# Patient Record
Sex: FEMALE | Race: WHITE | NOT HISPANIC OR LATINO | Employment: OTHER | URBAN - METROPOLITAN AREA
[De-identification: names, ages, dates, MRNs, and addresses within clinical notes are randomized per-mention and may not be internally consistent; named-entity substitution may affect disease eponyms.]

---

## 2017-01-03 ENCOUNTER — ALLSCRIPTS OFFICE VISIT (OUTPATIENT)
Dept: OTHER | Facility: OTHER | Age: 67
End: 2017-01-03

## 2017-02-01 ENCOUNTER — ALLSCRIPTS OFFICE VISIT (OUTPATIENT)
Dept: OTHER | Facility: OTHER | Age: 67
End: 2017-02-01

## 2017-02-01 DIAGNOSIS — E78.00 PURE HYPERCHOLESTEROLEMIA: ICD-10-CM

## 2017-02-01 DIAGNOSIS — D69.6 THROMBOCYTOPENIA (HCC): ICD-10-CM

## 2017-02-01 DIAGNOSIS — I10 ESSENTIAL (PRIMARY) HYPERTENSION: ICD-10-CM

## 2017-03-17 DIAGNOSIS — Z12.31 ENCOUNTER FOR SCREENING MAMMOGRAM FOR MALIGNANT NEOPLASM OF BREAST: ICD-10-CM

## 2017-03-23 ENCOUNTER — HOSPITAL ENCOUNTER (OUTPATIENT)
Dept: RADIOLOGY | Age: 67
Discharge: HOME | End: 2017-03-23
Payer: MEDICARE

## 2017-03-23 DIAGNOSIS — Z12.31 ENCOUNTER FOR SCREENING MAMMOGRAM FOR MALIGNANT NEOPLASM OF BREAST: ICD-10-CM

## 2017-03-23 PROCEDURE — G0202 SCR MAMMO BI INCL CAD: HCPCS

## 2017-05-31 ENCOUNTER — TRANSCRIBE ORDERS (OUTPATIENT)
Dept: LAB | Age: 67
End: 2017-05-31

## 2017-05-31 ENCOUNTER — APPOINTMENT (OUTPATIENT)
Dept: LAB | Age: 67
End: 2017-05-31
Payer: MEDICARE

## 2017-05-31 DIAGNOSIS — I10 ESSENTIAL (PRIMARY) HYPERTENSION: ICD-10-CM

## 2017-05-31 DIAGNOSIS — E78.00 PURE HYPERCHOLESTEROLEMIA: ICD-10-CM

## 2017-05-31 DIAGNOSIS — D69.6 THROMBOCYTOPENIA (HCC): ICD-10-CM

## 2017-05-31 LAB
ALBUMIN SERPL BCP-MCNC: 3.1 G/DL (ref 3.5–5)
ALP SERPL-CCNC: 107 U/L (ref 46–116)
ALT SERPL W P-5'-P-CCNC: 20 U/L (ref 12–78)
ANION GAP SERPL CALCULATED.3IONS-SCNC: 6 MMOL/L (ref 4–13)
AST SERPL W P-5'-P-CCNC: 24 U/L (ref 5–45)
BACTERIA UR QL AUTO: NORMAL /HPF
BASOPHILS # BLD AUTO: 0.03 THOUSANDS/ΜL (ref 0–0.1)
BASOPHILS NFR BLD AUTO: 1 % (ref 0–1)
BILIRUB SERPL-MCNC: 0.72 MG/DL (ref 0.2–1)
BILIRUB UR QL STRIP: NEGATIVE
BUN SERPL-MCNC: 6 MG/DL (ref 5–25)
CALCIUM SERPL-MCNC: 8.5 MG/DL (ref 8.3–10.1)
CHLORIDE SERPL-SCNC: 104 MMOL/L (ref 100–108)
CHOLEST SERPL-MCNC: 125 MG/DL (ref 50–200)
CLARITY UR: CLEAR
CO2 SERPL-SCNC: 30 MMOL/L (ref 21–32)
COLOR UR: YELLOW
CREAT SERPL-MCNC: 0.6 MG/DL (ref 0.6–1.3)
EOSINOPHIL # BLD AUTO: 0.07 THOUSAND/ΜL (ref 0–0.61)
EOSINOPHIL NFR BLD AUTO: 1 % (ref 0–6)
ERYTHROCYTE [DISTWIDTH] IN BLOOD BY AUTOMATED COUNT: 13.4 % (ref 11.6–15.1)
GFR SERPL CREATININE-BSD FRML MDRD: >60 ML/MIN/1.73SQ M
GLUCOSE P FAST SERPL-MCNC: 108 MG/DL (ref 65–99)
GLUCOSE UR STRIP-MCNC: NEGATIVE MG/DL
HCT VFR BLD AUTO: 42.8 % (ref 34.8–46.1)
HDLC SERPL-MCNC: 54 MG/DL (ref 40–60)
HGB BLD-MCNC: 14.2 G/DL (ref 11.5–15.4)
HGB UR QL STRIP.AUTO: NEGATIVE
HYALINE CASTS #/AREA URNS LPF: NORMAL /LPF
KETONES UR STRIP-MCNC: NEGATIVE MG/DL
LDLC SERPL CALC-MCNC: 51 MG/DL (ref 0–100)
LEUKOCYTE ESTERASE UR QL STRIP: ABNORMAL
LYMPHOCYTES # BLD AUTO: 1.81 THOUSANDS/ΜL (ref 0.6–4.47)
LYMPHOCYTES NFR BLD AUTO: 31 % (ref 14–44)
MCH RBC QN AUTO: 29.2 PG (ref 26.8–34.3)
MCHC RBC AUTO-ENTMCNC: 33.2 G/DL (ref 31.4–37.4)
MCV RBC AUTO: 88 FL (ref 82–98)
MONOCYTES # BLD AUTO: 0.36 THOUSAND/ΜL (ref 0.17–1.22)
MONOCYTES NFR BLD AUTO: 6 % (ref 4–12)
NEUTROPHILS # BLD AUTO: 3.58 THOUSANDS/ΜL (ref 1.85–7.62)
NEUTS SEG NFR BLD AUTO: 61 % (ref 43–75)
NITRITE UR QL STRIP: NEGATIVE
NON-SQ EPI CELLS URNS QL MICRO: NORMAL /HPF
NRBC BLD AUTO-RTO: 0 /100 WBCS
PH UR STRIP.AUTO: 6.5 [PH] (ref 4.5–8)
PLATELET # BLD AUTO: 142 THOUSANDS/UL (ref 149–390)
PMV BLD AUTO: 11 FL (ref 8.9–12.7)
POTASSIUM SERPL-SCNC: 3.7 MMOL/L (ref 3.5–5.3)
PROT SERPL-MCNC: 7.1 G/DL (ref 6.4–8.2)
PROT UR STRIP-MCNC: NEGATIVE MG/DL
RBC # BLD AUTO: 4.87 MILLION/UL (ref 3.81–5.12)
RBC #/AREA URNS AUTO: NORMAL /HPF
SODIUM SERPL-SCNC: 140 MMOL/L (ref 136–145)
SP GR UR STRIP.AUTO: 1 (ref 1–1.03)
TRIGL SERPL-MCNC: 102 MG/DL
TSH SERPL DL<=0.05 MIU/L-ACNC: 2.87 UIU/ML (ref 0.36–3.74)
UROBILINOGEN UR QL STRIP.AUTO: 0.2 E.U./DL
WBC # BLD AUTO: 5.87 THOUSAND/UL (ref 4.31–10.16)
WBC #/AREA URNS AUTO: NORMAL /HPF

## 2017-05-31 PROCEDURE — 84443 ASSAY THYROID STIM HORMONE: CPT

## 2017-05-31 PROCEDURE — 80053 COMPREHEN METABOLIC PANEL: CPT

## 2017-05-31 PROCEDURE — 81001 URINALYSIS AUTO W/SCOPE: CPT

## 2017-05-31 PROCEDURE — 36415 COLL VENOUS BLD VENIPUNCTURE: CPT

## 2017-05-31 PROCEDURE — 80061 LIPID PANEL: CPT

## 2017-05-31 PROCEDURE — 85025 COMPLETE CBC W/AUTO DIFF WBC: CPT

## 2017-06-06 ENCOUNTER — ALLSCRIPTS OFFICE VISIT (OUTPATIENT)
Dept: OTHER | Facility: OTHER | Age: 67
End: 2017-06-06

## 2017-06-06 DIAGNOSIS — R73.01 IMPAIRED FASTING GLUCOSE: ICD-10-CM

## 2017-09-28 ENCOUNTER — GENERIC CONVERSION - ENCOUNTER (OUTPATIENT)
Dept: OTHER | Facility: OTHER | Age: 67
End: 2017-09-28

## 2017-11-01 ENCOUNTER — APPOINTMENT (OUTPATIENT)
Dept: LAB | Age: 67
End: 2017-11-01
Payer: MEDICARE

## 2017-11-01 ENCOUNTER — TRANSCRIBE ORDERS (OUTPATIENT)
Dept: LAB | Age: 67
End: 2017-11-01

## 2017-11-01 DIAGNOSIS — R73.01 IMPAIRED FASTING GLUCOSE: ICD-10-CM

## 2017-11-01 LAB
EST. AVERAGE GLUCOSE BLD GHB EST-MCNC: 117 MG/DL
GLUCOSE P FAST SERPL-MCNC: 99 MG/DL (ref 65–99)
HBA1C MFR BLD: 5.7 % (ref 4.2–6.3)

## 2017-11-01 PROCEDURE — 82947 ASSAY GLUCOSE BLOOD QUANT: CPT

## 2017-11-01 PROCEDURE — 36415 COLL VENOUS BLD VENIPUNCTURE: CPT

## 2017-11-01 PROCEDURE — 83036 HEMOGLOBIN GLYCOSYLATED A1C: CPT

## 2017-11-10 ENCOUNTER — ALLSCRIPTS OFFICE VISIT (OUTPATIENT)
Dept: OTHER | Facility: OTHER | Age: 67
End: 2017-11-10

## 2017-11-10 DIAGNOSIS — D69.6 THROMBOCYTOPENIA (HCC): ICD-10-CM

## 2017-11-10 DIAGNOSIS — E83.51 HYPOCALCEMIA: ICD-10-CM

## 2017-11-10 DIAGNOSIS — I10 ESSENTIAL (PRIMARY) HYPERTENSION: ICD-10-CM

## 2017-11-11 NOTE — PROGRESS NOTES
Assessment    1  Benign essential hypertension (401 1) (I10)   2  BMI 37 0-37 9, adult (V85 37) (Z68 37)   3  Depression (311) (F32 9)   4  Hypercholesterolemia (272 0) (E78 00)   5  Thrombocytopenia (287 5) (D69 6)    Plan  Benign essential hypertension    · Nadolol 40 MG Oral Tablet (Corgard); TAKE 1 TABLET ONCE DAILY   · (1) LIPID PANEL, FASTING; Status:Active; Requested for:10Nov2017;   Depression    · Escitalopram Oxalate 10 MG Oral Tablet (Lexapro); TAKE 1 TABLET DAILY  Hypercholesterolemia    · Ezetimibe 10 MG Oral Tablet (Zetia); TAKE 1 TABLET DAILY   · Simvastatin 40 MG Oral Tablet; TAKE 1 TABLET DAILY  Hypocalcemia    · (1) BASIC METABOLIC PROFILE; Status:Active; Requested for:10Nov2017; Insomnia    · Zolpidem Tartrate 10 MG Oral Tablet (Ambien); TAKE 1 TABLET AT BEDTIME AS NEEDEDFOR SLEEP  PMH: Anxiety    · FLUoxetine HCl - 20 MG Oral Capsule (PROzac); TAKE 1 CAPSULE DAILY   · LORazepam 1 MG Oral Tablet (Ativan); take 0 5 tablet daily  PMH: Colon cancer screening    · COLONOSCOPY; Status:Active; Requested for:10Nov2017;   PMH: Depression screening    · *VB-Depression Screening ; every 1 year; Last 95DLU2106; Next 21GKA6308; Status:Active  PMH: Generalized osteoarthritis of multiple sites    · TraMADol HCl - 50 MG Oral Tablet; TAKE 1 TABLET EVERY 4 TO 6 HOURS AS NEEDED  Thrombocytopenia    · (1) CBC/PLT/DIFF; Status:Active; Requested for:10Nov2017;     Discussion/Summary  Discussion Summary:   Patient is doing well all stable follow-up in about 4 months discussed the blood workup results for impair fasting blood sugar fasting blood sugar is good hemoglobin A1c is 5 7  Counseling Documentation With Imm: The patient was counseled regarding diagnostic results,-- prognosis,-- impressions  Chief Complaint  Chief Complaint Free Text Note Form: pt  presents for follow up for HTN  Review Labs 11/1/17  pt  would like Med refills  pt  has colonoscopy order at home        History of Present Illness  Hypertension (Follow-Up): The patient presents for follow-up of essential hypertension-- and-- Hypertension is reasonably controlled last time was high and we increased the dose of the medication I think it is helping will continue with the same management no changes  The patient states she has been doing poorly with her blood pressure control since the last visit  Symptoms: denies impaired vision,-- denies dyspnea,-- denies chest pain-- and-- denies lower extremity edema  Associated symptoms include no headache,-- no focal neurologic deficits-- and-- no memory loss  Medications: the patient is adherent with her medication regimen  -- She denies medication side effects  Additional History: recently had an eye exam sept2015  Hyperlipidemia (Follow-Up): The patient states her hyperlipidemia has been under good control since the last visit  She has no comorbid illnesses  She has no significant interval events  Symptoms: denies chest pain,-- denies muscle pain-- and-- denies muscle weakness  Associated symptoms include no focal neurologic deficits-- and-- no memory loss  Medications: the patient is adherent with her medication regimen  -- She denies medication side effects  Depression (Follow-Up): The patient states her depression has Depression and anxiety is better, but insomnia remain a problem for her, but improved since the last visit  She has no comorbid illnesses  She has had no significant interval events  Interval Symptoms: The patient is currently asymptomatic  stable depression,-- denies depressed mood-- and-- denies anxiety  Medications: The patient is not currently on any medications for her depression  Insomnia (Follow-Up): The patient is being seen for follow-up of insomnia and He again tried to wean her off or decrease mg of the Ambien but she was not able to get a sleep and she went back on her usual dose  The patient reports doing well   Comorbid Illnesses: anxiety-- and-- depression  The patient is currently asymptomatic  Associated symptoms: depression  Review of Systems  Complete-Female:  Constitutional: no fever,-- not feeling poorly,-- no recent weight gain,-- no chills,-- not feeling tired-- and-- no recent weight loss  Eyes: no eyesight problems  ENT: no earache,-- no sore throat-- and-- no hearing loss  Cardiovascular: no chest pain-- and-- no palpitations  Respiratory: no shortness of breath-- and-- no cough  Gastrointestinal: no abdominal pain,-- no nausea,-- no vomiting,-- no constipation-- and-- no diarrhea  Genitourinary: no dysuria-- and-- no incontinence  Musculoskeletal: arthralgias-- and-- Knee pain take tramadol for knee pain, but-- no myalgias  Integumentary: no rashes-- and-- no skin lesions  Neurological: tingling, but-- no headache,-- no numbness,-- no dizziness-- and-- no limb weakness  Psychiatric: as noted in HPI  Active Problems  1  Benign essential hypertension (401 1) (I10)   2  BMI 37 0-37 9, adult (V85 37) (Z68 37)   3  Depression (311) (F32 9)   4  History of candidal vulvovaginitis (V13 29) (Z86 19)   5  Hypercholesterolemia (272 0) (E78 00)   6  Hypocalcemia (275 41) (E83 51)   7  Impaired fasting glucose (790 21) (R73 01)   8  Insomnia (780 52) (G47 00)   9  Lumbar radiculopathy, chronic (724 4) (M54 16)   10  Thrombocytopenia (287 5) (D69 6)   11  Vaginal yeast infection (112 1) (B37 3)    Past Medical History  1  History of A Fall Due To Slipping, Tripping, Or Stumbling (E885 9)   2  History of Annual physical exam (V70 0) (Z00 00)   3  History of Anxiety (300 00) (F41 9)   4  Denied: History of Carrier Of STD   5  History of Dizziness (780 4) (R42)   6  History of Encounter for screening for osteoporosis (V82 81) (Z13 820)   7  History of Encounter for screening mammogram for malignant neoplasm of breast (V76 12) (Z12 31)   8  History of Endometriosis (617 9) (N80 9)   9  History of Foot Pain (Soft Tissue) (729 5)   10   History of Generalized osteoarthritis of multiple sites (715 09) (M15 9)   11  History of hyperlipidemia (V12 29) (Z86 39)   12  History of hypertension (V12 59) (Z86 79)   13  History of impacted cerumen (V12 49) (Z86 69)   14  History of Mental status change (780 97) (R41 82)   15  History of Pelvic Adhesions (568 0)   16  History of Screening for colon cancer (V76 51) (Z12 11)   17  History of Skin Cancer (V10 83)   18  History of Squamous Cell Carcinoma (199 1)   19  History of Stiff neck (723 5) (M43 6)   20  History of Visit for screening mammogram (V76 12) (Z12 31)    Surgical History  1  History of Appendectomy   2  History of Bx Breast Percutan Needle Core Use Imag Guide (Stereotactic)   3  History of Cholecystectomy   4  History of Incisional Breast Biopsy   5  History of Pilonidal Cyst Resection   6  History of Total Abdominal Hysterectomy With Removal Of Both Ovaries    Family History  Mother    1  Family history of Breast Cancer (V16 3)  Father    2  Family history of lung cancer (V16 1) (Z80 1)  Paternal Aunt    3  Family history of Breast Cancer (V16 3)  Family History    4  Family history of Family Health Status 2  Children Living    Social History     · Alcohol Use (History)   · Daily Coffee Consumption (2  Cups/Day)   · Exercise Habits   · Marital History - Currently    · Never A Smoker   · Never Used Drugs   · Occupation:   · Rarely consumes alcohol (V49 89) (Z78 9)    Current Meds   1  Escitalopram Oxalate 10 MG Oral Tablet; TAKE 1 TABLET DAILY; Therapy: 36Rem4726 to (Evaluate:12Skn5883)  Requested for: 49LWY0910; Last Rx:06Jun2017 Ordered   2  Ezetimibe 10 MG Oral Tablet; TAKE 1 TABLET DAILY; Last Rx:06Jun2017 Ordered   3  FLUoxetine HCl - 20 MG Oral Capsule; TAKE 1 CAPSULE DAILY; Last Rx:06Jun2017 Ordered   4  LORazepam 1 MG Oral Tablet; take 0 5 tablet daily; Last Rx:31Oct2017 Ordered   5  Nadolol 40 MG Oral Tablet; TAKE 1 TABLET ONCE DAILY; Last Rx:06Jun2017 Ordered   6   Oscal 500/200 D-3 500-200 MG-UNIT Oral Tablet; Take 2 daily; Therapy: 07OHT2471 to (Evaluate:03Nov2016); Last Rx:04Oct2016 Ordered   7  Simvastatin 40 MG Oral Tablet; TAKE 1 TABLET DAILY; Last Rx:06Jun2017 Ordered   8  TraMADol HCl - 50 MG Oral Tablet; TAKE 1 TABLET EVERY 4 TO 6 HOURS AS NEEDED; Therapy: 77UQM0964 to (Evaluate:69Cjj1826)  Requested for: 96GGG1229; Last Rx:06Jun2017 Ordered   9  Zolpidem Tartrate 10 MG Oral Tablet; TAKE 1 TABLET AT BEDTIME AS NEEDED FOR SLEEP; Last Rx:06Jun2017 Ordered  Medication List Reviewed: The medication list was reviewed and updated today  Allergies  1  Aspirin Buffered TABS   2  Codeine Derivatives   3  Pravachol TABS  4  No Known Environmental Allergies   5  No Known Food Allergies    Vitals  Vital Signs    Recorded: 86YLK0640 09:41AM   Temperature 97 6 F, Tympanic   Heart Rate 62   Systolic 497, LUE, Sitting   Diastolic 78, LUE, Sitting   Height 5 ft 1 in   Weight 200 lb    BMI Calculated 37 79   BSA Calculated 1 89   O2 Saturation 97, RA       Physical Exam   Constitutional  General appearance: No acute distress, well appearing and well nourished  Ears, Nose, Mouth, and Throat  External inspection of ears and nose: Normal    Oropharynx: Normal with no erythema, edema, exudate or lesions  Pulmonary  Respiratory effort: No increased work of breathing or signs of respiratory distress  Auscultation of lungs: Clear to auscultation  Cardiovascular  Auscultation of heart: Normal rate and rhythm, normal S1 and S2, without murmurs  Examination of extremities for edema and/or varicosities: Normal    Abdomen  Abdomen: Non-tender, no masses  Lymphatic  Palpation of lymph nodes in neck: No lymphadenopathy  Musculoskeletal  Inspection/palpation of joints, bones, and muscles: Abnormal  -- (straight leg test positive for pain on the right leg  THe pain is localized to the buttocks and to the back of the thigh   No pain on palpation  )        Results/Data  PHQ-9 Adult Depression Screening 69YYK1402 09:34AM User, s     Test Name Result Flag Reference   PHQ-9 Adult Depression Score 3       Over the last two weeks, how often have you been bothered by any of the following problems? Little interest or pleasure in doing things: Not at all - 0 Feeling down, depressed, or hopeless: Not at all - 0 Trouble falling or staying asleep, or sleeping too much: More than half the days - 2 Feeling tired or having little energy: Not at all - 0 Poor appetite or over eating: Several days - 1 Feeling bad about yourself - or that you are a failure or have let yourself or your family down: Not at all - 0 Trouble concentrating on things, such as reading the newspaper or watching television: Not at all - 0 Moving or speaking so slowly that other people could have noticed  Or the opposite -  being so fidgety or restless that you have been moving around a lot more than usual: Not at all - 0 Thoughts that you would be better off dead, or of hurting yourself in some way: Not at all - 0   PHQ-9 Adult Depression Screening Negative     PHQ-9 Difficulty Level Not difficult at all     PHQ-9 Severity Minimal Depression           Health Management  Depression screening   *VB-Depression Screening; every 1 year; Last 08NWW3412; Next Due: 98BLS0483; Active  History of Encounter for screening for other nervous system disorder   *VB - Fall Risk Assessment  (Dx Z13 89 Screen for Neurologic Disorder); every 1 year; [id number]; Next Due: 80CLU2004; Active  History of Encounter for screening mammogram for malignant neoplasm of breast   DIGTL SCRN MAMMO W/CAD; every 1 year; Last 04OKU3932; Next Due: 82VHN6449; Overdue  History of Screening for other and unspecified genitourinary condition   *VB - Urinary Incontinence Screen (Dx Z13 89 Screen for UI); every 1 year; Last 31EMJ1058; NextDue: 35IDC4748; Active  History of Visit for screening mammogram   Digital Bilateral Screening Mammogram With CAD; every 1 year;  Last 24WDA0838; Next Due:17Mar2017;  Overdue    Future Appointments    Date/Time Provider Specialty Site   01/16/2018 02:40 PM Samantha Fraser MD Obstetrics/Gynecology [facility]       Signatures   Electronically signed by : Rosio Olvera MD; Nov 10 2017 10:08AM EST                       (Author)

## 2018-01-12 NOTE — PROGRESS NOTES
Assessment    1  Benign essential hypertension (401 1) (I10)   2  Hypercholesterolemia (272 0) (E78 0)   3  Insomnia (780 52) (G47 00)   4  Lumbar radiculopathy, chronic (724 4) (M54 16)   5  Stiff neck (723 5) (M43 6)    Plan  Depression    · Escitalopram Oxalate 10 MG Oral Tablet (Lexapro); TAKE 1 TABLET DAILY  Insomnia    · Zolpidem Tartrate 10 MG Oral Tablet (Ambien); TAKE 1 TABLET AT BEDTIME AS NEEDED  FOR SLEEP  Lumbar radiculopathy, chronic, Stiff neck    · *1 - [facility] Physical Therapy  Consult  Status: Hold For - Scheduling  Requested for:  26Jan2016  Care Summary provided  : Yes  PMH: Generalized osteoarthritis of multiple sites    · TraMADol HCl - 50 MG Oral Tablet; TAKE 1 TABLET EVERY 4 TO 6 HOURS AS NEEDED    Chief Complaint  Chief Complaint Free Text Note Form: PT  presents for 6 week follow up for HTN, hyperlipidemia  Please discuss Ambien      History of Present Illness  HPI: Patient states that she is feeling much better on the new medication, escitalopram  She reports no side effects  She is feeling much better after taking the half dose of Prozac and states that she no longer has GI discomfort  The patient states that the Ambien is helping her fall asleep and stay asleep  The patient has complaints of a stiff neck and pain when she nods her head up and down  She also has pain radiates down her right leg with certain movements  At times she has numbness and tingling in her right leg that lasts for a couple minutes  The pain started last July and has not gone away  She has not taken any medications for the pain  Hyperlipidemia (Follow-Up): Symptoms: denies chest pain, denies muscle pain and denies muscle weakness  Associated symptoms include no focal neurologic deficits and no memory loss  Medications: the patient is adherent with her medication regimen  She denies medication side effects  Hypertension (Follow-Up):    Symptoms: denies impaired vision, denies dyspnea, denies chest pain and denies lower extremity edema  Associated symptoms include no headache, no focal neurologic deficits and no memory loss  Medications: the patient is adherent with her medication regimen  She denies medication side effects  Additional History: recently had an eye exam sept2015  Insomnia (Follow-Up): The patient is being seen for follow-up of insomnia  The patient reports doing well and Patient doing well with 10mg and sleeping well started after the death of her young son , no side effects of medication and can not sleep without it  Comorbid Illnesses: anxiety and depression  Interval symptoms:  denies difficulty falling asleep, denies difficulty staying asleep, denies unrefreshing sleep, denies daytime sleepiness and denies fatigue  Back Pain (Follow-Up): The patient is being seen for follow-up of back pain  Interval symptoms:  worsened back stiffness and worsened lower extremity pain    The patient presents with complaints of gradual onset of intermittent episodes of worsened buttock pain  Symptoms are worsening  Review of Systems  Complete-Female:   Constitutional: no fever, not feeling poorly, no recent weight gain, no chills, not feeling tired and no recent weight loss  Eyes: no eyesight problems  ENT: no earache, no sore throat and no hearing loss  Cardiovascular: no chest pain and no palpitations  Respiratory: no shortness of breath and no cough  Gastrointestinal: no abdominal pain, no nausea, no vomiting, no constipation and no diarrhea  Genitourinary: no dysuria and no incontinence  Musculoskeletal: no myalgias  Integumentary: no rashes and no skin lesions  Neurological: tingling, but no headache, no numbness, no dizziness and no limb weakness  Psychiatric: as noted in HPI  Active Problems    1  Benign essential hypertension (401 1) (I10)   2  BMI 37 0-37 9, adult (V85 37) (Z68 37)   3  Breast cancer screening, high risk patient (V76 11) (Z12 31)   4  Depression (311) (F32 9)   5  Encounter for routine gynecological examination (V72 31) (Z01 419)   6  Encounter for screening for other nervous system disorder (V80 09) (Z13 858)   7  Hypercholesterolemia (272 0) (E78 0)   8  Insomnia (780 52) (G47 00)    Past Medical History    1  History of A Fall Due To Slipping, Tripping, Or Stumbling (E885 9)   2  History of Annual physical exam (V70 0) (Z00 00)   3  History of Anxiety (300 00) (F41 9)   4  Denied: History of Carrier Of STD   5  History of Colon cancer screening (V76 51) (Z12 11)   6  History of Dizziness (780 4) (R42)   7  History of Encounter for screening for osteoporosis (V82 81) (Z13 820)   8  History of Encounter for screening mammogram for malignant neoplasm of breast (V76 12) (Z12 31)   9  History of Endometriosis (617 9) (N80 9)   10  History of Foot Pain (Soft Tissue) (729 5)   11  History of Generalized osteoarthritis of multiple sites (715 09) (M15 9)   12  History of hyperlipidemia (V12 29) (Z86 39)   13  History of hypertension (V12 59) (Z86 79)   14  History of impacted cerumen (V12 49) (Z86 69)   15  History of Mental status change (780 97) (R41 82)   16  History of Need for prophylactic vaccination and inoculation against influenza (V04 81) (Z23)   17  History of Pelvic Adhesions (568 0)   18  History of Screening for colon cancer (V76 51) (Z12 11)   19  History of Skin Cancer (V10 83)   20  History of Squamous Cell Carcinoma (199 1)   21  History of Visit for screening mammogram (V76 12) (Z12 31)    Surgical History    1  History of Appendectomy   2  History of Bx Breast Percutan Needle Core Use Imag Guide (Stereotactic)   3  History of Cholecystectomy   4  History of Incisional Breast Biopsy   5  History of Pilonidal Cyst Resection   6  History of Total Abdominal Hysterectomy With Removal Of Both Ovaries    Family History    1  Family history of Breast Cancer (V16 3)    2  Family history of Breast Cancer (V16 3)    3   Family history of Family Health Status 2  Children Living    Social History    · Alcohol Use (History)   · Daily Coffee Consumption (2  Cups/Day)   · Exercise Habits   · Marital History - Currently    · Never A Smoker   · Never Used Drugs   · Occupation:    Current Meds   1  Escitalopram Oxalate 10 MG Oral Tablet; TAKE 1 TABLET DAILY; Therapy: 13Yaz7074 to (Campbell Richter)  Requested for: 47Mbl5949; Last Rx:14Nuf1517   Ordered   2  FLUoxetine HCl - 20 MG Oral Capsule; TAKE 1 CAPSULE DAILY; Last Rx:24Aug2015 Ordered   3  LORazepam 0 5 MG Oral Tablet; TAKE 1 TABLET DAILY AS NEEDED; Last Rx:24Aug2015 Ordered   4  Nadolol 20 MG Oral Tablet; TAKE 1 TABLET DAILY; Last Rx:24Aug2015 Ordered   5  Simvastatin 40 MG Oral Tablet; TAKE 1 TABLET DAILY; Last Rx:24Aug2015 Ordered   6  TraMADol HCl - 50 MG Oral Tablet; TAKE 1 TABLET EVERY 4 TO 6 HOURS AS NEEDED; Therapy: 22ZFR0063 to (Evaluate:87Lpt1086)  Requested for: 24Aug2015; Last Rx:24Aug2015   Ordered   7  Zetia 10 MG Oral Tablet; TAKE 1 TABLET DAILY; Last Rx:24Aug2015 Ordered   8  Zolpidem Tartrate 10 MG Oral Tablet; TAKE 1 TABLET AT BEDTIME AS NEEDED FOR SLEEP; Last   Rx:24Aug2015 Ordered    Allergies    1  Aspirin Buffered TABS   2  Codeine Derivatives   3  Codeine Sulfate TABS   4  Pravachol TABS    Vitals  Vital Signs [Data Includes: Current Encounter]    Recorded: 91GEH7194 05:20PM   Temperature 97 9 F, Tympanic   Heart Rate 71   Systolic 365, LUE, Sitting   Diastolic 82, LUE, Sitting   Height 5 ft 1 in   Weight 201 lb 6 oz   BMI Calculated 38 05   BSA Calculated 1 89   O2 Saturation 95     Physical Exam    Constitutional   General appearance: No acute distress, well appearing and well nourished  Ears, Nose, Mouth, and Throat   External inspection of ears and nose: Normal     Oropharynx: Normal with no erythema, edema, exudate or lesions  Pulmonary   Respiratory effort: No increased work of breathing or signs of respiratory distress      Auscultation of lungs: Clear to auscultation  Cardiovascular   Auscultation of heart: Normal rate and rhythm, normal S1 and S2, without murmurs  Examination of extremities for edema and/or varicosities: Normal     Abdomen   Abdomen: Non-tender, no masses  Lymphatic   Palpation of lymph nodes in neck: No lymphadenopathy  Musculoskeletal   Inspection/palpation of joints, bones, and muscles: Abnormal   (straight leg test positive for pain on the right leg  THe pain is localized to the buttocks and to the back of the thigh  No pain on palpation  )        Health Management  Encounter for screening for other nervous system disorder   *VB - Fall Risk Assessment  (Dx V80 09 Screen for Neurologic Disorder); every 1 year; Last  37YSV3407; Next Due: 55KYQ6727; Active  History of Encounter for screening mammogram for malignant neoplasm of breast   DIGTL SCRN MAMMO W/CAD; every 1 year; Last 56GNO6902; Next Due: 30WKD0286; Near Due  History of Visit for screening mammogram   Digital Bilateral Screening Mammogram With CAD; every 1 year; Last 85RNP5455; Next Due:  63FZN3438;  Overdue    Future Appointments    Date/Time Provider Specialty Site   01/03/2017 03:00 PM Ryan Naylor MD Obstetrics/Gynecology [facility] & [address], [address]     Signatures   Electronically signed by : Kamala Betancourt MD; Jan 26 2016  6:03PM EST                       (Author)

## 2018-01-13 VITALS
BODY MASS INDEX: 37.76 KG/M2 | DIASTOLIC BLOOD PRESSURE: 92 MMHG | WEIGHT: 200 LBS | SYSTOLIC BLOOD PRESSURE: 150 MMHG | HEIGHT: 61 IN

## 2018-01-13 VITALS
SYSTOLIC BLOOD PRESSURE: 138 MMHG | OXYGEN SATURATION: 97 % | BODY MASS INDEX: 37.76 KG/M2 | WEIGHT: 200 LBS | HEIGHT: 61 IN | DIASTOLIC BLOOD PRESSURE: 78 MMHG | TEMPERATURE: 97.6 F | HEART RATE: 62 BPM

## 2018-01-14 VITALS
SYSTOLIC BLOOD PRESSURE: 142 MMHG | OXYGEN SATURATION: 96 % | TEMPERATURE: 97.5 F | DIASTOLIC BLOOD PRESSURE: 74 MMHG | HEART RATE: 62 BPM | BODY MASS INDEX: 37.59 KG/M2 | HEIGHT: 61 IN | WEIGHT: 199.13 LBS

## 2018-01-15 VITALS
BODY MASS INDEX: 37.57 KG/M2 | HEIGHT: 61 IN | OXYGEN SATURATION: 96 % | WEIGHT: 199 LBS | TEMPERATURE: 97 F | SYSTOLIC BLOOD PRESSURE: 148 MMHG | HEART RATE: 84 BPM | DIASTOLIC BLOOD PRESSURE: 86 MMHG

## 2018-01-16 ENCOUNTER — GENERIC CONVERSION - ENCOUNTER (OUTPATIENT)
Dept: OTHER | Facility: OTHER | Age: 68
End: 2018-01-16

## 2018-01-18 NOTE — MISCELLANEOUS
Message   Recorded as Task   Date: 10/31/2016 09:08 AM, Created By: Coy Curling   Task Name: Follow Up   Assigned To: Anastasiia Aviles   Regarding Patient: Lawrence April, Status: In Progress   CommentEllard Dinning - 31 Oct 2016 9:08 AM     TASK CREATED  Caller: Self; [phone] (Home); [phone] (Work)  pt has a bad yeast inf call her at [address] Oct 2016 9:54 AM     TASK IN [address] Alice Garcia,[address] - 31 Oct 2016 9:58 AM     TASK EDITED   Kandis Avila - 31 Oct 2016 10:14 AM     TASK EDITED                 has itching, little discharge, denies odor  states it started on thursday-diflucan sent to [facility] awaiting sign off from   Active Problems    1  Benign essential hypertension (401 1) (I10)   2  BMI 37 0-37 9, adult (V85 37) (Z68 37)   3  Breast cancer screening, high risk patient (V76 11) (Z12 39)   4  Colon cancer screening (V76 51) (Z12 11)   5  Depression (311) (F32 9)   6  Encounter for routine gynecological examination (V72 31) (Z01 419)   7  Encounter for screening for other nervous system disorder (V80 09) (Z13 858)   8  Hypercholesterolemia (272 0) (E78 00)   9  Hypocalcemia (275 41) (E83 51)   10  Insomnia (780 52) (G47 00)   11  Lumbar radiculopathy, chronic (724 4) (M54 16)   12  Need for prophylactic vaccination and inoculation against influenza (V04 81) (Z23)   13  Screening for genitourinary condition (V81 6) (Z13 89)   14  Screening for neurological condition (V80 09) (Z13 89)   15  Stiff neck (723 5) (M43 6)   16  Thrombocytopenia (287 5) (D69 6)   17  Vaginal yeast infection (112 1) (B37 3)    Current Meds   1  Escitalopram Oxalate 10 MG Oral Tablet (Lexapro); TAKE 1 TABLET DAILY; Therapy: [id number] to (Evaluate:02Apr2017)  Requested for: [id number]; Last   Rx:04Oct2016 Ordered   2  FLUoxetine HCl - 20 MG Oral Capsule (PROzac); TAKE 1 CAPSULE DAILY; Last   Rx:04Oct2016 Ordered   3   LORazepam 0 5 MG Oral Tablet; TAKE 1 TABLET DAILY AS NEEDED; Last   Rx:04Oct2016 Ordered   4  Nadolol 20 MG Oral Tablet; TAKE 1 TABLET DAILY; Last Rx:04Oct2016 Ordered   5  Oscal 500/200 D-3 500-200 MG-UNIT Oral Tablet; Take 2 daily; Therapy: [id number] to (Evaluate:03Nov2016); Last Rx:[id number] Ordered   6  Simvastatin 40 MG Oral Tablet; TAKE 1 TABLET DAILY; Last Rx:04Oct2016 Ordered   7  TraMADol HCl - 50 MG Oral Tablet; TAKE 1 TABLET EVERY 4 TO 6 HOURS AS   NEEDED; Therapy: [id number] to (Evaluate:02Apr2017)  Requested for: 04Oct2016; Last   Rx:[id number] Ordered   8  Zetia 10 MG Oral Tablet; TAKE 1 TABLET DAILY; Last Rx:04Oct2016 Ordered   9  Zolpidem Tartrate 10 MG Oral Tablet (Ambien); TAKE 1 TABLET AT BEDTIME AS   NEEDED FOR SLEEP; Last Rx:04Oct2016 Ordered    Allergies    1  Aspirin Buffered TABS   2  Codeine Derivatives   3   Pravachol TABS    Plan  Vaginal yeast infection    · Fluconazole 150 MG Oral Tablet (Diflucan); TAKE 1 TABLET NOW AND AGAIN IN  3 DAYS    Signatures   Electronically signed by : Anupama Cedeno LPN; Oct 31 5839 [id number] EST                       (Author)

## 2018-01-24 VITALS
BODY MASS INDEX: 37.57 KG/M2 | HEIGHT: 61 IN | DIASTOLIC BLOOD PRESSURE: 70 MMHG | SYSTOLIC BLOOD PRESSURE: 134 MMHG | WEIGHT: 199 LBS

## 2018-03-23 DIAGNOSIS — Z12.31 ENCOUNTER FOR SCREENING MAMMOGRAM FOR MALIGNANT NEOPLASM OF BREAST: ICD-10-CM

## 2018-03-29 ENCOUNTER — HOSPITAL ENCOUNTER (OUTPATIENT)
Dept: RADIOLOGY | Age: 68
Discharge: HOME | End: 2018-03-29
Payer: MEDICARE

## 2018-03-29 DIAGNOSIS — Z12.31 ENCOUNTER FOR SCREENING MAMMOGRAM FOR MALIGNANT NEOPLASM OF BREAST: ICD-10-CM

## 2018-03-29 PROCEDURE — 77067 SCR MAMMO BI INCL CAD: CPT

## 2018-04-04 ENCOUNTER — APPOINTMENT (OUTPATIENT)
Dept: LAB | Age: 68
End: 2018-04-04
Payer: MEDICARE

## 2018-04-04 DIAGNOSIS — D69.6 THROMBOCYTOPENIA (HCC): ICD-10-CM

## 2018-04-04 DIAGNOSIS — I10 ESSENTIAL (PRIMARY) HYPERTENSION: ICD-10-CM

## 2018-04-04 DIAGNOSIS — E83.51 HYPOCALCEMIA: ICD-10-CM

## 2018-04-04 LAB
ANION GAP SERPL CALCULATED.3IONS-SCNC: 7 MMOL/L (ref 4–13)
BASOPHILS # BLD AUTO: 0.02 THOUSANDS/ΜL (ref 0–0.1)
BASOPHILS NFR BLD AUTO: 0 % (ref 0–1)
BUN SERPL-MCNC: 5 MG/DL (ref 5–25)
CALCIUM SERPL-MCNC: 8.2 MG/DL (ref 8.3–10.1)
CHLORIDE SERPL-SCNC: 106 MMOL/L (ref 100–108)
CHOLEST SERPL-MCNC: 124 MG/DL (ref 50–200)
CO2 SERPL-SCNC: 28 MMOL/L (ref 21–32)
CREAT SERPL-MCNC: 0.66 MG/DL (ref 0.6–1.3)
EOSINOPHIL # BLD AUTO: 0.09 THOUSAND/ΜL (ref 0–0.61)
EOSINOPHIL NFR BLD AUTO: 2 % (ref 0–6)
ERYTHROCYTE [DISTWIDTH] IN BLOOD BY AUTOMATED COUNT: 13.5 % (ref 11.6–15.1)
GFR SERPL CREATININE-BSD FRML MDRD: 90 ML/MIN/1.73SQ M
GLUCOSE P FAST SERPL-MCNC: 115 MG/DL (ref 65–99)
HCT VFR BLD AUTO: 41 % (ref 34.8–46.1)
HDLC SERPL-MCNC: 58 MG/DL (ref 40–60)
HGB BLD-MCNC: 13.3 G/DL (ref 11.5–15.4)
LDLC SERPL CALC-MCNC: 46 MG/DL (ref 0–100)
LYMPHOCYTES # BLD AUTO: 1.81 THOUSANDS/ΜL (ref 0.6–4.47)
LYMPHOCYTES NFR BLD AUTO: 33 % (ref 14–44)
MCH RBC QN AUTO: 29.2 PG (ref 26.8–34.3)
MCHC RBC AUTO-ENTMCNC: 32.4 G/DL (ref 31.4–37.4)
MCV RBC AUTO: 90 FL (ref 82–98)
MONOCYTES # BLD AUTO: 0.35 THOUSAND/ΜL (ref 0.17–1.22)
MONOCYTES NFR BLD AUTO: 6 % (ref 4–12)
NEUTROPHILS # BLD AUTO: 3.18 THOUSANDS/ΜL (ref 1.85–7.62)
NEUTS SEG NFR BLD AUTO: 59 % (ref 43–75)
NRBC BLD AUTO-RTO: 0 /100 WBCS
PLATELET # BLD AUTO: 119 THOUSANDS/UL (ref 149–390)
PMV BLD AUTO: 10.8 FL (ref 8.9–12.7)
POTASSIUM SERPL-SCNC: 3.5 MMOL/L (ref 3.5–5.3)
RBC # BLD AUTO: 4.55 MILLION/UL (ref 3.81–5.12)
SODIUM SERPL-SCNC: 141 MMOL/L (ref 136–145)
TRIGL SERPL-MCNC: 101 MG/DL
WBC # BLD AUTO: 5.46 THOUSAND/UL (ref 4.31–10.16)

## 2018-04-04 PROCEDURE — 36415 COLL VENOUS BLD VENIPUNCTURE: CPT

## 2018-04-04 PROCEDURE — 80048 BASIC METABOLIC PNL TOTAL CA: CPT

## 2018-04-04 PROCEDURE — 85025 COMPLETE CBC W/AUTO DIFF WBC: CPT

## 2018-04-04 PROCEDURE — 80061 LIPID PANEL: CPT

## 2018-04-09 ENCOUNTER — OFFICE VISIT (OUTPATIENT)
Dept: INTERNAL MEDICINE CLINIC | Age: 68
End: 2018-04-09
Payer: MEDICARE

## 2018-04-09 VITALS
HEIGHT: 61 IN | OXYGEN SATURATION: 97 % | BODY MASS INDEX: 37.72 KG/M2 | HEART RATE: 58 BPM | DIASTOLIC BLOOD PRESSURE: 68 MMHG | TEMPERATURE: 98.3 F | SYSTOLIC BLOOD PRESSURE: 122 MMHG | WEIGHT: 199.8 LBS

## 2018-04-09 DIAGNOSIS — D69.6 THROMBOCYTOPENIA (HCC): ICD-10-CM

## 2018-04-09 DIAGNOSIS — I10 HYPERTENSION, UNSPECIFIED TYPE: ICD-10-CM

## 2018-04-09 DIAGNOSIS — E78.00 HYPERCHOLESTEREMIA: ICD-10-CM

## 2018-04-09 DIAGNOSIS — I10 BENIGN ESSENTIAL HYPERTENSION: ICD-10-CM

## 2018-04-09 DIAGNOSIS — R73.01 IMPAIRED FASTING GLUCOSE: ICD-10-CM

## 2018-04-09 DIAGNOSIS — E78.00 HYPERCHOLESTEROLEMIA: ICD-10-CM

## 2018-04-09 DIAGNOSIS — E83.51 HYPOCALCEMIA: ICD-10-CM

## 2018-04-09 DIAGNOSIS — F41.9 ANXIETY: Primary | ICD-10-CM

## 2018-04-09 DIAGNOSIS — M25.562 PAIN IN BOTH KNEES, UNSPECIFIED CHRONICITY: ICD-10-CM

## 2018-04-09 DIAGNOSIS — M25.561 PAIN IN BOTH KNEES, UNSPECIFIED CHRONICITY: ICD-10-CM

## 2018-04-09 DIAGNOSIS — G47.00 INSOMNIA, UNSPECIFIED TYPE: ICD-10-CM

## 2018-04-09 DIAGNOSIS — F32.A DEPRESSION, UNSPECIFIED DEPRESSION TYPE: ICD-10-CM

## 2018-04-09 DIAGNOSIS — M17.12 PRIMARY OSTEOARTHRITIS OF LEFT KNEE: ICD-10-CM

## 2018-04-09 DIAGNOSIS — Z12.11 SCREENING FOR COLON CANCER: ICD-10-CM

## 2018-04-09 PROBLEM — N95.2 VAGINAL ATROPHY: Status: ACTIVE | Noted: 2018-01-16

## 2018-04-09 PROCEDURE — 99214 OFFICE O/P EST MOD 30 MIN: CPT | Performed by: INTERNAL MEDICINE

## 2018-04-09 RX ORDER — FLUOXETINE HYDROCHLORIDE 20 MG/1
1 CAPSULE ORAL DAILY
COMMUNITY
End: 2018-04-09 | Stop reason: ALTCHOICE

## 2018-04-09 RX ORDER — TRAMADOL HYDROCHLORIDE 50 MG/1
50 TABLET ORAL 2 TIMES DAILY
Qty: 60 TABLET | Refills: 0 | Status: SHIPPED | OUTPATIENT
Start: 2018-04-09 | End: 2018-04-09 | Stop reason: SDUPTHER

## 2018-04-09 RX ORDER — EZETIMIBE 10 MG/1
10 TABLET ORAL DAILY
Qty: 90 TABLET | Refills: 1 | Status: SHIPPED | OUTPATIENT
Start: 2018-04-09 | End: 2018-10-10 | Stop reason: SDUPTHER

## 2018-04-09 RX ORDER — TRAMADOL HYDROCHLORIDE 50 MG/1
50 TABLET ORAL 2 TIMES DAILY PRN
Qty: 60 TABLET | Refills: 5 | Status: SHIPPED | OUTPATIENT
Start: 2018-04-09 | End: 2018-10-10 | Stop reason: SDUPTHER

## 2018-04-09 RX ORDER — ZOLPIDEM TARTRATE 10 MG/1
1 TABLET ORAL DAILY PRN
COMMUNITY
End: 2018-04-09 | Stop reason: SDUPTHER

## 2018-04-09 RX ORDER — NADOLOL 40 MG/1
1 TABLET ORAL DAILY
COMMUNITY
End: 2018-04-09 | Stop reason: SDUPTHER

## 2018-04-09 RX ORDER — SIMVASTATIN 40 MG
40 TABLET ORAL DAILY
Qty: 90 TABLET | Refills: 1 | Status: SHIPPED | OUTPATIENT
Start: 2018-04-09 | End: 2018-10-10 | Stop reason: SDUPTHER

## 2018-04-09 RX ORDER — FLUOXETINE HYDROCHLORIDE 20 MG/1
20 CAPSULE ORAL DAILY
Qty: 90 CAPSULE | Refills: 0 | Status: CANCELLED | OUTPATIENT
Start: 2018-04-09

## 2018-04-09 RX ORDER — LORAZEPAM 1 MG/1
0.5 TABLET ORAL DAILY
COMMUNITY
End: 2018-04-09 | Stop reason: SDUPTHER

## 2018-04-09 RX ORDER — ZOLPIDEM TARTRATE 10 MG/1
10 TABLET ORAL
Qty: 30 TABLET | Refills: 5 | Status: SHIPPED | OUTPATIENT
Start: 2018-04-09 | End: 2018-10-10 | Stop reason: SDUPTHER

## 2018-04-09 RX ORDER — ZOLPIDEM TARTRATE 10 MG/1
10 TABLET ORAL
Qty: 30 TABLET | Refills: 0 | Status: SHIPPED | OUTPATIENT
Start: 2018-04-09 | End: 2018-04-09 | Stop reason: SDUPTHER

## 2018-04-09 RX ORDER — LORAZEPAM 1 MG/1
0.5 TABLET ORAL DAILY PRN
Qty: 30 TABLET | Refills: 0 | Status: SHIPPED | OUTPATIENT
Start: 2018-04-09 | End: 2018-10-10 | Stop reason: SDUPTHER

## 2018-04-09 RX ORDER — EZETIMIBE 10 MG/1
1 TABLET ORAL DAILY
COMMUNITY
End: 2018-04-09 | Stop reason: SDUPTHER

## 2018-04-09 RX ORDER — TRAMADOL HYDROCHLORIDE 50 MG/1
1 TABLET ORAL
COMMUNITY
Start: 2014-02-18 | End: 2018-04-09 | Stop reason: SDUPTHER

## 2018-04-09 RX ORDER — ESCITALOPRAM OXALATE 10 MG/1
1 TABLET ORAL DAILY
COMMUNITY
Start: 2015-08-24 | End: 2018-04-09 | Stop reason: SDUPTHER

## 2018-04-09 RX ORDER — NADOLOL 40 MG/1
40 TABLET ORAL DAILY
Qty: 90 TABLET | Refills: 1 | Status: SHIPPED | OUTPATIENT
Start: 2018-04-09 | End: 2018-10-10 | Stop reason: SDUPTHER

## 2018-04-09 RX ORDER — ESCITALOPRAM OXALATE 10 MG/1
10 TABLET ORAL DAILY
Qty: 90 TABLET | Refills: 1 | Status: SHIPPED | OUTPATIENT
Start: 2018-04-09 | End: 2018-10-10 | Stop reason: SDUPTHER

## 2018-04-09 RX ORDER — SIMVASTATIN 40 MG
1 TABLET ORAL DAILY
COMMUNITY
End: 2018-04-09 | Stop reason: SDUPTHER

## 2018-04-09 NOTE — ASSESSMENT & PLAN NOTE
Discontinue fluoxetine, wean off with taking 20 mg every other day for 2-weeks and then stop  Continue with Lexapro 10 mg daily  Patient advised to contact office for worsening anxiety/ depression symptoms  Ativan 0 5 mg to be taken daily as needed refilled today

## 2018-04-09 NOTE — ASSESSMENT & PLAN NOTE
Stable  No need for a referral to orthopedics at this time  Encourage continued exercised  Tramadol refilled today

## 2018-04-09 NOTE — PROGRESS NOTES
Assessment/Plan:    Impaired fasting glucose    Will check a hemoglobin A1c prior to next appointment  Encourage lifestyle modifications  Benign essential hypertension    Well controlled  Continue with current regimen  Primary osteoarthritis of left knee    Stable  No need for a referral to orthopedics at this time  Encourage continued exercised  Tramadol refilled today  Depression  Discontinue fluoxetine, wean off with taking 20 mg every other day for 2-weeks and then stop  Continue with Lexapro 10 mg daily  Patient advised to contact office for worsening anxiety/ depression symptoms  Ativan 0 5 mg to be taken daily as needed refilled today  Hypercholesterolemia    Stable, well controlled  Continue with ezetimibe and simvastatin  Insomnia    Ambien refill today, 10 mg daily as needed for insomnia  Hypocalcemia  Recommend she supplement 1200 mg calcium and 1000 IU vitamin D  Daily  Thrombocytopenia (HCC)    Stable  Continue to monitor  Diagnoses and all orders for this visit:    Anxiety  -     escitalopram (LEXAPRO) 10 mg tablet; Take 1 tablet (10 mg total) by mouth daily  -     LORazepam (ATIVAN) 1 mg tablet; Take 0 5 tablets (0 5 mg total) by mouth daily as needed for anxiety    Screening for colon cancer  -     Ambulatory referral to Gastroenterology; Future    Hypercholesteremia  -     ezetimibe (ZETIA) 10 mg tablet; Take 1 tablet (10 mg total) by mouth daily  -     simvastatin (ZOCOR) 40 mg tablet; Take 1 tablet (40 mg total) by mouth daily    Hypertension, unspecified type  -     nadolol (CORGARD) 40 mg tablet; Take 1 tablet (40 mg total) by mouth daily    Pain in both knees, unspecified chronicity  -     traMADol (ULTRAM) 50 mg tablet; Take 1 tablet (50 mg total) by mouth 2 (two) times a day    Insomnia, unspecified type  -     zolpidem (AMBIEN) 10 mg tablet;  Take 1 tablet (10 mg total) by mouth daily at bedtime as needed (prn)    Impaired fasting glucose  - HEMOGLOBIN A1C W/ EAG ESTIMATION; Future    Benign essential hypertension    Primary osteoarthritis of left knee    Depression, unspecified depression type    Hypocalcemia    Thrombocytopenia (HCC)    Hypercholesterolemia    Other orders  -     Discontinue: escitalopram (LEXAPRO) 10 mg tablet; Take 1 tablet by mouth daily  -     Discontinue: ezetimibe (ZETIA) 10 mg tablet; Take 1 tablet by mouth daily  -     FLUoxetine (PROzac) 20 mg capsule; Take 1 capsule by mouth daily  -     Discontinue: LORazepam (ATIVAN) 1 mg tablet; Take 0 5 tablets by mouth daily  -     Discontinue: nadolol (CORGARD) 40 mg tablet; Take 1 tablet by mouth daily  -     Discontinue: simvastatin (ZOCOR) 40 mg tablet; Take 1 tablet by mouth daily  -     Discontinue: traMADol (ULTRAM) 50 mg tablet; Take 1 tablet by mouth  -     Discontinue: zolpidem (AMBIEN) 10 mg tablet; Take 1 tablet by mouth daily as needed  -     Cancel: FLUoxetine (PROzac) 20 mg capsule; Take 1 capsule (20 mg total) by mouth daily          Subjective:      Patient ID: Lee Posada is a 70 y o  female  79-year-old female is seen today for follow-up of chronic conditions  Laboratory studies reviewed today  It is worth noting that she does have elevation of blood glucose however she attributes this to poor eating habits around the holidays  Regarding her anxiety / depression, she is currently on both Lexapro 10 mg daily as well as fluoxetine 20 mg daily  She was started on Lexapro a little over a year ago while still taking fluoxetine  She also takes as needed Xanax 0 5 mg daily-p r n  She does report improvement in her anxiety /depression symptoms  Denies any side effects  She also reports having continued left knee pain which flares with occasional over exertion  She takes tramadol as needed for pain as well as NSAIDs, which controlled her pain  PDMP evaluated today, no concerns of medication misuse  Hypertension   This is a chronic problem   The current episode started more than 1 year ago  The problem is unchanged  The problem is controlled  Pertinent negatives include no chest pain, headaches, palpitations or shortness of breath  There are no associated agents to hypertension  Risk factors for coronary artery disease include dyslipidemia, obesity and sedentary lifestyle  Past treatments include beta blockers  The current treatment provides moderate improvement  Arthritis   Presents for follow-up visit  She complains of pain and stiffness  The symptoms have been stable  Affected locations include the left knee  Her pain is at a severity of 5/10  Pertinent negatives include no diarrhea, dysuria, fatigue or fever  Compliance with total regimen is %  Depression   This is a chronic problem  The current episode started more than 1 year ago  The problem occurs intermittently  The problem has been unchanged  Pertinent negatives include no abdominal pain, chest pain, chills, congestion, coughing, diaphoresis, fatigue, fever, headaches, nausea, numbness, sore throat, vomiting or weakness  Nothing aggravates the symptoms  Treatments tried: fluoxetine and Xanax-PRN  The treatment provided moderate relief  The following portions of the patient's history were reviewed and updated as appropriate: allergies, current medications, past family history, past medical history, past social history, past surgical history and problem list     Review of Systems   Constitutional: Negative for activity change, appetite change, chills, diaphoresis, fatigue and fever  HENT: Negative for congestion, postnasal drip, rhinorrhea, sinus pain, sinus pressure, sneezing and sore throat  Eyes: Negative for visual disturbance  Respiratory: Negative for apnea, cough, choking, chest tightness, shortness of breath and wheezing  Cardiovascular: Negative for chest pain, palpitations and leg swelling     Gastrointestinal: Negative for abdominal distention, abdominal pain, anal bleeding, blood in stool, constipation, diarrhea, nausea and vomiting  Endocrine: Negative for cold intolerance and heat intolerance  Genitourinary: Negative for difficulty urinating, dysuria and hematuria  Musculoskeletal: Positive for arthritis and stiffness  Skin: Negative  Neurological: Negative for dizziness, weakness, light-headedness, numbness and headaches  Hematological: Negative for adenopathy  Psychiatric/Behavioral: Positive for depression  Negative for agitation, sleep disturbance and suicidal ideas  All other systems reviewed and are negative          Past Medical History:   Diagnosis Date    Anxiety     last assessed 02/18/14    Endometriosis     Generalized osteoarthritis of multiple sites     last assessed 02/18/14    Hyperlipidemia     Hypertension     Major depression, single episode     mental status change    Pelvic adhesions     Squamous cell skin cancer     resolved 09/2007; back         Current Outpatient Prescriptions:     escitalopram (LEXAPRO) 10 mg tablet, Take 1 tablet (10 mg total) by mouth daily, Disp: 90 tablet, Rfl: 1    ezetimibe (ZETIA) 10 mg tablet, Take 1 tablet (10 mg total) by mouth daily, Disp: 90 tablet, Rfl: 1    FLUoxetine (PROzac) 20 mg capsule, Take 1 capsule by mouth daily, Disp: , Rfl:     LORazepam (ATIVAN) 1 mg tablet, Take 0 5 tablets (0 5 mg total) by mouth daily as needed for anxiety, Disp: 30 tablet, Rfl: 0    nadolol (CORGARD) 40 mg tablet, Take 1 tablet (40 mg total) by mouth daily, Disp: 90 tablet, Rfl: 1    simvastatin (ZOCOR) 40 mg tablet, Take 1 tablet (40 mg total) by mouth daily, Disp: 90 tablet, Rfl: 1    traMADol (ULTRAM) 50 mg tablet, Take 1 tablet (50 mg total) by mouth 2 (two) times a day, Disp: 60 tablet, Rfl: 0    zolpidem (AMBIEN) 10 mg tablet, Take 1 tablet (10 mg total) by mouth daily at bedtime as needed (prn), Disp: 30 tablet, Rfl: 0    Allergies   Allergen Reactions    Aspirin GI Intolerance    Oxycodone-Acetaminophen Vomiting       Social History   Past Surgical History:   Procedure Laterality Date    APPENDECTOMY      BREAST BIOPSY Left     stereotactic; percutaneous needle core use imag guide    CHOLECYSTECTOMY      PILONIDAL CYST EXCISION      TOTAL ABDOMINAL HYSTERECTOMY W/ BILATERAL SALPINGOOPHORECTOMY       Family History   Problem Relation Age of Onset    Breast cancer Mother     Lung cancer Father     Breast cancer Paternal Aunt        Objective:  /68 (BP Location: Left arm, Patient Position: Sitting, Cuff Size: Standard)   Pulse 58   Temp 98 3 °F (36 8 °C) (Tympanic)   Ht 5' 0 73" (1 542 m)   Wt 90 6 kg (199 lb 12 8 oz)   SpO2 97%   BMI 38 09 kg/m²     Recent Results (from the past 1344 hour(s))   CBC and differential    Collection Time: 04/04/18  7:50 AM   Result Value Ref Range    WBC 5 46 4 31 - 10 16 Thousand/uL    RBC 4 55 3 81 - 5 12 Million/uL    Hemoglobin 13 3 11 5 - 15 4 g/dL    Hematocrit 41 0 34 8 - 46 1 %    MCV 90 82 - 98 fL    MCH 29 2 26 8 - 34 3 pg    MCHC 32 4 31 4 - 37 4 g/dL    RDW 13 5 11 6 - 15 1 %    MPV 10 8 8 9 - 12 7 fL    Platelets 461 (L) 510 - 390 Thousands/uL    nRBC 0 /100 WBCs    Neutrophils Relative 59 43 - 75 %    Lymphocytes Relative 33 14 - 44 %    Monocytes Relative 6 4 - 12 %    Eosinophils Relative 2 0 - 6 %    Basophils Relative 0 0 - 1 %    Neutrophils Absolute 3 18 1 85 - 7 62 Thousands/µL    Lymphocytes Absolute 1 81 0 60 - 4 47 Thousands/µL    Monocytes Absolute 0 35 0 17 - 1 22 Thousand/µL    Eosinophils Absolute 0 09 0 00 - 0 61 Thousand/µL    Basophils Absolute 0 02 0 00 - 0 10 Thousands/µL   Basic metabolic panel    Collection Time: 04/04/18  7:50 AM   Result Value Ref Range    Sodium 141 136 - 145 mmol/L    Potassium 3 5 3 5 - 5 3 mmol/L    Chloride 106 100 - 108 mmol/L    CO2 28 21 - 32 mmol/L    Anion Gap 7 4 - 13 mmol/L    BUN 5 5 - 25 mg/dL    Creatinine 0 66 0 60 - 1 30 mg/dL    Glucose, Fasting 115 (H) 65 - 99 mg/dL Calcium 8 2 (L) 8 3 - 10 1 mg/dL    eGFR 90 ml/min/1 73sq m   Lipid panel    Collection Time: 04/04/18  7:50 AM   Result Value Ref Range    Cholesterol 124 50 - 200 mg/dL    Triglycerides 101 <=150 mg/dL    HDL, Direct 58 40 - 60 mg/dL    LDL Calculated 46 0 - 100 mg/dL            Physical Exam   Constitutional: She is oriented to person, place, and time  She appears well-developed and well-nourished  No distress  HENT:   Head: Normocephalic and atraumatic  Eyes: Conjunctivae and EOM are normal  Pupils are equal, round, and reactive to light  Right eye exhibits no discharge  Left eye exhibits no discharge  Neck: Normal range of motion  Neck supple  No JVD present  No thyromegaly present  Cardiovascular: Normal rate, regular rhythm, normal heart sounds and intact distal pulses  Exam reveals no gallop and no friction rub  No murmur heard  Pulmonary/Chest: Effort normal and breath sounds normal  No respiratory distress  She has no wheezes  She has no rales  She exhibits no tenderness  Abdominal: Soft  She exhibits no distension  There is no tenderness  Musculoskeletal: Normal range of motion  She exhibits no edema, tenderness or deformity  Lymphadenopathy:     She has no cervical adenopathy  Neurological: She is alert and oriented to person, place, and time  No cranial nerve deficit  Coordination normal    Skin: Skin is warm and dry  No rash noted  She is not diaphoretic  No erythema  No pallor  Psychiatric: She has a normal mood and affect  Her behavior is normal  Judgment and thought content normal    Nursing note and vitals reviewed

## 2018-05-21 DIAGNOSIS — L30.4 INTERTRIGO: Primary | ICD-10-CM

## 2018-05-22 RX ORDER — NYSTATIN 100000 [USP'U]/G
POWDER TOPICAL 3 TIMES DAILY
Qty: 15 G | Refills: 0 | Status: SHIPPED | OUTPATIENT
Start: 2018-05-22 | End: 2018-10-10 | Stop reason: SDUPTHER

## 2018-10-04 ENCOUNTER — APPOINTMENT (OUTPATIENT)
Dept: LAB | Age: 68
End: 2018-10-04
Payer: MEDICARE

## 2018-10-04 DIAGNOSIS — R73.01 IMPAIRED FASTING GLUCOSE: ICD-10-CM

## 2018-10-04 LAB
EST. AVERAGE GLUCOSE BLD GHB EST-MCNC: 114 MG/DL
HBA1C MFR BLD: 5.6 % (ref 4.2–6.3)

## 2018-10-04 PROCEDURE — 36415 COLL VENOUS BLD VENIPUNCTURE: CPT

## 2018-10-04 PROCEDURE — 83036 HEMOGLOBIN GLYCOSYLATED A1C: CPT

## 2018-10-10 ENCOUNTER — OFFICE VISIT (OUTPATIENT)
Dept: INTERNAL MEDICINE CLINIC | Age: 68
End: 2018-10-10
Payer: MEDICARE

## 2018-10-10 VITALS
HEIGHT: 59 IN | SYSTOLIC BLOOD PRESSURE: 148 MMHG | DIASTOLIC BLOOD PRESSURE: 60 MMHG | OXYGEN SATURATION: 97 % | TEMPERATURE: 97.9 F | HEART RATE: 58 BPM | WEIGHT: 203.2 LBS | BODY MASS INDEX: 40.96 KG/M2

## 2018-10-10 DIAGNOSIS — F32.A DEPRESSION, UNSPECIFIED DEPRESSION TYPE: ICD-10-CM

## 2018-10-10 DIAGNOSIS — M25.562 PAIN IN BOTH KNEES, UNSPECIFIED CHRONICITY: ICD-10-CM

## 2018-10-10 DIAGNOSIS — E78.00 HYPERCHOLESTEREMIA: ICD-10-CM

## 2018-10-10 DIAGNOSIS — M25.561 PAIN IN BOTH KNEES, UNSPECIFIED CHRONICITY: ICD-10-CM

## 2018-10-10 DIAGNOSIS — I10 HYPERTENSION, UNSPECIFIED TYPE: ICD-10-CM

## 2018-10-10 DIAGNOSIS — G47.00 INSOMNIA, UNSPECIFIED TYPE: ICD-10-CM

## 2018-10-10 DIAGNOSIS — L30.4 INTERTRIGO: ICD-10-CM

## 2018-10-10 DIAGNOSIS — I10 BENIGN ESSENTIAL HYPERTENSION: Primary | ICD-10-CM

## 2018-10-10 DIAGNOSIS — E78.00 HYPERCHOLESTEROLEMIA: ICD-10-CM

## 2018-10-10 DIAGNOSIS — M17.12 PRIMARY OSTEOARTHRITIS OF LEFT KNEE: ICD-10-CM

## 2018-10-10 DIAGNOSIS — F41.9 ANXIETY: ICD-10-CM

## 2018-10-10 PROBLEM — N95.2 VAGINAL ATROPHY: Status: RESOLVED | Noted: 2018-01-16 | Resolved: 2018-10-10

## 2018-10-10 PROCEDURE — 99214 OFFICE O/P EST MOD 30 MIN: CPT | Performed by: INTERNAL MEDICINE

## 2018-10-10 RX ORDER — MELOXICAM 15 MG/1
15 TABLET ORAL DAILY
Refills: 5 | COMMUNITY
Start: 2018-09-30 | End: 2019-02-25

## 2018-10-10 RX ORDER — TRAMADOL HYDROCHLORIDE 50 MG/1
50 TABLET ORAL 2 TIMES DAILY PRN
Qty: 60 TABLET | Refills: 0 | Status: SHIPPED | OUTPATIENT
Start: 2018-10-10 | End: 2019-06-27 | Stop reason: SDUPTHER

## 2018-10-10 RX ORDER — INFLUENZA A VIRUSA/MICHIGAN/45/2015 X-275 (H1N1) ANTIGEN (FORMALDEHYDE INACTIVATED), INFLUENZA A VIRUS A/HONG KONG/4801/2014 X-263B (H3N2) ANTIGEN (FORMALDEHYDE INACTIVATED), AND INFLUENZA B VIRUS B/BRISBANE/60/2008 ANTIGEN (FORMALDEHYDE INACTIVATED) 60; 60; 60 UG/.5ML; UG/.5ML; UG/.5ML
INJECTION, SUSPENSION INTRAMUSCULAR
Refills: 0 | COMMUNITY
Start: 2018-09-28 | End: 2018-10-10 | Stop reason: CLARIF

## 2018-10-10 RX ORDER — EZETIMIBE 10 MG/1
10 TABLET ORAL DAILY
Qty: 90 TABLET | Refills: 1 | Status: SHIPPED | OUTPATIENT
Start: 2018-10-10 | End: 2019-02-25 | Stop reason: SDUPTHER

## 2018-10-10 RX ORDER — ESCITALOPRAM OXALATE 10 MG/1
10 TABLET ORAL DAILY
Qty: 90 TABLET | Refills: 1 | Status: SHIPPED | OUTPATIENT
Start: 2018-10-10 | End: 2019-02-25 | Stop reason: SDUPTHER

## 2018-10-10 RX ORDER — NADOLOL 40 MG/1
40 TABLET ORAL DAILY
Qty: 90 TABLET | Refills: 1 | Status: SHIPPED | OUTPATIENT
Start: 2018-10-10 | End: 2019-02-25 | Stop reason: SDUPTHER

## 2018-10-10 RX ORDER — ZOLPIDEM TARTRATE 10 MG/1
10 TABLET ORAL
Qty: 30 TABLET | Refills: 5 | Status: SHIPPED | OUTPATIENT
Start: 2018-10-10 | End: 2019-02-25 | Stop reason: SDUPTHER

## 2018-10-10 RX ORDER — PNEUMOCOCCAL 13-VALENT CONJUGATE VACCINE 2.2; 2.2; 2.2; 2.2; 2.2; 4.4; 2.2; 2.2; 2.2; 2.2; 2.2; 2.2; 2.2 UG/.5ML; UG/.5ML; UG/.5ML; UG/.5ML; UG/.5ML; UG/.5ML; UG/.5ML; UG/.5ML; UG/.5ML; UG/.5ML; UG/.5ML; UG/.5ML; UG/.5ML
INJECTION, SUSPENSION INTRAMUSCULAR
Refills: 0 | COMMUNITY
Start: 2018-09-28 | End: 2018-10-10 | Stop reason: CLARIF

## 2018-10-10 RX ORDER — LORAZEPAM 1 MG/1
0.5 TABLET ORAL DAILY PRN
Qty: 30 TABLET | Refills: 5 | Status: SHIPPED | OUTPATIENT
Start: 2018-10-10 | End: 2019-02-25 | Stop reason: SDUPTHER

## 2018-10-10 RX ORDER — NYSTATIN 100000 [USP'U]/G
POWDER TOPICAL 3 TIMES DAILY
Qty: 15 G | Refills: 3 | Status: SHIPPED | OUTPATIENT
Start: 2018-10-10 | End: 2019-06-27 | Stop reason: SDUPTHER

## 2018-10-10 RX ORDER — SIMVASTATIN 40 MG
40 TABLET ORAL DAILY
Qty: 90 TABLET | Refills: 1 | Status: SHIPPED | OUTPATIENT
Start: 2018-10-10 | End: 2019-02-25 | Stop reason: SDUPTHER

## 2018-10-10 NOTE — PROGRESS NOTES
Assessment/Plan:    No problem-specific Assessment & Plan notes found for this encounter  Diagnoses and all orders for this visit:    Benign essential hypertension  Hypertension is poor  I will recommend her to check her blood pressure at home at least weekly or every other week and write down the number for me in this way I can adjust the dose of her hypertensive medication in case if I need to  Primary osteoarthritis of left knee   is followed up by the Orthopedics for primary osteoarthritis of the knee she may need total knee replacement but right now she is living without it  Depression, unspecified depression type   depression and anxiety is stable  I will continue with the same medication and see her back in about 4 months  Insomnia, unspecified type   patient need Ambien to sleep if she do not take Ambien she cannot sleep but Ambien give her a good night's sleep will continue with the medication and so far she does not abuse it and she taking it as needed and sometimes regularly but no extra medication or abuse of the medication  Other orders  -     Discontinue: FLUZONE HIGH-DOSE 0 5 ML MONCHO; TO BE ADMINISTERED BY PHARMACIST FOR IMMUNIZATION  -     Discontinue: PREVNAR 13; TO BE ADMINISTERED BY PHARMACIST FOR IMMUNIZATION  -     meloxicam (MOBIC) 15 mg tablet; Take 15 mg by mouth daily With food          Subjective:    no new complaint except for the knee pain   Patient ID: Cherelle Berger is a 70 y o  female  HPI    The following portions of the patient's history were reviewed and updated as appropriate: allergies, current medications, past family history, past medical history, past social history, past surgical history and problem list     Review of Systems   Constitutional: Negative for activity change, appetite change, chills, diaphoresis, fatigue and fever  HENT: Negative for congestion, postnasal drip, rhinorrhea, sinus pain, sinus pressure, sneezing and sore throat      Eyes: Negative for visual disturbance  Respiratory: Negative for apnea, cough, choking, chest tightness, shortness of breath and wheezing  Cardiovascular: Negative for chest pain, palpitations and leg swelling  Gastrointestinal: Negative for abdominal distention, abdominal pain, anal bleeding, blood in stool, constipation, diarrhea, nausea and vomiting  Endocrine: Negative for cold intolerance and heat intolerance  Genitourinary: Negative for difficulty urinating, dysuria and hematuria  Skin: Negative  Neurological: Negative for dizziness, weakness, light-headedness, numbness and headaches  Hematological: Negative for adenopathy  Psychiatric/Behavioral: Negative for agitation, sleep disturbance and suicidal ideas  All other systems reviewed and are negative          Past Medical History:   Diagnosis Date    Anxiety     last assessed 02/18/14    Endometriosis     Generalized osteoarthritis of multiple sites     last assessed 02/18/14    Hyperlipidemia     Hypertension     Left knee pain     Major depression, single episode     mental status change    Pelvic adhesions     Squamous cell skin cancer     resolved 09/2007; back         Current Outpatient Prescriptions:     escitalopram (LEXAPRO) 10 mg tablet, Take 1 tablet (10 mg total) by mouth daily, Disp: 90 tablet, Rfl: 1    ezetimibe (ZETIA) 10 mg tablet, Take 1 tablet (10 mg total) by mouth daily, Disp: 90 tablet, Rfl: 1    LORazepam (ATIVAN) 1 mg tablet, Take 0 5 tablets (0 5 mg total) by mouth daily as needed for anxiety, Disp: 30 tablet, Rfl: 0    meloxicam (MOBIC) 15 mg tablet, Take 15 mg by mouth daily With food, Disp: , Rfl: 5    nadolol (CORGARD) 40 mg tablet, Take 1 tablet (40 mg total) by mouth daily, Disp: 90 tablet, Rfl: 1    nystatin (MYCOSTATIN) powder, Apply topically 3 (three) times a day, Disp: 15 g, Rfl: 0    simvastatin (ZOCOR) 40 mg tablet, Take 1 tablet (40 mg total) by mouth daily, Disp: 90 tablet, Rfl: 1    traMADol (ULTRAM) 50 mg tablet, Take 1 tablet (50 mg total) by mouth 2 (two) times a day as needed for moderate pain, Disp: 60 tablet, Rfl: 5    zolpidem (AMBIEN) 10 mg tablet, Take 1 tablet (10 mg total) by mouth daily at bedtime as needed (prn), Disp: 30 tablet, Rfl: 5    Allergies   Allergen Reactions    Aspirin GI Intolerance    Oxycodone-Acetaminophen Vomiting     darvocet       Social History   Past Surgical History:   Procedure Laterality Date    APPENDECTOMY      BREAST BIOPSY Left     stereotactic; percutaneous needle core use imag guide    CHOLECYSTECTOMY      PILONIDAL CYST EXCISION      TOTAL ABDOMINAL HYSTERECTOMY W/ BILATERAL SALPINGOOPHORECTOMY       Family History   Problem Relation Age of Onset    Breast cancer Mother     Lung cancer Father     Breast cancer Paternal Aunt        Objective:  /60 (BP Location: Left arm)   Pulse 58   Temp 97 9 °F (36 6 °C) (Tympanic)   Ht 4' 11 45" (1 51 m)   Wt 92 2 kg (203 lb 3 2 oz)   SpO2 97%   BMI 40 42 kg/m²        Physical Exam   Constitutional: She is oriented to person, place, and time  She appears well-developed and well-nourished  No distress  HENT:   Head: Normocephalic and atraumatic  Eyes: Pupils are equal, round, and reactive to light  Conjunctivae and EOM are normal  Right eye exhibits no discharge  Left eye exhibits no discharge  Neck: Normal range of motion  Neck supple  No JVD present  No thyromegaly present  Cardiovascular: Normal rate, regular rhythm, normal heart sounds and intact distal pulses  Exam reveals no gallop and no friction rub  No murmur heard  Pulmonary/Chest: Effort normal and breath sounds normal  No respiratory distress  She has no wheezes  She has no rales  She exhibits no tenderness  Abdominal: Soft  She exhibits no distension  There is no tenderness  Musculoskeletal: Normal range of motion  She exhibits no edema, tenderness or deformity  Lymphadenopathy:     She has no cervical adenopathy     Neurological: She is alert and oriented to person, place, and time  No cranial nerve deficit  Coordination normal    Skin: Skin is warm and dry  No rash noted  She is not diaphoretic  No erythema  No pallor  Psychiatric: She has a normal mood and affect  Her behavior is normal  Judgment and thought content normal    Nursing note and vitals reviewed          Recent Results (from the past 672 hour(s))   HEMOGLOBIN A1C W/ EAG ESTIMATION    Collection Time: 10/04/18  9:28 AM   Result Value Ref Range    Hemoglobin A1C 5 6 4 2 - 6 3 %     mg/dl

## 2019-01-18 ENCOUNTER — ANNUAL EXAM (OUTPATIENT)
Dept: OBGYN CLINIC | Facility: CLINIC | Age: 69
End: 2019-01-18
Payer: MEDICARE

## 2019-01-18 VITALS
BODY MASS INDEX: 38.63 KG/M2 | WEIGHT: 204.6 LBS | SYSTOLIC BLOOD PRESSURE: 132 MMHG | HEIGHT: 61 IN | DIASTOLIC BLOOD PRESSURE: 78 MMHG

## 2019-01-18 DIAGNOSIS — L30.4 INTERTRIGO: ICD-10-CM

## 2019-01-18 DIAGNOSIS — Z12.39 ENCOUNTER FOR SCREENING FOR MALIGNANT NEOPLASM OF BREAST: ICD-10-CM

## 2019-01-18 DIAGNOSIS — Z01.419 ENCOUNTER FOR GYNECOLOGICAL EXAMINATION (GENERAL) (ROUTINE) WITHOUT ABNORMAL FINDINGS: Primary | ICD-10-CM

## 2019-01-18 PROCEDURE — G0101 CA SCREEN;PELVIC/BREAST EXAM: HCPCS | Performed by: NURSE PRACTITIONER

## 2019-01-18 RX ORDER — FLUCONAZOLE 150 MG/1
150 TABLET ORAL ONCE
Qty: 2 TABLET | Refills: 3 | Status: SHIPPED | OUTPATIENT
Start: 2019-01-18 | End: 2019-01-18 | Stop reason: CLARIF

## 2019-01-18 RX ORDER — FLUCONAZOLE 150 MG/1
TABLET ORAL
Qty: 2 TABLET | Refills: 3 | Status: SHIPPED | OUTPATIENT
Start: 2019-01-18 | End: 2019-01-21

## 2019-01-18 RX ORDER — SODIUM FLUORIDE 6 MG/ML
PASTE, DENTIFRICE DENTAL
Refills: 3 | COMMUNITY
Start: 2018-11-27 | End: 2019-12-30 | Stop reason: ALTCHOICE

## 2019-01-22 PROBLEM — L30.4 INTERTRIGO: Status: ACTIVE | Noted: 2019-01-22

## 2019-01-22 NOTE — ASSESSMENT & PLAN NOTE
Benign findings on routine gyn exam  Recommended monthly SBE, annual CBE and annual screening mammo  ASCCP guidelines reviewed and pap with cotesting noted to be up to date; this low risk patient was advised she meets criteria to d/c pap screening given age >71  Colonoscopy up to date per pt  The patient denies STI risk factors and declines testing at this time  Reviewed diet/activity recommendations  Discussed postmenopausal considerations and symptoms to report  RTO in one year for routine annual gyn exam or sooner PRN

## 2019-01-22 NOTE — ASSESSMENT & PLAN NOTE
Present over abd kenyon  Reviewed conservative skin care and benefits of drying thoroughly after bathing  Recommended continuing nystatin powder PRN and advised trial of diflucan x2 doses due to upcoming surgery   The patient agrees with plan

## 2019-01-22 NOTE — PROGRESS NOTES
Assessment/Plan:    Encounter for gynecological examination (general) (routine) without abnormal findings  Benign findings on routine gyn exam  Recommended monthly SBE, annual CBE and annual screening mammo  ASCCP guidelines reviewed and pap with cotesting noted to be up to date; this low risk patient was advised she meets criteria to d/c pap screening given age >71  Colonoscopy up to date per pt  The patient denies STI risk factors and declines testing at this time  Reviewed diet/activity recommendations  Discussed postmenopausal considerations and symptoms to report  RTO in one year for routine annual gyn exam or sooner PRN  Intertrigo  Present over abd pannis  Reviewed conservative skin care and benefits of drying thoroughly after bathing  Recommended continuing nystatin powder PRN and advised trial of diflucan x2 doses due to upcoming surgery  The patient agrees with plan        Diagnoses and all orders for this visit:    Encounter for gynecological examination (general) (routine) without abnormal findings    Encounter for screening for malignant neoplasm of breast  -     Mammo screening bilateral w cad; Future    Intertrigo  -     Discontinue: fluconazole (DIFLUCAN) 150 mg tablet; Take 1 tablet (150 mg total) by mouth once for 1 dose  -     fluconazole (DIFLUCAN) 150 mg tablet; One tablet by mouth on day one and one tablet by mouth on day four    Other orders  -     PREVIDENT 5000 BOOSTER PLUS 1 1 % PSTE; BRUSH THOROUGHLY, 1-2 TIMES DAILY FOR 2 MINUTES, DO NOT RINSE, DO NOT EAT OR DRINK 30 MIN AFTER          Subjective:      Patient ID: Stella Escamilla is a 70 y o  female  This patient presents for routine annual gyn exam  C/o recurrent yeast inf sx in abd [facility]  Nystatin powder has not been effective  This is bothersome and smells bad  She has TKR planned soon and would like to have this cleared up prior to surgery   S/p AYLEEN/BSO for benign indication  She denies acute gyn complaints otherwise   She denies  bleeding or spotting, VM sx, pelvic pain, breast concerns, abn discharge, bowel/bladder dysfunction, depression/anx   and monog  Denies STI concerns  The following portions of the patient's history were reviewed and updated as appropriate: allergies, current medications, past family history, past medical history, past social history, past surgical history and problem list     Review of Systems   Constitutional: Negative  HENT: Negative  Eyes: Negative  Respiratory: Negative  Cardiovascular: Negative  Gastrointestinal: Negative  Endocrine: Negative  Genitourinary: Negative  Musculoskeletal: Negative  Skin: Positive for rash  Allergic/Immunologic: Negative  Neurological: Negative  Hematological: Negative  Psychiatric/Behavioral: Negative  Objective:      /78 (BP Location: Left arm, Cuff Size: Large)   Ht 5' 0 5" (1 537 m)   Wt 92 8 kg (204 lb 9 6 oz)   BMI 39 30 kg/m²          Physical Exam   Constitutional: She is oriented to person, place, and time  She appears well-developed and well-nourished  HENT:   Head: Normocephalic and atraumatic  Eyes: Pupils are equal, round, and reactive to light  EOM are normal    Neck: Normal range of motion  Neck supple  No thyromegaly present  Cardiovascular: Normal rate, regular rhythm and normal heart sounds  Pulmonary/Chest: Effort normal and breath sounds normal  No respiratory distress  She has no wheezes  She has no rales  She exhibits no mass, no tenderness and no deformity  Right breast exhibits no inverted nipple, no mass, no nipple discharge, no skin change and no tenderness  Left breast exhibits no inverted nipple, no mass, no nipple discharge, no skin change and no tenderness  Breasts are symmetrical    Abdominal: Soft  She exhibits no distension and no mass  There is no splenomegaly or hepatomegaly  There is no tenderness  There is no rebound and no guarding         Genitourinary: Rectum normal and vagina normal        No breast swelling, tenderness or discharge  No labial fusion  There is no rash, tenderness, lesion or injury on the right labia  There is no rash, tenderness, lesion or injury on the left labia  No erythema, tenderness or bleeding in the vagina  No foreign body in the vagina  No vaginal discharge found  Musculoskeletal: Normal range of motion  Lymphadenopathy:     She has no cervical adenopathy  She has no axillary adenopathy  Neurological: She is alert and oriented to person, place, and time  No cranial nerve deficit  Skin: Skin is warm and dry  No rash noted  No cyanosis  Nails show no clubbing  Psychiatric: She has a normal mood and affect   Her speech is normal and behavior is normal  Judgment and thought content normal  Cognition and memory are normal

## 2019-02-19 ENCOUNTER — APPOINTMENT (OUTPATIENT)
Dept: LAB | Age: 69
End: 2019-02-19
Payer: MEDICARE

## 2019-02-19 DIAGNOSIS — F32.A DEPRESSION, UNSPECIFIED DEPRESSION TYPE: ICD-10-CM

## 2019-02-19 DIAGNOSIS — I10 BENIGN ESSENTIAL HYPERTENSION: ICD-10-CM

## 2019-02-19 DIAGNOSIS — M17.12 PRIMARY OSTEOARTHRITIS OF LEFT KNEE: ICD-10-CM

## 2019-02-19 LAB
ALBUMIN SERPL BCP-MCNC: 3.1 G/DL (ref 3.5–5)
ALP SERPL-CCNC: 115 U/L (ref 46–116)
ALT SERPL W P-5'-P-CCNC: 15 U/L (ref 12–78)
ANION GAP SERPL CALCULATED.3IONS-SCNC: 9 MMOL/L (ref 4–13)
AST SERPL W P-5'-P-CCNC: 20 U/L (ref 5–45)
BASOPHILS # BLD AUTO: 0.04 THOUSANDS/ΜL (ref 0–0.1)
BASOPHILS NFR BLD AUTO: 1 % (ref 0–1)
BILIRUB SERPL-MCNC: 0.77 MG/DL (ref 0.2–1)
BUN SERPL-MCNC: 6 MG/DL (ref 5–25)
CALCIUM SERPL-MCNC: 8.4 MG/DL (ref 8.3–10.1)
CHLORIDE SERPL-SCNC: 104 MMOL/L (ref 100–108)
CHOLEST SERPL-MCNC: 120 MG/DL (ref 50–200)
CO2 SERPL-SCNC: 27 MMOL/L (ref 21–32)
CREAT SERPL-MCNC: 0.71 MG/DL (ref 0.6–1.3)
EOSINOPHIL # BLD AUTO: 0.14 THOUSAND/ΜL (ref 0–0.61)
EOSINOPHIL NFR BLD AUTO: 3 % (ref 0–6)
ERYTHROCYTE [DISTWIDTH] IN BLOOD BY AUTOMATED COUNT: 14.6 % (ref 11.6–15.1)
GFR SERPL CREATININE-BSD FRML MDRD: 87 ML/MIN/1.73SQ M
GLUCOSE P FAST SERPL-MCNC: 99 MG/DL (ref 65–99)
HCT VFR BLD AUTO: 38.9 % (ref 34.8–46.1)
HDLC SERPL-MCNC: 47 MG/DL (ref 40–60)
HGB BLD-MCNC: 12.3 G/DL (ref 11.5–15.4)
IMM GRANULOCYTES # BLD AUTO: 0.01 THOUSAND/UL (ref 0–0.2)
IMM GRANULOCYTES NFR BLD AUTO: 0 % (ref 0–2)
LDLC SERPL CALC-MCNC: 53 MG/DL (ref 0–100)
LYMPHOCYTES # BLD AUTO: 1.76 THOUSANDS/ΜL (ref 0.6–4.47)
LYMPHOCYTES NFR BLD AUTO: 33 % (ref 14–44)
MCH RBC QN AUTO: 29.1 PG (ref 26.8–34.3)
MCHC RBC AUTO-ENTMCNC: 31.6 G/DL (ref 31.4–37.4)
MCV RBC AUTO: 92 FL (ref 82–98)
MONOCYTES # BLD AUTO: 0.39 THOUSAND/ΜL (ref 0.17–1.22)
MONOCYTES NFR BLD AUTO: 7 % (ref 4–12)
NEUTROPHILS # BLD AUTO: 3.01 THOUSANDS/ΜL (ref 1.85–7.62)
NEUTS SEG NFR BLD AUTO: 56 % (ref 43–75)
NONHDLC SERPL-MCNC: 73 MG/DL
NRBC BLD AUTO-RTO: 0 /100 WBCS
PLATELET # BLD AUTO: 183 THOUSANDS/UL (ref 149–390)
PMV BLD AUTO: 11.4 FL (ref 8.9–12.7)
POTASSIUM SERPL-SCNC: 3.9 MMOL/L (ref 3.5–5.3)
PROT SERPL-MCNC: 6.6 G/DL (ref 6.4–8.2)
RBC # BLD AUTO: 4.23 MILLION/UL (ref 3.81–5.12)
SODIUM SERPL-SCNC: 140 MMOL/L (ref 136–145)
TRIGL SERPL-MCNC: 99 MG/DL
TSH SERPL DL<=0.05 MIU/L-ACNC: 3.18 UIU/ML (ref 0.36–3.74)
WBC # BLD AUTO: 5.35 THOUSAND/UL (ref 4.31–10.16)

## 2019-02-19 PROCEDURE — 36415 COLL VENOUS BLD VENIPUNCTURE: CPT

## 2019-02-19 PROCEDURE — 84443 ASSAY THYROID STIM HORMONE: CPT

## 2019-02-19 PROCEDURE — 80053 COMPREHEN METABOLIC PANEL: CPT

## 2019-02-19 PROCEDURE — 85025 COMPLETE CBC W/AUTO DIFF WBC: CPT

## 2019-02-19 PROCEDURE — 80061 LIPID PANEL: CPT

## 2019-02-25 ENCOUNTER — OFFICE VISIT (OUTPATIENT)
Dept: INTERNAL MEDICINE CLINIC | Age: 69
End: 2019-02-25
Payer: MEDICARE

## 2019-02-25 VITALS
OXYGEN SATURATION: 96 % | HEIGHT: 61 IN | SYSTOLIC BLOOD PRESSURE: 132 MMHG | WEIGHT: 199 LBS | DIASTOLIC BLOOD PRESSURE: 82 MMHG | TEMPERATURE: 97.1 F | HEART RATE: 62 BPM | BODY MASS INDEX: 37.57 KG/M2

## 2019-02-25 DIAGNOSIS — I10 BENIGN ESSENTIAL HYPERTENSION: ICD-10-CM

## 2019-02-25 DIAGNOSIS — Z12.11 SCREENING FOR COLON CANCER: Primary | ICD-10-CM

## 2019-02-25 DIAGNOSIS — G47.00 INSOMNIA, UNSPECIFIED TYPE: ICD-10-CM

## 2019-02-25 DIAGNOSIS — E78.00 HYPERCHOLESTEREMIA: ICD-10-CM

## 2019-02-25 DIAGNOSIS — M17.12 PRIMARY OSTEOARTHRITIS OF LEFT KNEE: ICD-10-CM

## 2019-02-25 DIAGNOSIS — F41.9 ANXIETY: ICD-10-CM

## 2019-02-25 DIAGNOSIS — I10 HYPERTENSION, UNSPECIFIED TYPE: ICD-10-CM

## 2019-02-25 DIAGNOSIS — E78.00 HYPERCHOLESTEROLEMIA: ICD-10-CM

## 2019-02-25 PROBLEM — Z96.652 STATUS POST TOTAL LEFT KNEE REPLACEMENT: Status: ACTIVE | Noted: 2019-02-25

## 2019-02-25 PROCEDURE — 99214 OFFICE O/P EST MOD 30 MIN: CPT | Performed by: INTERNAL MEDICINE

## 2019-02-25 RX ORDER — LORAZEPAM 1 MG/1
0.5 TABLET ORAL DAILY PRN
Qty: 30 TABLET | Refills: 5 | Status: SHIPPED | OUTPATIENT
Start: 2019-02-25 | End: 2019-06-27 | Stop reason: SDUPTHER

## 2019-02-25 RX ORDER — SIMVASTATIN 40 MG
40 TABLET ORAL DAILY
Qty: 90 TABLET | Refills: 1 | Status: SHIPPED | OUTPATIENT
Start: 2019-02-25 | End: 2019-06-27 | Stop reason: SDUPTHER

## 2019-02-25 RX ORDER — EZETIMIBE 10 MG/1
10 TABLET ORAL DAILY
Qty: 90 TABLET | Refills: 1 | Status: SHIPPED | OUTPATIENT
Start: 2019-02-25 | End: 2019-06-27 | Stop reason: SDUPTHER

## 2019-02-25 RX ORDER — NADOLOL 40 MG/1
40 TABLET ORAL DAILY
Qty: 90 TABLET | Refills: 1 | Status: SHIPPED | OUTPATIENT
Start: 2019-02-25 | End: 2019-06-27 | Stop reason: SDUPTHER

## 2019-02-25 RX ORDER — ESCITALOPRAM OXALATE 10 MG/1
10 TABLET ORAL DAILY
Qty: 90 TABLET | Refills: 1 | Status: SHIPPED | OUTPATIENT
Start: 2019-02-25 | End: 2019-06-27 | Stop reason: SDUPTHER

## 2019-02-25 RX ORDER — ZOLPIDEM TARTRATE 10 MG/1
10 TABLET ORAL
Qty: 30 TABLET | Refills: 5 | Status: SHIPPED | OUTPATIENT
Start: 2019-02-25 | End: 2019-06-27 | Stop reason: SDUPTHER

## 2019-02-25 NOTE — PROGRESS NOTES
Assessment/Plan:    No problem-specific Assessment & Plan notes found for this encounter  Diagnoses and all orders for this visit:    Screening for colon cancer  -     Ambulatory referral to Gastroenterology; Future    Anxiety  -     escitalopram (LEXAPRO) 10 mg tablet; Take 1 tablet (10 mg total) by mouth daily  -     LORazepam (ATIVAN) 1 mg tablet; Take 0 5 tablets (0 5 mg total) by mouth daily as needed for anxiety  Anxiety and depression is better patient is on is citalopram and lorazepam she is doing well with that  Hypercholesteremia  -     ezetimibe (ZETIA) 10 mg tablet; Take 1 tablet (10 mg total) by mouth daily  -     simvastatin (ZOCOR) 40 mg tablet; Take 1 tablet (40 mg total) by mouth daily  Lipid panel was excellent both the HDL LDL total cholesterol and triglycerides were normal continue with the same dose no changes  Hypertension, unspecified type  -     nadolol (CORGARD) 40 mg tablet; Take 1 tablet (40 mg total) by mouth daily  Hypertension is good controlled systolic was 288 it could be better but will not change any medication continue with the same medication does  Insomnia, unspecified type  -     zolpidem (AMBIEN) 10 mg tablet; Take 1 tablet (10 mg total) by mouth daily at bedtime as needed (prn)      Primary osteoarthritis of left knee  Status post total knee repair  Hypercholesterolemia        Subjective: This is the 70 years young lady who is here for regular follow-up of her multiple medical problems which are mostly stable she is status post total knee replacement she is doing better she is in physical therapy   Patient ID: Praveena Peres is a 70 y o  female  Dereck Drought is here for a follow-up visit for her HTN, hyperlipidemia, osteoarthritis  She had a knee replacement surgery of her L knee on 1/23/19  She has been doing PT since her procedure, resulting in improvement  Pain is improved with tramadol and ibuprofen  Hypertension   This is a chronic problem   The current episode started more than 1 year ago  The problem has been waxing and waning since onset  The problem is controlled  Pertinent negatives include no anxiety, blurred vision, chest pain, headaches, malaise/fatigue, neck pain, orthopnea, palpitations, peripheral edema, PND, shortness of breath or sweats  Risk factors for coronary artery disease include dyslipidemia, family history, obesity and post-menopausal state  Compliance problems include exercise  Review of Systems   Constitutional: Positive for appetite change and fatigue  Negative for malaise/fatigue  Decreased appetite since the surgery on 1/23/19  Patient feels that she's lost weight  Eyes: Negative  Negative for blurred vision  Respiratory: Negative for shortness of breath  Cardiovascular: Negative for chest pain, palpitations, orthopnea and PND  Gastrointestinal: Positive for constipation  Last constipated 1 week ago, likely due to Tramadol use for knee pain   Musculoskeletal: Negative for neck pain  Neurological: Negative for headaches  Objective:      /82 (BP Location: Left arm, Patient Position: Sitting, Cuff Size: Standard)   Pulse 62   Temp (!) 97 1 °F (36 2 °C) (Tympanic)   Ht 5' 0 79" (1 544 m) Comment: shoes off  Wt 90 3 kg (199 lb) Comment: shoes off  SpO2 96%   BMI 37 86 kg/m²          Physical Exam   Constitutional: She is oriented to person, place, and time  She appears well-developed and well-nourished  No distress  HENT:   Head: Normocephalic and atraumatic  Eyes: Pupils are equal, round, and reactive to light  Conjunctivae and EOM are normal  Right eye exhibits no discharge  Left eye exhibits no discharge  Neck: Normal range of motion  Neck supple  No JVD present  No thyromegaly present  Cardiovascular: Normal rate, regular rhythm, normal heart sounds and intact distal pulses  Exam reveals no gallop and no friction rub  No murmur heard    Pulmonary/Chest: Effort normal and breath sounds normal  No respiratory distress  She has no wheezes  She has no rales  She exhibits no tenderness  Abdominal: Soft  She exhibits no distension  There is no tenderness  Musculoskeletal: Normal range of motion  She exhibits no edema, tenderness or deformity  Lymphadenopathy:     She has no cervical adenopathy  Neurological: She is alert and oriented to person, place, and time  No cranial nerve deficit  Coordination normal    Skin: Skin is warm and dry  No rash noted  She is not diaphoretic  No erythema  No pallor  Psychiatric: She has a normal mood and affect  Her behavior is normal  Judgment and thought content normal    Nursing note and vitals reviewed

## 2019-04-04 ENCOUNTER — HOSPITAL ENCOUNTER (OUTPATIENT)
Dept: RADIOLOGY | Age: 69
Discharge: HOME | End: 2019-04-04
Payer: MEDICARE

## 2019-04-04 VITALS — HEIGHT: 61 IN | WEIGHT: 199 LBS | BODY MASS INDEX: 37.57 KG/M2

## 2019-04-04 DIAGNOSIS — Z12.39 ENCOUNTER FOR SCREENING FOR MALIGNANT NEOPLASM OF BREAST: ICD-10-CM

## 2019-04-04 DIAGNOSIS — Z12.31 ENCOUNTER FOR SCREENING MAMMOGRAM FOR MALIGNANT NEOPLASM OF BREAST: ICD-10-CM

## 2019-04-04 PROCEDURE — 77067 SCR MAMMO BI INCL CAD: CPT

## 2019-06-27 ENCOUNTER — OFFICE VISIT (OUTPATIENT)
Dept: INTERNAL MEDICINE CLINIC | Age: 69
End: 2019-06-27
Payer: MEDICARE

## 2019-06-27 VITALS
HEART RATE: 74 BPM | BODY MASS INDEX: 38.01 KG/M2 | DIASTOLIC BLOOD PRESSURE: 72 MMHG | SYSTOLIC BLOOD PRESSURE: 138 MMHG | WEIGHT: 199.8 LBS | TEMPERATURE: 98.2 F | OXYGEN SATURATION: 95 %

## 2019-06-27 DIAGNOSIS — B00.1 COLD SORE: Primary | ICD-10-CM

## 2019-06-27 DIAGNOSIS — E78.00 HYPERCHOLESTEROLEMIA: ICD-10-CM

## 2019-06-27 DIAGNOSIS — I10 BENIGN ESSENTIAL HYPERTENSION: ICD-10-CM

## 2019-06-27 DIAGNOSIS — R73.01 IMPAIRED FASTING GLUCOSE: ICD-10-CM

## 2019-06-27 DIAGNOSIS — M25.562 PAIN IN BOTH KNEES, UNSPECIFIED CHRONICITY: ICD-10-CM

## 2019-06-27 DIAGNOSIS — D69.6 THROMBOCYTOPENIA (HCC): ICD-10-CM

## 2019-06-27 DIAGNOSIS — L30.4 INTERTRIGO: ICD-10-CM

## 2019-06-27 DIAGNOSIS — E78.00 HYPERCHOLESTEREMIA: ICD-10-CM

## 2019-06-27 DIAGNOSIS — F41.9 ANXIETY: ICD-10-CM

## 2019-06-27 DIAGNOSIS — M25.561 PAIN IN BOTH KNEES, UNSPECIFIED CHRONICITY: ICD-10-CM

## 2019-06-27 DIAGNOSIS — I10 HYPERTENSION, UNSPECIFIED TYPE: ICD-10-CM

## 2019-06-27 DIAGNOSIS — G47.00 INSOMNIA, UNSPECIFIED TYPE: ICD-10-CM

## 2019-06-27 PROBLEM — M17.12 PRIMARY OSTEOARTHRITIS OF LEFT KNEE: Status: RESOLVED | Noted: 2018-04-09 | Resolved: 2019-06-27

## 2019-06-27 PROCEDURE — 99214 OFFICE O/P EST MOD 30 MIN: CPT | Performed by: INTERNAL MEDICINE

## 2019-06-27 RX ORDER — TRAMADOL HYDROCHLORIDE 50 MG/1
50 TABLET ORAL 2 TIMES DAILY PRN
Qty: 60 TABLET | Refills: 0 | Status: SHIPPED | OUTPATIENT
Start: 2019-06-27 | End: 2019-12-30 | Stop reason: SDUPTHER

## 2019-06-27 RX ORDER — ACYCLOVIR 50 MG/G
OINTMENT TOPICAL 3 TIMES DAILY PRN
Qty: 15 G | Refills: 0 | Status: SHIPPED | OUTPATIENT
Start: 2019-06-27 | End: 2019-12-30 | Stop reason: SDUPTHER

## 2019-06-27 RX ORDER — LORAZEPAM 1 MG/1
0.5 TABLET ORAL DAILY PRN
Qty: 30 TABLET | Refills: 5 | Status: SHIPPED | OUTPATIENT
Start: 2019-06-27 | End: 2019-12-30 | Stop reason: SDUPTHER

## 2019-06-27 RX ORDER — NADOLOL 40 MG/1
40 TABLET ORAL DAILY
Qty: 90 TABLET | Refills: 1 | Status: SHIPPED | OUTPATIENT
Start: 2019-06-27 | End: 2019-12-30 | Stop reason: SDUPTHER

## 2019-06-27 RX ORDER — ZOLPIDEM TARTRATE 10 MG/1
10 TABLET ORAL
Qty: 30 TABLET | Refills: 5 | Status: SHIPPED | OUTPATIENT
Start: 2019-06-27 | End: 2019-12-30 | Stop reason: SDUPTHER

## 2019-06-27 RX ORDER — ESCITALOPRAM OXALATE 10 MG/1
10 TABLET ORAL DAILY
Qty: 90 TABLET | Refills: 1 | Status: SHIPPED | OUTPATIENT
Start: 2019-06-27 | End: 2019-12-30 | Stop reason: SDUPTHER

## 2019-06-27 RX ORDER — SIMVASTATIN 40 MG
40 TABLET ORAL DAILY
Qty: 90 TABLET | Refills: 1 | Status: SHIPPED | OUTPATIENT
Start: 2019-06-27 | End: 2019-12-30 | Stop reason: SDUPTHER

## 2019-06-27 RX ORDER — SODIUM FLUORIDE 6 MG/ML
PASTE, DENTIFRICE DENTAL
Refills: 3 | Status: CANCELLED | OUTPATIENT
Start: 2019-06-27

## 2019-06-27 RX ORDER — ACYCLOVIR 50 MG/G
OINTMENT TOPICAL
COMMUNITY
End: 2019-06-27 | Stop reason: SDUPTHER

## 2019-06-27 RX ORDER — EZETIMIBE 10 MG/1
10 TABLET ORAL DAILY
Qty: 90 TABLET | Refills: 1 | Status: SHIPPED | OUTPATIENT
Start: 2019-06-27 | End: 2019-12-30 | Stop reason: SDUPTHER

## 2019-06-27 RX ORDER — NYSTATIN 100000 [USP'U]/G
POWDER TOPICAL 3 TIMES DAILY
Qty: 15 G | Refills: 3 | Status: SHIPPED | OUTPATIENT
Start: 2019-06-27 | End: 2019-12-30 | Stop reason: SDUPTHER

## 2019-09-25 ENCOUNTER — APPOINTMENT (OUTPATIENT)
Dept: LAB | Age: 69
End: 2019-09-25
Payer: MEDICARE

## 2019-09-25 DIAGNOSIS — R73.01 IMPAIRED FASTING GLUCOSE: ICD-10-CM

## 2019-09-25 LAB
EST. AVERAGE GLUCOSE BLD GHB EST-MCNC: 131 MG/DL
GLUCOSE P FAST SERPL-MCNC: 106 MG/DL (ref 65–99)
HBA1C MFR BLD: 6.2 % (ref 4.2–6.3)

## 2019-09-25 PROCEDURE — 82947 ASSAY GLUCOSE BLOOD QUANT: CPT

## 2019-09-25 PROCEDURE — 36415 COLL VENOUS BLD VENIPUNCTURE: CPT

## 2019-09-25 PROCEDURE — 83036 HEMOGLOBIN GLYCOSYLATED A1C: CPT

## 2019-09-30 ENCOUNTER — OFFICE VISIT (OUTPATIENT)
Dept: INTERNAL MEDICINE CLINIC | Age: 69
End: 2019-09-30
Payer: MEDICARE

## 2019-09-30 VITALS
HEART RATE: 64 BPM | HEIGHT: 61 IN | SYSTOLIC BLOOD PRESSURE: 112 MMHG | TEMPERATURE: 98.6 F | BODY MASS INDEX: 38.18 KG/M2 | OXYGEN SATURATION: 97 % | WEIGHT: 202.2 LBS | DIASTOLIC BLOOD PRESSURE: 70 MMHG

## 2019-09-30 DIAGNOSIS — Z12.39 SCREENING FOR BREAST CANCER: ICD-10-CM

## 2019-09-30 DIAGNOSIS — Z96.652 STATUS POST TOTAL LEFT KNEE REPLACEMENT: ICD-10-CM

## 2019-09-30 DIAGNOSIS — R73.01 IMPAIRED FASTING GLUCOSE: ICD-10-CM

## 2019-09-30 DIAGNOSIS — I10 BENIGN ESSENTIAL HYPERTENSION: ICD-10-CM

## 2019-09-30 DIAGNOSIS — Z23 NEED FOR INFLUENZA VACCINATION: ICD-10-CM

## 2019-09-30 DIAGNOSIS — Z11.59 NEED FOR HEPATITIS C SCREENING TEST: ICD-10-CM

## 2019-09-30 DIAGNOSIS — Z12.11 SCREENING FOR COLON CANCER: Primary | ICD-10-CM

## 2019-09-30 PROCEDURE — 99214 OFFICE O/P EST MOD 30 MIN: CPT | Performed by: INTERNAL MEDICINE

## 2019-09-30 NOTE — ASSESSMENT & PLAN NOTE
Depression and anxiety is stable she is taking medication patient cannot sleep without her Ambien but otherwise she is stable

## 2019-09-30 NOTE — PROGRESS NOTES
Assessment/Plan:  BMI Counseling: Body mass index is 37 93 kg/m²  The BMI is above normal  Nutrition recommendations include reducing portion sizes, decreasing overall calorie intake, 3-5 servings of fruits/vegetables daily, reducing fast food intake, consuming healthier snacks, decreasing soda and/or juice intake and moderation in carbohydrate intake  Exercise recommendations include exercising 3-5 times per week  Status post total left knee replacement  Patient is status post total knee replacement but still having some problems with the knee recently was seen by the Orthopedics and they recommend physical therapy and strengthening exercises but she is not really happy with her progression after the total knee replacement    Impaired fasting glucose  Fasting blood sugar and hemoglobin A1c are appropriate for her I recommend highly that she should keep an eye on her diet and weight loss that will prevent the diabetes for long period of time will continue to check her fasting blood sugar and hemoglobin A1c every 6 month to see if there is any intervention needed at this time there is no medication intervention needed    Benign essential hypertension  Hypertension is very well controlled continue with the same medication no changes    Depression  Depression and anxiety is stable she is taking medication patient cannot sleep without her Ambien but otherwise she is stable       Diagnoses and all orders for this visit:    Screening for colon cancer  -     Cologuard; Future    Need for hepatitis C screening test  -     Hepatitis C antibody; Future    Need for influenza vaccination    Screening for breast cancer  -     Mammo screening bilateral w cad; Future    Impaired fasting glucose  -     Comprehensive metabolic panel; Future    Benign essential hypertension  -     CBC and differential; Future  -     Comprehensive metabolic panel; Future  -     Lipid panel;  Future  -     TSH, 3rd generation with Free T4 reflex; Future  -     UA w Reflex to Microscopic w Reflex to Culture    Status post total left knee replacement    Other orders  -     Cancel: Ambulatory referral to Gastroenterology; Future  -     Cancel: influenza vaccine, [id number], high-dose, PF 0 5 mL (FLUZONE HIGH-DOSE)        Subjective:   Chief Complaint   Patient presents with    Follow-up     anxiety, insomnia, HTN, review labs 9/25/19    Health Maintenance     Hep C, AWV, CRC, BMI: f/u, Tdap, flu vaccine- declined        Patient ID: Reyes Reichert is a 78 y o  female  HPI  This is a very pleasant 78 years young lady who is here today for the regular follow-up of her medical condition except for the bilateral knee pain and the pain in the replaced knee is the only complaint she have  She is followed up by the Orthopedics she denying any other problems no chest pain or shortness of breath depression is stable hypertension is controlled she did gain some weight and I went over diet and exercise for her and see if she can lose some weight her fasting blood sugar and hemoglobin A1c were appropriate she has impaired fasting blood sugar we will not add any medication for that right now unless the things get worst she is planning to do some dietary control on her  The following portions of the patient's history were reviewed and updated as appropriate: allergies, current medications, past family history, past medical history, past social history, past surgical history and problem list     Review of Systems   Constitutional: Negative for chills and fatigue  HENT: Negative for congestion, ear pain, hearing loss, postnasal drip, sinus pressure, sore throat and voice change  Eyes: Negative for pain, discharge and visual disturbance  Respiratory: Negative for cough, chest tightness and shortness of breath  Cardiovascular: Negative for chest pain, palpitations and leg swelling     Gastrointestinal: Negative for abdominal pain, blood in stool, diarrhea, nausea and rectal pain  Genitourinary: Negative for difficulty urinating, dysuria and urgency  Musculoskeletal: Negative for arthralgias and joint swelling  Bilateral knee pain   Skin: Negative for rash  Allergic/Immunologic: Negative for environmental allergies and food allergies  Neurological: Negative for dizziness, tremors, weakness, numbness and headaches  Hematological: Negative for adenopathy  Psychiatric/Behavioral: Negative for behavioral problems and hallucinations           Past Medical History:   Diagnosis Date    Anxiety     last assessed 02/18/14    Endometriosis     Generalized osteoarthritis of multiple sites     last assessed 02/18/14    Hyperlipidemia     Hypertension     Left knee pain     Major depression, single episode     mental status change    Pelvic adhesions     Squamous cell skin cancer     resolved 09/2007; back         Current Outpatient Medications:     acyclovir (ZOVIRAX) 5 % ointment, Apply topically 3 (three) times a day as needed (Herpes labialis), Disp: 15 g, Rfl: 0    escitalopram (LEXAPRO) 10 mg tablet, Take 1 tablet (10 mg total) by mouth daily, Disp: 90 tablet, Rfl: 1    ezetimibe (ZETIA) 10 mg tablet, Take 1 tablet (10 mg total) by mouth daily, Disp: 90 tablet, Rfl: 1    LORazepam (ATIVAN) 1 mg tablet, Take 0 5 tablets (0 5 mg total) by mouth daily as needed for anxiety, Disp: 30 tablet, Rfl: 5    nadolol (CORGARD) 40 mg tablet, Take 1 tablet (40 mg total) by mouth daily, Disp: 90 tablet, Rfl: 1    nystatin (MYCOSTATIN) powder, Apply topically 3 (three) times a day, Disp: 15 g, Rfl: 3    PREVIDENT 5000 BOOSTER PLUS 1 1 % PSTE, BRUSH THOROUGHLY, 1-2 TIMES DAILY FOR 2 MINUTES, DO NOT RINSE, DO NOT EAT OR DRINK 30 MIN AFTER, Disp: , Rfl: 3    simvastatin (ZOCOR) 40 mg tablet, Take 1 tablet (40 mg total) by mouth daily, Disp: 90 tablet, Rfl: 1    traMADol (ULTRAM) 50 mg tablet, Take 1 tablet (50 mg total) by mouth 2 (two) times a day as needed for moderate pain, Disp: 60 tablet, Rfl: 0    zolpidem (AMBIEN) 10 mg tablet, Take 1 tablet (10 mg total) by mouth daily at bedtime as needed (prn), Disp: 30 tablet, Rfl: 5    Allergies   Allergen Reactions    Aspirin GI Intolerance    Codeine Nausea Only, GI Intolerance and Vomiting    Oxycodone-Acetaminophen Vomiting     darvocet       Social History   Past Surgical History:   Procedure Laterality Date    APPENDECTOMY      BREAST BIOPSY Left 07/01/2005    stereotactic; percutaneous needle core use imag guide    BREAST EXCISIONAL BIOPSY Left     CHOLECYSTECTOMY      PILONIDAL CYST EXCISION      TOTAL ABDOMINAL HYSTERECTOMY Bilateral 1994    age 38   [place] TOTAL ABDOMINAL HYSTERECTOMY W/ BILATERAL SALPINGOOPHORECTOMY Bilateral 1994    age 38     Family History   Problem Relation Age of Onset    Breast cancer Mother 61        lumpectomy    Hypertension Mother     Thyroid disease Mother     Lung cancer Father     Lung cancer Paternal [de-identified]     Esophageal cancer Paternal Aunt     Kidney nephrosis Son     Hypertension Son     No Known Problems Maternal Grandmother     No Known Problems Maternal Grandfather     Dementia Paternal Grandmother     Leukemia Paternal Grandfather     Breast cancer Maternal Aunt 70       Objective:  /70 (BP Location: Left arm, Patient Position: Sitting, Cuff Size: Large)   Pulse 64   Temp 98 6 °F (37 °C) (Tympanic)   Ht 5' 1 22" (1 555 m)   Wt 91 7 kg (202 lb 3 2 oz)   SpO2 97%   BMI 37 93 kg/m²        Physical Exam   Constitutional: She is oriented to person, place, and time  She appears well-developed and well-nourished  HENT:   Right Ear: External ear normal    Mouth/Throat: Oropharynx is clear and moist    Eyes: Pupils are equal, round, and reactive to light  Conjunctivae and EOM are normal    Neck: Normal range of motion  No JVD present  No thyromegaly present  Cardiovascular: Normal rate, regular rhythm, normal heart sounds and intact distal pulses  Pulmonary/Chest: Breath sounds normal    Abdominal: Soft  Bowel sounds are normal    Musculoskeletal: Normal range of motion  Lymphadenopathy:     She has no cervical adenopathy  Neurological: She is alert and oriented to person, place, and time  She has normal reflexes  Skin: Skin is dry  Psychiatric: She has a normal mood and affect   Her behavior is normal  Judgment and thought content normal          Recent Results (from the past 672 hour(s))   Glucose, fasting    Collection Time: 09/25/19  9:43 AM   Result Value Ref Range    Glucose, Fasting 106 (H) 65 - 99 mg/dL   Hemoglobin A1C    Collection Time: 09/25/19  9:43 AM   Result Value Ref Range    Hemoglobin A1C 6 2 4 2 - 6 3 %     mg/dl

## 2019-09-30 NOTE — ASSESSMENT & PLAN NOTE
Fasting blood sugar and hemoglobin A1c are appropriate for her I recommend highly that she should keep an eye on her diet and weight loss that will prevent the diabetes for long period of time will continue to check her fasting blood sugar and hemoglobin A1c every 6 month to see if there is any intervention needed at this time there is no medication intervention needed

## 2019-09-30 NOTE — ASSESSMENT & PLAN NOTE
Patient is status post total knee replacement but still having some problems with the knee recently was seen by the Orthopedics and they recommend physical therapy and strengthening exercises but she is not really happy with her progression after the total knee replacement

## 2019-12-18 ENCOUNTER — APPOINTMENT (OUTPATIENT)
Dept: LAB | Age: 69
End: 2019-12-18
Payer: MEDICARE

## 2019-12-18 DIAGNOSIS — R73.01 IMPAIRED FASTING GLUCOSE: ICD-10-CM

## 2019-12-18 DIAGNOSIS — I10 BENIGN ESSENTIAL HYPERTENSION: ICD-10-CM

## 2019-12-18 DIAGNOSIS — Z11.59 NEED FOR HEPATITIS C SCREENING TEST: ICD-10-CM

## 2019-12-18 LAB
ALBUMIN SERPL BCP-MCNC: 3.1 G/DL (ref 3.5–5)
ALP SERPL-CCNC: 109 U/L (ref 46–116)
ALT SERPL W P-5'-P-CCNC: 22 U/L (ref 12–78)
ANION GAP SERPL CALCULATED.3IONS-SCNC: 5 MMOL/L (ref 4–13)
AST SERPL W P-5'-P-CCNC: 22 U/L (ref 5–45)
BACTERIA UR QL AUTO: ABNORMAL /HPF
BASOPHILS # BLD AUTO: 0.04 THOUSANDS/ΜL (ref 0–0.1)
BASOPHILS NFR BLD AUTO: 1 % (ref 0–1)
BILIRUB SERPL-MCNC: 0.68 MG/DL (ref 0.2–1)
BILIRUB UR QL STRIP: NEGATIVE
BUN SERPL-MCNC: 7 MG/DL (ref 5–25)
CALCIUM SERPL-MCNC: 8.6 MG/DL (ref 8.3–10.1)
CHLORIDE SERPL-SCNC: 108 MMOL/L (ref 100–108)
CHOLEST SERPL-MCNC: 127 MG/DL (ref 50–200)
CLARITY UR: ABNORMAL
CO2 SERPL-SCNC: 28 MMOL/L (ref 21–32)
COLOR UR: ABNORMAL
CREAT SERPL-MCNC: 0.72 MG/DL (ref 0.6–1.3)
EOSINOPHIL # BLD AUTO: 0.08 THOUSAND/ΜL (ref 0–0.61)
EOSINOPHIL NFR BLD AUTO: 1 % (ref 0–6)
ERYTHROCYTE [DISTWIDTH] IN BLOOD BY AUTOMATED COUNT: 13 % (ref 11.6–15.1)
GFR SERPL CREATININE-BSD FRML MDRD: 85 ML/MIN/1.73SQ M
GLUCOSE P FAST SERPL-MCNC: 105 MG/DL (ref 65–99)
GLUCOSE UR STRIP-MCNC: NEGATIVE MG/DL
HCT VFR BLD AUTO: 43.7 % (ref 34.8–46.1)
HCV AB SER QL: NORMAL
HDLC SERPL-MCNC: 55 MG/DL
HGB BLD-MCNC: 13.9 G/DL (ref 11.5–15.4)
HGB UR QL STRIP.AUTO: NEGATIVE
HYALINE CASTS #/AREA URNS LPF: ABNORMAL /LPF
IMM GRANULOCYTES # BLD AUTO: 0.03 THOUSAND/UL (ref 0–0.2)
IMM GRANULOCYTES NFR BLD AUTO: 1 % (ref 0–2)
KETONES UR STRIP-MCNC: NEGATIVE MG/DL
LDLC SERPL CALC-MCNC: 52 MG/DL (ref 0–100)
LEUKOCYTE ESTERASE UR QL STRIP: ABNORMAL
LYMPHOCYTES # BLD AUTO: 2.19 THOUSANDS/ΜL (ref 0.6–4.47)
LYMPHOCYTES NFR BLD AUTO: 35 % (ref 14–44)
MCH RBC QN AUTO: 28.7 PG (ref 26.8–34.3)
MCHC RBC AUTO-ENTMCNC: 31.8 G/DL (ref 31.4–37.4)
MCV RBC AUTO: 90 FL (ref 82–98)
MONOCYTES # BLD AUTO: 0.35 THOUSAND/ΜL (ref 0.17–1.22)
MONOCYTES NFR BLD AUTO: 6 % (ref 4–12)
MUCOUS THREADS UR QL AUTO: ABNORMAL
NEUTROPHILS # BLD AUTO: 3.49 THOUSANDS/ΜL (ref 1.85–7.62)
NEUTS SEG NFR BLD AUTO: 56 % (ref 43–75)
NITRITE UR QL STRIP: NEGATIVE
NON-SQ EPI CELLS URNS QL MICRO: ABNORMAL /HPF
NONHDLC SERPL-MCNC: 72 MG/DL
NRBC BLD AUTO-RTO: 0 /100 WBCS
PH UR STRIP.AUTO: 5.5 [PH]
PLATELET # BLD AUTO: 146 THOUSANDS/UL (ref 149–390)
PMV BLD AUTO: 10.5 FL (ref 8.9–12.7)
POTASSIUM SERPL-SCNC: 3.7 MMOL/L (ref 3.5–5.3)
PROT SERPL-MCNC: 6.9 G/DL (ref 6.4–8.2)
PROT UR STRIP-MCNC: NEGATIVE MG/DL
RBC # BLD AUTO: 4.84 MILLION/UL (ref 3.81–5.12)
RBC #/AREA URNS AUTO: ABNORMAL /HPF
SODIUM SERPL-SCNC: 141 MMOL/L (ref 136–145)
SP GR UR STRIP.AUTO: 1.02 (ref 1–1.03)
TRIGL SERPL-MCNC: 102 MG/DL
TSH SERPL DL<=0.05 MIU/L-ACNC: 3.59 UIU/ML (ref 0.36–3.74)
UROBILINOGEN UR QL STRIP.AUTO: 0.2 E.U./DL
WBC # BLD AUTO: 6.18 THOUSAND/UL (ref 4.31–10.16)
WBC #/AREA URNS AUTO: ABNORMAL /HPF

## 2019-12-18 PROCEDURE — 80053 COMPREHEN METABOLIC PANEL: CPT

## 2019-12-18 PROCEDURE — 84443 ASSAY THYROID STIM HORMONE: CPT

## 2019-12-18 PROCEDURE — 85025 COMPLETE CBC W/AUTO DIFF WBC: CPT

## 2019-12-18 PROCEDURE — 81001 URINALYSIS AUTO W/SCOPE: CPT | Performed by: INTERNAL MEDICINE

## 2019-12-18 PROCEDURE — 80061 LIPID PANEL: CPT

## 2019-12-18 PROCEDURE — 36415 COLL VENOUS BLD VENIPUNCTURE: CPT

## 2019-12-18 PROCEDURE — 86803 HEPATITIS C AB TEST: CPT

## 2019-12-30 ENCOUNTER — OFFICE VISIT (OUTPATIENT)
Dept: INTERNAL MEDICINE CLINIC | Age: 69
End: 2019-12-30
Payer: MEDICARE

## 2019-12-30 VITALS
HEART RATE: 66 BPM | OXYGEN SATURATION: 96 % | TEMPERATURE: 98.1 F | SYSTOLIC BLOOD PRESSURE: 118 MMHG | DIASTOLIC BLOOD PRESSURE: 62 MMHG | HEIGHT: 61 IN | WEIGHT: 199.4 LBS | BODY MASS INDEX: 37.65 KG/M2

## 2019-12-30 DIAGNOSIS — E78.00 HYPERCHOLESTEREMIA: ICD-10-CM

## 2019-12-30 DIAGNOSIS — M25.562 PAIN IN BOTH KNEES, UNSPECIFIED CHRONICITY: ICD-10-CM

## 2019-12-30 DIAGNOSIS — Z13.6 ENCOUNTER FOR ABDOMINAL AORTIC ANEURYSM (AAA) SCREENING: ICD-10-CM

## 2019-12-30 DIAGNOSIS — F41.9 ANXIETY: ICD-10-CM

## 2019-12-30 DIAGNOSIS — E78.00 HYPERCHOLESTEROLEMIA: ICD-10-CM

## 2019-12-30 DIAGNOSIS — B00.1 COLD SORE: ICD-10-CM

## 2019-12-30 DIAGNOSIS — Z12.11 SPECIAL SCREENING FOR MALIGNANT NEOPLASM OF COLON: Primary | ICD-10-CM

## 2019-12-30 DIAGNOSIS — R73.01 IMPAIRED FASTING GLUCOSE: ICD-10-CM

## 2019-12-30 DIAGNOSIS — I10 HYPERTENSION, UNSPECIFIED TYPE: ICD-10-CM

## 2019-12-30 DIAGNOSIS — M25.561 PAIN IN BOTH KNEES, UNSPECIFIED CHRONICITY: ICD-10-CM

## 2019-12-30 DIAGNOSIS — G47.00 INSOMNIA, UNSPECIFIED TYPE: ICD-10-CM

## 2019-12-30 DIAGNOSIS — I10 BENIGN ESSENTIAL HYPERTENSION: ICD-10-CM

## 2019-12-30 DIAGNOSIS — L30.4 INTERTRIGO: ICD-10-CM

## 2019-12-30 DIAGNOSIS — Z13.820 ENCOUNTER FOR SCREENING FOR OSTEOPOROSIS: ICD-10-CM

## 2019-12-30 PROCEDURE — G0439 PPPS, SUBSEQ VISIT: HCPCS | Performed by: INTERNAL MEDICINE

## 2019-12-30 PROCEDURE — 99214 OFFICE O/P EST MOD 30 MIN: CPT | Performed by: INTERNAL MEDICINE

## 2019-12-30 RX ORDER — TRAMADOL HYDROCHLORIDE 50 MG/1
50 TABLET ORAL 2 TIMES DAILY PRN
Qty: 60 TABLET | Refills: 0 | Status: SHIPPED | OUTPATIENT
Start: 2019-12-30 | End: 2020-12-14

## 2019-12-30 RX ORDER — ZOLPIDEM TARTRATE 10 MG/1
10 TABLET ORAL
Qty: 30 TABLET | Refills: 5 | Status: SHIPPED | OUTPATIENT
Start: 2019-12-30 | End: 2019-12-30

## 2019-12-30 RX ORDER — ESCITALOPRAM OXALATE 10 MG/1
10 TABLET ORAL DAILY
Qty: 90 TABLET | Refills: 1 | Status: SHIPPED | OUTPATIENT
Start: 2019-12-30 | End: 2020-06-10 | Stop reason: SDUPTHER

## 2019-12-30 RX ORDER — ZOLPIDEM TARTRATE 10 MG/1
10 TABLET ORAL
Qty: 30 TABLET | Refills: 5 | Status: SHIPPED | OUTPATIENT
Start: 2019-12-30 | End: 2019-12-30 | Stop reason: SDUPTHER

## 2019-12-30 RX ORDER — LORAZEPAM 1 MG/1
0.5 TABLET ORAL DAILY PRN
Qty: 30 TABLET | Refills: 5 | Status: SHIPPED | OUTPATIENT
Start: 2019-12-30 | End: 2020-06-10 | Stop reason: SDUPTHER

## 2019-12-30 RX ORDER — ACYCLOVIR 50 MG/G
OINTMENT TOPICAL 3 TIMES DAILY PRN
Qty: 15 G | Refills: 0 | Status: SHIPPED | OUTPATIENT
Start: 2019-12-30 | End: 2021-08-25

## 2019-12-30 RX ORDER — SIMVASTATIN 40 MG
40 TABLET ORAL DAILY
Qty: 90 TABLET | Refills: 1 | Status: SHIPPED | OUTPATIENT
Start: 2019-12-30 | End: 2020-06-10 | Stop reason: SDUPTHER

## 2019-12-30 RX ORDER — NYSTATIN 100000 [USP'U]/G
POWDER TOPICAL 3 TIMES DAILY
Qty: 15 G | Refills: 3 | Status: SHIPPED | OUTPATIENT
Start: 2019-12-30 | End: 2020-10-19 | Stop reason: SDUPTHER

## 2019-12-30 RX ORDER — TRAMADOL HYDROCHLORIDE 50 MG/1
50 TABLET ORAL 2 TIMES DAILY PRN
Qty: 60 TABLET | Refills: 0 | Status: SHIPPED | OUTPATIENT
Start: 2019-12-30 | End: 2019-12-30

## 2019-12-30 RX ORDER — EZETIMIBE 10 MG/1
10 TABLET ORAL DAILY
Qty: 90 TABLET | Refills: 1 | Status: SHIPPED | OUTPATIENT
Start: 2019-12-30 | End: 2020-06-10 | Stop reason: SDUPTHER

## 2019-12-30 RX ORDER — ZOLPIDEM TARTRATE 10 MG/1
10 TABLET ORAL
Qty: 30 TABLET | Refills: 5 | Status: SHIPPED | OUTPATIENT
Start: 2019-12-30 | End: 2020-06-10 | Stop reason: SDUPTHER

## 2019-12-30 RX ORDER — LORAZEPAM 1 MG/1
0.5 TABLET ORAL DAILY PRN
Qty: 30 TABLET | Refills: 5 | Status: SHIPPED | OUTPATIENT
Start: 2019-12-30 | End: 2019-12-30

## 2019-12-30 RX ORDER — NADOLOL 40 MG/1
40 TABLET ORAL DAILY
Qty: 90 TABLET | Refills: 1 | Status: SHIPPED | OUTPATIENT
Start: 2019-12-30 | End: 2020-06-10 | Stop reason: SDUPTHER

## 2019-12-30 NOTE — TELEPHONE ENCOUNTER
Pt requested Ambien be sent to different pharmacy     I called [facility] and canceled already     Please send to new pharmacy

## 2019-12-30 NOTE — PATIENT INSTRUCTIONS
Medicare Preventive Visit Patient Instructions  Thank you for completing your Welcome to Medicare Visit or Medicare Annual Wellness Visit today  Your next wellness visit will be due in one year (12/30/2020)  The screening/preventive services that you may require over the next 5-10 years are detailed below  Some tests may not apply to you based off risk factors and/or age  Screening tests ordered at today's visit but not completed yet may show as past due  Also, please note that scanned in results may not display below  Preventive Screenings:  Service Recommendations Previous Testing/Comments   Colorectal Cancer Screening  * Colonoscopy    * Fecal Occult Blood Test (FOBT)/Fecal Immunochemical Test (FIT)  * Fecal DNA/Cologuard Test  * Flexible Sigmoidoscopy Age: 54-65 years old   Colonoscopy: every 10 years (may be performed more frequently if at higher risk)  OR  FOBT/FIT: every 1 year  OR  Cologuard: every 3 years  OR  Sigmoidoscopy: every 5 years  Screening may be recommended earlier than age 48 if at higher risk for colorectal cancer  Also, an individualized decision between you and your healthcare provider will decide whether screening between the ages of 74-80 would be appropriate  Colonoscopy: Not on file  FOBT/FIT: Not on file  Cologuard: Not on file  Sigmoidoscopy: Not on file         Breast Cancer Screening Age: 36 years old  Frequency: every 1-2 years  Not required if history of left and right mastectomy Mammogram: 04/04/2019       Cervical Cancer Screening Between the ages of 21-29, pap smear recommended once every 3 years  Between the ages of 33-67, can perform pap smear with HPV co-testing every 5 years     Recommendations may differ for women with a history of total hysterectomy, cervical cancer, or abnormal pap smears in past  Pap Smear: 01/18/2019       Hepatitis C Screening Once for adults born between 1945 and 1965  More frequently in patients at high risk for Hepatitis C Hep C Antibody: 12/18/2019       Diabetes Screening 1-2 times per year if you're at risk for diabetes or have pre-diabetes Fasting glucose: 105 mg/dL   A1C: 6 2 %       Cholesterol Screening Once every 5 years if you don't have a lipid disorder  May order more often based on risk factors  Lipid panel: 12/18/2019         Other Preventive Screenings Covered by Medicare:  1  Abdominal Aortic Aneurysm (AAA) Screening: covered once if your at risk  You're considered to be at risk if you have a family history of AAA  2  Lung Cancer Screening: covers low dose CT scan once per year if you meet all of the following conditions: (1) Age 50-69; (2) No signs or symptoms of lung cancer; (3) Current smoker or have quit smoking within the last 15 years; (4) You have a tobacco smoking history of at least 30 pack years (packs per day multiplied by number of years you smoked); (5) You get a written order from a healthcare provider  3  Glaucoma Screening: covered annually if you're considered high risk: (1) You have diabetes OR (2) Family history of glaucoma OR (3)  aged 48 and older OR (3)  American aged 72 and older  3  Osteoporosis Screening: covered every 2 years if you meet one of the following conditions: (1) You're estrogen deficient and at risk for osteoporosis based off medical history and other findings; (2) Have a vertebral abnormality; (3) On glucocorticoid therapy for more than 3 months; (4) Have primary hyperparathyroidism; (5) On osteoporosis medications and need to assess response to drug therapy  · Last bone density test (DXA Scan): 11/04/2010   5  HIV Screening: covered annually if you're between the age of 15-65  Also covered annually if you are younger than 13 and older than 72 with risk factors for HIV infection  For pregnant patients, it is covered up to 3 times per pregnancy      Immunizations:  Immunization Recommendations   Influenza Vaccine Annual influenza vaccination during flu season is recommended for all persons aged >= 6 months who do not have contraindications   Pneumococcal Vaccine (Prevnar and Pneumovax)  * Prevnar = PCV13  * Pneumovax = PPSV23   Adults 25-60 years old: 1-3 doses may be recommended based on certain risk factors  Adults 72 years old: Prevnar (PCV13) vaccine recommended followed by Pneumovax (PPSV23) vaccine  If already received PPSV23 since turning 65, then PCV13 recommended at least one year after PPSV23 dose  Hepatitis B Vaccine 3 dose series if at intermediate or high risk (ex: diabetes, end stage renal disease, liver disease)   Tetanus (Td) Vaccine - COST NOT COVERED BY MEDICARE PART B Following completion of primary series, a booster dose should be given every 10 years to maintain immunity against tetanus  Td may also be given as tetanus wound prophylaxis  Tdap Vaccine - COST NOT COVERED BY MEDICARE PART B Recommended at least once for all adults  For pregnant patients, recommended with each pregnancy  Shingles Vaccine (Shingrix) - COST NOT COVERED BY MEDICARE PART B  2 shot series recommended in those aged [address] and above     Health Maintenance Due:      Topic Date Due    CRC Screening: Colonoscopy  1950    MAMMOGRAM  04/04/2020    Hepatitis C Screening  Completed     Immunizations Due:      Topic Date Due    DTaP,Tdap,and Td Vaccines (1 - Tdap) 02/27/1960     Advance Directives   What are advance directives? Advance directives are legal documents that state your wishes and plans for medical care  These plans are made ahead of time in case you lose your ability to make decisions for yourself  Advance directives can apply to any medical decision, such as the treatments you want, and if you want to donate organs  What are the types of advance directives? There are many types of advance directives, and each state has rules about how to use them  You may choose a combination of any of the following:  · Living will:   This is a written record of the treatment you want  You can also choose which treatments you do not want, which to limit, and which to stop at a certain time  This includes surgery, medicine, IV fluid, and tube feedings  · Durable power of  for healthcare [facility]): This is a written record that states who you want to make healthcare choices for you when you are unable to make them for yourself  This person, called a proxy, is usually a family member or a friend  You may choose more than 1 proxy  · Do not resuscitate (DNR) order:  A DNR order is used in case your heart stops beating or you stop breathing  It is a request not to have certain forms of treatment, such as CPR  A DNR order may be included in other types of advance directives  · Medical directive: This covers the care that you want if you are in a coma, near death, or unable to make decisions for yourself  You can list the treatments you want for each condition  Treatment may include pain medicine, surgery, blood transfusions, dialysis, IV or tube feedings, and a ventilator (breathing machine)  · Values history: This document has questions about your views, beliefs, and how you feel and think about life  This information can help others choose the care that you would choose  Why are advance directives important? An advance directive helps you control your care  Although spoken wishes may be used, it is better to have your wishes written down  Spoken wishes can be misunderstood, or not followed  Treatments may be given even if you do not want them  An advance directive may make it easier for your family to make difficult choices about your care  Weight Management   Why it is important to manage your weight:  Being overweight increases your risk of health conditions such as heart disease, high blood pressure, type 2 diabetes, and certain types of cancer  It can also increase your risk for osteoarthritis, sleep apnea, and other respiratory problems   Aim for a slow, steady weight loss  Even a small amount of weight loss can lower your risk of health problems  How to lose weight safely:  A safe and healthy way to lose weight is to eat fewer calories and get regular exercise  You can lose up about 1 pound a week by decreasing the number of calories you eat by 500 calories each day  Healthy meal plan for weight management:  A healthy meal plan includes a variety of foods, contains fewer calories, and helps you stay healthy  A healthy meal plan includes the following:  · Eat whole-grain foods more often  A healthy meal plan should contain fiber  Fiber is the part of grains, fruits, and vegetables that is not broken down by your body  Whole-grain foods are healthy and provide extra fiber in your diet  Some examples of whole-grain foods are whole-wheat breads and pastas, oatmeal, brown rice, and bulgur  · Eat a variety of vegetables every day  Include dark, leafy greens such as spinach, kale, damien greens, and mustard greens  Eat yellow and orange vegetables such as carrots, sweet potatoes, and winter squash  · Eat a variety of fruits every day  Choose fresh or canned fruit (canned in its own juice or light syrup) instead of juice  Fruit juice has very little or no fiber  · Eat low-fat dairy foods  Drink fat-free (skim) milk or 1% milk  Eat fat-free yogurt and low-fat cottage cheese  Try low-fat cheeses such as mozzarella and other reduced-fat cheeses  · Choose meat and other protein foods that are low in fat  Choose beans or other legumes such as split peas or lentils  Choose fish, skinless poultry (chicken or turkey), or lean cuts of red meat (beef or pork)  Before you cook meat or poultry, cut off any visible fat  · Use less fat and oil  Try baking foods instead of frying them  Add less fat, such as margarine, sour cream, regular salad dressing and mayonnaise to foods  Eat fewer high-fat foods   Some examples of high-fat foods include french fries, doughnuts, ice cream, and cakes   · Eat fewer sweets  Limit foods and drinks that are high in sugar  This includes candy, cookies, regular soda, and sweetened drinks  Exercise:  Exercise at least 30 minutes per day on most days of the week  Some examples of exercise include walking, biking, dancing, and swimming  You can also fit in more physical activity by taking the stairs instead of the elevator or parking farther away from stores  Ask your healthcare provider about the best exercise plan for you  © Copyright [employer] 2018 Information is for End User's use only and may not be sold, redistributed or otherwise used for commercial purposes   All illustrations and images included in CareNotes® are the copyrighted property of A D A M [employer] Inc  or [employer]

## 2019-12-30 NOTE — PROGRESS NOTES
Assessment and Plan:     Problem List Items Addressed This Visit     None      Visit Diagnoses     Special screening for malignant neoplasm of colon    -  Primary           Preventive health issues were discussed with patient, and age appropriate screening tests were ordered as noted in patient's After Visit Summary  Personalized health advice and appropriate referrals for health education or preventive services given if needed, as noted in patient's After Visit Summary       History of Present Illness:     Patient presents for Medicare Annual Wellness visit    Patient Care Team:  Marah Montague MD as PCP - General     Problem List:     Patient Active Problem List   Diagnosis    Benign essential hypertension    Depression    Hypercholesterolemia    Impaired fasting glucose    Insomnia    Thrombocytopenia ([address] )    Hypocalcemia    Encounter for gynecological examination (general) (routine) without abnormal findings    Status post total left knee replacement      Past Medical and Surgical History:     Past Medical History:   Diagnosis Date    Anxiety     last assessed 02/18/14    Endometriosis     Generalized osteoarthritis of multiple sites     last assessed 02/18/14    Hyperlipidemia     Hypertension     Left knee pain     Major depression, single episode     mental status change    Pelvic adhesions     Squamous cell skin cancer     resolved 09/2007; back     Past Surgical History:   Procedure Laterality Date    APPENDECTOMY      BREAST BIOPSY Left 07/01/2005    stereotactic; percutaneous needle core use imag guide    BREAST EXCISIONAL BIOPSY Left     CHOLECYSTECTOMY      PILONIDAL CYST EXCISION      TOTAL ABDOMINAL HYSTERECTOMY Bilateral 1994    age 38   Christina Funes TOTAL ABDOMINAL HYSTERECTOMY W/ BILATERAL SALPINGOOPHORECTOMY Bilateral 1994    age 38      Family History:     Family History   Problem Relation Age of Onset   Christina Funes Breast cancer Mother 61        lumpectomy    Hypertension Mother    Christina Funes Thyroid disease Mother     Lung cancer Father     Lung cancer Paternal [de-identified]     Esophageal cancer Paternal Aunt     Kidney nephrosis Son     Hypertension Son     No Known Problems Maternal Grandmother     No Known Problems Maternal Grandfather     Dementia Paternal Grandmother     Leukemia Paternal Grandfather     Breast cancer Maternal Aunt 79      Social History:     Social History     Socioeconomic History    Marital status: /Civil Union     Spouse name: Not on file    Number of children: 2    Years of education: Not on file    Highest education level: Not on file   Occupational History    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   Social Needs    Financial resource strain: Not on file    Food insecurity:     Worry: Not on file     Inability: Not on file   [employer] needs:     Medical: Not on file     Non-medical: Not on file   Tobacco Use    Smoking status: Never Smoker    Smokeless tobacco: Never Used   Substance and Sexual Activity    Alcohol use: Yes     Comment: rarely consumes    Drug use: No    Sexual activity: Yes     Partners: Male   Lifestyle    Physical activity:     Days per week: Not on file     Minutes per session: Not on file    Stress: Not on file   Relationships    Social connections:     Talks on phone: Not on file     Gets together: Not on file     Attends Alevism service: Not on file     Active member of club or organization: Not on file     Attends meetings of clubs or organizations: Not on file     Relationship status: Not on file    Intimate partner violence:     Fear of current or ex partner: Not on file     Emotionally abused: Not on file     Physically abused: Not on file     Forced sexual activity: Not on file   Other Topics Concern    Not on file   Social History Narrative    Always uses seat belt    Caffeine use    Daily coffee consumption (2 cups/day)    Exercise habits: Walking 5x per week    Feels safe at home    Lives with spouse       Medications and Allergies:     Current Outpatient Medications   Medication Sig Dispense Refill    acyclovir (ZOVIRAX) 5 % ointment Apply topically 3 (three) times a day as needed (Herpes labialis) 15 g 0    escitalopram (LEXAPRO) 10 mg tablet Take 1 tablet (10 mg total) by mouth daily 90 tablet 1    ezetimibe (ZETIA) 10 mg tablet Take 1 tablet (10 mg total) by mouth daily 90 tablet 1    LORazepam (ATIVAN) 1 mg tablet Take 0 5 tablets (0 5 mg total) by mouth daily as needed for anxiety 30 tablet 5    nadolol (CORGARD) 40 mg tablet Take 1 tablet (40 mg total) by mouth daily 90 tablet 1    nystatin (MYCOSTATIN) powder Apply topically 3 (three) times a day 15 g 3    PREVIDENT 5000 BOOSTER PLUS 1 1 % PSTE BRUSH THOROUGHLY, 1-2 TIMES DAILY FOR 2 MINUTES, DO NOT RINSE, DO NOT EAT OR DRINK 30 MIN AFTER  3    simvastatin (ZOCOR) 40 mg tablet Take 1 tablet (40 mg total) by mouth daily 90 tablet 1    traMADol (ULTRAM) 50 mg tablet Take 1 tablet (50 mg total) by mouth 2 (two) times a day as needed for moderate pain 60 tablet 0    zolpidem (AMBIEN) 10 mg tablet Take 1 tablet (10 mg total) by mouth daily at bedtime as needed (prn) 30 tablet 5     No current facility-administered medications for this visit        Allergies   Allergen Reactions    Aspirin GI Intolerance    Codeine Nausea Only, GI Intolerance and Vomiting    Oxycodone-Acetaminophen Vomiting     darvocet      Immunizations:     Immunization History   Administered Date(s) Administered    H1N1, All Formulations 01/13/2010    INFLUENZA 11/28/2006, 10/19/2007, 09/28/2018    Influenza Split High Dose Preservative Free IM 09/29/2014, 10/26/2015, 10/08/2019    Influenza TIV (IM) 1950, 11/19/2012, 10/06/2016, 10/16/2017    Influenza, high dose seasonal 0 5 mL 10/08/2019    Pneumococcal Conjugate 13-Valent 09/28/2018    Pneumococcal Polysaccharide PPV23 06/13/2014      Health Maintenance:         Topic Date Due    CRC Screening: Colonoscopy  1950    MAMMOGRAM  04/04/2020    Hepatitis C Screening  Completed         Topic Date Due    DTaP,Tdap,and Td Vaccines (1 - Tdap) 02/27/1960      Medicare Health Risk Assessment:     There were no vitals taken for this visit  Cm Wolfe is here for her Initial Wellness visit  Last Medicare Wellness visit information reviewed, patient interviewed and updates made to the record today  Health Risk Assessment:   Patient rates overall health as very good  Patient feels that their physical health rating is slightly better  Eyesight was rated as same  Hearing was rated as same  Patient feels that their emotional and mental health rating is slightly better  Pain experienced in the last 7 days has been some  Patient's pain rating has been 5/10  Patient states that she has experienced no weight loss or gain in last 6 months  Depression Screening:   PHQ-2 Score: 0  PHQ-9 Score: 3      Fall Risk Screening: In the past year, patient has experienced: no history of falling in past year      Urinary Incontinence Screening:   Patient has leaked urine accidently in the last six months  Occasionally      Home Safety:  Patient does not have trouble with stairs inside or outside of their home  Patient has working smoke alarms and has working carbon monoxide detector  Home safety hazards include: none  Nutrition:   Current diet is Regular  Medications:   Patient is currently taking over-the-counter supplements  OTC medications include: see medication list  Patient is able to manage medications  Activities of Daily Living (ADLs)/Instrumental Activities of Daily Living (IADLs):   Walk and transfer into and out of bed and chair?: Yes  Dress and groom yourself?: Yes    Bathe or shower yourself?: Yes    Feed yourself?  Yes  Do your laundry/housekeeping?: Yes  Manage your money, pay your bills and track your expenses?: Yes  Make your own meals?: Yes    Do your own shopping?: Yes    Previous Hospitalizations:   Any hospitalizations or ED visits within the last 12 months?: Yes      Advance Care Planning:   Living will: Yes    Durable POA for healthcare: Yes    Advanced directive: Yes      Cognitive Screening:   Provider or family/friend/caregiver concerned regarding cognition?: No    PREVENTIVE SCREENINGS      Cardiovascular Screening:    General: Screening Not Indicated and History Lipid Disorder      Diabetes Screening:     General: Screening Current      Colorectal Cancer Screening:       Due for: Cologuard      Breast Cancer Screening:     General: Screening Current      Cervical Cancer Screening:    General: Screening Not Indicated      Osteoporosis Screening:      Due for: Bone Density Ultrasound      Abdominal Aortic Aneurysm (AAA) Screening:      Due for: Screening AAA Ultrasound      Hepatitis C Screening:    General: Screening Current    Other Counseling Topics:   Skin self-exam, sunscreen and calcium and vitamin D intake and regular weightbearing exercise         Serena Henriquez MD

## 2019-12-30 NOTE — PROGRESS NOTES
Assessment/Plan:     Diagnoses and all orders for this visit:    Special screening for malignant neoplasm of colon  -     Cologuard; Future    Pain in both knees, unspecified chronicity  -     Discontinue: traMADol (ULTRAM) 50 mg tablet; Take 1 tablet (50 mg total) by mouth 2 (two) times a day as needed for moderate pain  -     traMADol (ULTRAM) 50 mg tablet; Take 1 tablet (50 mg total) by mouth 2 (two) times a day as needed for moderate pain    Insomnia, unspecified type  -     Discontinue: zolpidem (AMBIEN) 10 mg tablet; Take 1 tablet (10 mg total) by mouth daily at bedtime as needed (prn)  -     zolpidem (AMBIEN) 10 mg tablet; Take 1 tablet (10 mg total) by mouth daily at bedtime as needed (prn)    Hypercholesteremia  -     simvastatin (ZOCOR) 40 mg tablet; Take 1 tablet (40 mg total) by mouth daily  -     ezetimibe (ZETIA) 10 mg tablet; Take 1 tablet (10 mg total) by mouth daily    Intertrigo  -     nystatin (MYCOSTATIN) powder; Apply topically 3 (three) times a day    Anxiety  -     Discontinue: LORazepam (ATIVAN) 1 mg tablet; Take 0 5 tablets (0 5 mg total) by mouth daily as needed for anxiety  -     escitalopram (LEXAPRO) 10 mg tablet; Take 1 tablet (10 mg total) by mouth daily  -     LORazepam (ATIVAN) 1 mg tablet; Take 0 5 tablets (0 5 mg total) by mouth daily as needed for anxiety    Cold sore  -     acyclovir (ZOVIRAX) 5 % ointment; Apply topically 3 (three) times a day as needed (Herpes labialis)    Hypertension, unspecified type  -     nadolol (CORGARD) 40 mg tablet; Take 1 tablet (40 mg total) by mouth daily    Encounter for abdominal aortic aneurysm (AAA) screening  -     US abdominal aorta; Future    Encounter for screening for osteoporosis  -     DXA bone density spine hip and pelvis; Future    Benign essential hypertension    Impaired fasting glucose  -     Glucose, fasting;  Future  -     Hemoglobin A1C; Future    Hypercholesterolemia             Subjective:   Chief Complaint   Patient presents with    Follow-up     depression, Paper scripts pended    Medicare Wellness Visit     initial AW    health maint     Due for cologuard? Patient ID: Jake Turner is a 78 y o  female  HPI  This is a very pleasant 78 years young lady who is here today for the regular follow-up and Medicare wellness subsequent visit she is doing well no new complaints except for the problems with the knee she is status post total knee replacement  Discussed about increase BMI diet exercise and weight loss is recommended  Hypertension is very well controlled continue with the same medication no changes  Hypercholesterolemia lipid panel is excellent no changes in the medications  Anxiety and depression and problem with insomnia are stable  Impaired fasting blood sugar fasting blood sugar was 105 last hemoglobin A1c was 6 2 I will repeat the fasting blood sugar and hemoglobin A1c in about 4 months patient will be followed up by me in 4 months     The following portions of the patient's history were reviewed and updated as appropriate: allergies, current medications, past family history, past medical history, past social history, past surgical history and problem list     Review of Systems   Constitutional: Positive for fatigue  Negative for chills  HENT: Negative for congestion, ear pain, hearing loss, postnasal drip, sinus pressure, sore throat and voice change  Eyes: Negative for pain, discharge and visual disturbance  Respiratory: Negative for cough, chest tightness and shortness of breath  Cardiovascular: Negative for chest pain, palpitations and leg swelling  Gastrointestinal: Negative for abdominal pain, blood in stool, diarrhea, nausea and rectal pain  Genitourinary: Negative for difficulty urinating, dysuria and urgency  Musculoskeletal: Negative for arthralgias and joint swelling  Knee problems   Skin: Negative for rash     Allergic/Immunologic: Negative for environmental allergies and food allergies  Neurological: Negative for dizziness, tremors, weakness, numbness and headaches  Hematological: Negative for adenopathy  Psychiatric/Behavioral: Negative for behavioral problems and hallucinations           Past Medical History:   Diagnosis Date    Anxiety     last assessed 02/18/14    Endometriosis     Generalized osteoarthritis of multiple sites     last assessed 02/18/14    Hyperlipidemia     Hypertension     Left knee pain     Major depression, single episode     mental status change    Pelvic adhesions     Squamous cell skin cancer     resolved 09/2007; back         Current Outpatient Medications:     acyclovir (ZOVIRAX) 5 % ointment, Apply topically 3 (three) times a day as needed (Herpes labialis), Disp: 15 g, Rfl: 0    escitalopram (LEXAPRO) 10 mg tablet, Take 1 tablet (10 mg total) by mouth daily, Disp: 90 tablet, Rfl: 1    ezetimibe (ZETIA) 10 mg tablet, Take 1 tablet (10 mg total) by mouth daily, Disp: 90 tablet, Rfl: 1    LORazepam (ATIVAN) 1 mg tablet, Take 0 5 tablets (0 5 mg total) by mouth daily as needed for anxiety, Disp: 30 tablet, Rfl: 5    nadolol (CORGARD) 40 mg tablet, Take 1 tablet (40 mg total) by mouth daily, Disp: 90 tablet, Rfl: 1    nystatin (MYCOSTATIN) powder, Apply topically 3 (three) times a day, Disp: 15 g, Rfl: 3    simvastatin (ZOCOR) 40 mg tablet, Take 1 tablet (40 mg total) by mouth daily, Disp: 90 tablet, Rfl: 1    traMADol (ULTRAM) 50 mg tablet, Take 1 tablet (50 mg total) by mouth 2 (two) times a day as needed for moderate pain, Disp: 60 tablet, Rfl: 0    zolpidem (AMBIEN) 10 mg tablet, Take 1 tablet (10 mg total) by mouth daily at bedtime as needed (prn), Disp: 30 tablet, Rfl: 5    Allergies   Allergen Reactions    Aspirin GI Intolerance    Codeine Nausea Only, GI Intolerance and Vomiting    Oxycodone-Acetaminophen Vomiting     darvocet       Social History   Past Surgical History:   Procedure Laterality Date    APPENDECTOMY      BREAST BIOPSY Left 07/01/2005    stereotactic; percutaneous needle core use imag guide    BREAST EXCISIONAL BIOPSY Left     CHOLECYSTECTOMY      PILONIDAL CYST EXCISION      TOTAL ABDOMINAL HYSTERECTOMY Bilateral 11    age 38   [facility] TOTAL ABDOMINAL HYSTERECTOMY W/ BILATERAL SALPINGOOPHORECTOMY Bilateral 1994    age 38     Family History   Problem Relation Age of Onset    Breast cancer Mother 61        lumpectomy    Hypertension Mother     Thyroid disease Mother     Lung cancer Father     Lung cancer Paternal [de-identified]     Esophageal cancer Paternal Aunt     Kidney nephrosis Son     Hypertension Son     No Known Problems Maternal Grandmother     No Known Problems Maternal Grandfather     Dementia Paternal Grandmother     Leukemia Paternal Grandfather     Breast cancer Maternal Aunt 70       Objective:  /62 (BP Location: Left arm, Patient Position: Sitting, Cuff Size: Standard)   Pulse 66   Temp 98 1 °F (36 7 °C) (Tympanic)   Ht 5' 1" (1 549 m)   Wt 90 4 kg (199 lb 6 4 oz)   SpO2 96%   BMI 37 68 kg/m²        Physical Exam   Constitutional: She is oriented to person, place, and time  She appears well-developed and well-nourished  HENT:   Right Ear: External ear normal    Mouth/Throat: Oropharynx is clear and moist    Eyes: Pupils are equal, round, and reactive to light  Conjunctivae and EOM are normal    Neck: Normal range of motion  No JVD present  No thyromegaly present  Cardiovascular: Normal rate, regular rhythm, normal heart sounds and intact distal pulses  Pulmonary/Chest: Breath sounds normal    Abdominal: Soft  Bowel sounds are normal    Musculoskeletal: Normal range of motion  Lymphadenopathy:     She has no cervical adenopathy  Neurological: She is alert and oriented to person, place, and time  She has normal reflexes  Skin: Skin is dry  Psychiatric: She has a normal mood and affect   Her behavior is normal  Judgment and thought content normal    Nursing note and vitals reviewed

## 2020-01-07 ENCOUNTER — TELEPHONE (OUTPATIENT)
Dept: INTERNAL MEDICINE CLINIC | Age: 70
End: 2020-01-07

## 2020-01-07 NOTE — TELEPHONE ENCOUNTER
Thania called from [facility] to inform office of how many more Ambien pt  was Prescribed  450 tablets were prescribed in 2019 starting 1/13/19  Pt  Was given 85 more tablets in 2019 because of early fill dates    Dr Live Gonzalez has filled Tramadol in the past 12/22/19 to [facility]

## 2020-01-07 NOTE — TELEPHONE ENCOUNTER
Checked PDMP website pt  Gets Tramadol filled by Dr Tani Edwards from [facility]    Per Dr Jose Raul Avina to cancel script at [facility]

## 2020-06-03 ENCOUNTER — HOSPITAL ENCOUNTER (OUTPATIENT)
Dept: RADIOLOGY | Age: 70
Discharge: HOME | End: 2020-06-03
Payer: MEDICARE

## 2020-06-03 DIAGNOSIS — Z12.31 ENCOUNTER FOR SCREENING MAMMOGRAM FOR MALIGNANT NEOPLASM OF BREAST: ICD-10-CM

## 2020-06-03 PROCEDURE — 77063 BREAST TOMOSYNTHESIS BI: CPT

## 2020-06-03 PROCEDURE — 77067 SCR MAMMO BI INCL CAD: CPT

## 2020-06-10 ENCOUNTER — OFFICE VISIT (OUTPATIENT)
Dept: INTERNAL MEDICINE CLINIC | Age: 70
End: 2020-06-10
Payer: MEDICARE

## 2020-06-10 VITALS
HEIGHT: 61 IN | HEART RATE: 62 BPM | BODY MASS INDEX: 38.06 KG/M2 | SYSTOLIC BLOOD PRESSURE: 124 MMHG | TEMPERATURE: 98.5 F | OXYGEN SATURATION: 98 % | DIASTOLIC BLOOD PRESSURE: 76 MMHG | WEIGHT: 201.6 LBS

## 2020-06-10 DIAGNOSIS — M54.9 UPPER BACK PAIN: ICD-10-CM

## 2020-06-10 DIAGNOSIS — I10 HYPERTENSION, UNSPECIFIED TYPE: ICD-10-CM

## 2020-06-10 DIAGNOSIS — D69.6 THROMBOCYTOPENIA (HCC): ICD-10-CM

## 2020-06-10 DIAGNOSIS — F41.9 ANXIETY: ICD-10-CM

## 2020-06-10 DIAGNOSIS — G47.00 INSOMNIA, UNSPECIFIED TYPE: ICD-10-CM

## 2020-06-10 DIAGNOSIS — L30.4 INTERTRIGO: ICD-10-CM

## 2020-06-10 DIAGNOSIS — F32.A DEPRESSION, UNSPECIFIED DEPRESSION TYPE: ICD-10-CM

## 2020-06-10 DIAGNOSIS — I10 BENIGN ESSENTIAL HYPERTENSION: Primary | ICD-10-CM

## 2020-06-10 DIAGNOSIS — E78.00 HYPERCHOLESTEREMIA: ICD-10-CM

## 2020-06-10 PROCEDURE — 1160F RVW MEDS BY RX/DR IN RCRD: CPT | Performed by: INTERNAL MEDICINE

## 2020-06-10 PROCEDURE — 1036F TOBACCO NON-USER: CPT | Performed by: INTERNAL MEDICINE

## 2020-06-10 PROCEDURE — 3078F DIAST BP <80 MM HG: CPT | Performed by: INTERNAL MEDICINE

## 2020-06-10 PROCEDURE — 3074F SYST BP LT 130 MM HG: CPT | Performed by: INTERNAL MEDICINE

## 2020-06-10 PROCEDURE — 99214 OFFICE O/P EST MOD 30 MIN: CPT | Performed by: INTERNAL MEDICINE

## 2020-06-10 PROCEDURE — 4040F PNEUMOC VAC/ADMIN/RCVD: CPT | Performed by: INTERNAL MEDICINE

## 2020-06-10 PROCEDURE — 3008F BODY MASS INDEX DOCD: CPT | Performed by: INTERNAL MEDICINE

## 2020-06-10 RX ORDER — NADOLOL 40 MG/1
40 TABLET ORAL DAILY
Qty: 90 TABLET | Refills: 1 | Status: SHIPPED | OUTPATIENT
Start: 2020-06-10 | End: 2020-10-19 | Stop reason: SDUPTHER

## 2020-06-10 RX ORDER — LIDOCAINE 50 MG/G
1 PATCH TOPICAL DAILY
Qty: 15 PATCH | Refills: 0 | Status: SHIPPED | OUTPATIENT
Start: 2020-06-10 | End: 2021-08-05 | Stop reason: ALTCHOICE

## 2020-06-10 RX ORDER — LORAZEPAM 1 MG/1
0.5 TABLET ORAL DAILY PRN
Qty: 30 TABLET | Refills: 5 | Status: SHIPPED | OUTPATIENT
Start: 2020-06-10 | End: 2020-10-19 | Stop reason: SDUPTHER

## 2020-06-10 RX ORDER — ZOLPIDEM TARTRATE 10 MG/1
10 TABLET ORAL
Qty: 30 TABLET | Refills: 5 | Status: SHIPPED | OUTPATIENT
Start: 2020-06-10 | End: 2020-10-19 | Stop reason: SDUPTHER

## 2020-06-10 RX ORDER — SIMVASTATIN 40 MG
40 TABLET ORAL DAILY
Qty: 90 TABLET | Refills: 1 | Status: SHIPPED | OUTPATIENT
Start: 2020-06-10 | End: 2020-10-19 | Stop reason: SDUPTHER

## 2020-06-10 RX ORDER — ESCITALOPRAM OXALATE 10 MG/1
10 TABLET ORAL DAILY
Qty: 90 TABLET | Refills: 1 | Status: SHIPPED | OUTPATIENT
Start: 2020-06-10 | End: 2020-10-19 | Stop reason: SDUPTHER

## 2020-06-10 RX ORDER — EZETIMIBE 10 MG/1
10 TABLET ORAL DAILY
Qty: 90 TABLET | Refills: 1 | Status: SHIPPED | OUTPATIENT
Start: 2020-06-10 | End: 2020-10-19 | Stop reason: SDUPTHER

## 2020-06-11 ENCOUNTER — TELEPHONE (OUTPATIENT)
Dept: INTERNAL MEDICINE CLINIC | Age: 70
End: 2020-06-11

## 2020-09-09 ENCOUNTER — APPOINTMENT (OUTPATIENT)
Dept: LAB | Facility: AMBULARY SURGERY CENTER | Age: 70
End: 2020-09-09
Payer: MEDICARE

## 2020-09-09 DIAGNOSIS — R73.01 IMPAIRED FASTING GLUCOSE: ICD-10-CM

## 2020-09-09 LAB
EST. AVERAGE GLUCOSE BLD GHB EST-MCNC: 117 MG/DL
GLUCOSE P FAST SERPL-MCNC: 112 MG/DL (ref 65–99)
HBA1C MFR BLD: 5.7 %

## 2020-09-09 PROCEDURE — 83036 HEMOGLOBIN GLYCOSYLATED A1C: CPT

## 2020-09-09 PROCEDURE — 82947 ASSAY GLUCOSE BLOOD QUANT: CPT

## 2020-09-09 PROCEDURE — 36415 COLL VENOUS BLD VENIPUNCTURE: CPT

## 2020-10-19 ENCOUNTER — OFFICE VISIT (OUTPATIENT)
Dept: INTERNAL MEDICINE CLINIC | Age: 70
End: 2020-10-19
Payer: MEDICARE

## 2020-10-19 VITALS
WEIGHT: 197.9 LBS | HEIGHT: 60 IN | BODY MASS INDEX: 38.85 KG/M2 | SYSTOLIC BLOOD PRESSURE: 130 MMHG | TEMPERATURE: 99.4 F | HEART RATE: 79 BPM | OXYGEN SATURATION: 97 % | DIASTOLIC BLOOD PRESSURE: 78 MMHG

## 2020-10-19 DIAGNOSIS — M25.562 PAIN IN BOTH KNEES, UNSPECIFIED CHRONICITY: ICD-10-CM

## 2020-10-19 DIAGNOSIS — G47.00 INSOMNIA, UNSPECIFIED TYPE: ICD-10-CM

## 2020-10-19 DIAGNOSIS — L30.4 INTERTRIGO: ICD-10-CM

## 2020-10-19 DIAGNOSIS — D69.6 THROMBOCYTOPENIA (HCC): ICD-10-CM

## 2020-10-19 DIAGNOSIS — E78.00 HYPERCHOLESTEREMIA: ICD-10-CM

## 2020-10-19 DIAGNOSIS — F41.9 ANXIETY: ICD-10-CM

## 2020-10-19 DIAGNOSIS — I10 BENIGN ESSENTIAL HYPERTENSION: Primary | ICD-10-CM

## 2020-10-19 DIAGNOSIS — I10 HYPERTENSION, UNSPECIFIED TYPE: ICD-10-CM

## 2020-10-19 DIAGNOSIS — R73.01 IMPAIRED FASTING GLUCOSE: ICD-10-CM

## 2020-10-19 DIAGNOSIS — F32.A DEPRESSION, UNSPECIFIED DEPRESSION TYPE: ICD-10-CM

## 2020-10-19 DIAGNOSIS — M25.561 PAIN IN BOTH KNEES, UNSPECIFIED CHRONICITY: ICD-10-CM

## 2020-10-19 PROBLEM — Z96.652 STATUS POST TOTAL LEFT KNEE REPLACEMENT: Status: RESOLVED | Noted: 2019-02-25 | Resolved: 2020-10-19

## 2020-10-19 PROBLEM — Z01.419 ENCOUNTER FOR GYNECOLOGICAL EXAMINATION (GENERAL) (ROUTINE) WITHOUT ABNORMAL FINDINGS: Status: RESOLVED | Noted: 2019-01-18 | Resolved: 2020-10-19

## 2020-10-19 PROCEDURE — 99214 OFFICE O/P EST MOD 30 MIN: CPT | Performed by: INTERNAL MEDICINE

## 2020-10-19 RX ORDER — SIMVASTATIN 40 MG
40 TABLET ORAL DAILY
Qty: 90 TABLET | Refills: 1 | Status: SHIPPED | OUTPATIENT
Start: 2020-10-19 | End: 2021-01-20 | Stop reason: SDUPTHER

## 2020-10-19 RX ORDER — ZOLPIDEM TARTRATE 10 MG/1
10 TABLET ORAL
Qty: 30 TABLET | Refills: 5 | Status: SHIPPED | OUTPATIENT
Start: 2020-10-19 | End: 2021-01-20 | Stop reason: SDUPTHER

## 2020-10-19 RX ORDER — LORAZEPAM 1 MG/1
0.5 TABLET ORAL DAILY PRN
Qty: 30 TABLET | Refills: 5 | Status: SHIPPED | OUTPATIENT
Start: 2020-10-19 | End: 2020-11-20

## 2020-10-19 RX ORDER — NADOLOL 40 MG/1
40 TABLET ORAL DAILY
Qty: 90 TABLET | Refills: 1 | Status: SHIPPED | OUTPATIENT
Start: 2020-10-19 | End: 2021-01-20 | Stop reason: SDUPTHER

## 2020-10-19 RX ORDER — ESCITALOPRAM OXALATE 10 MG/1
10 TABLET ORAL DAILY
Qty: 90 TABLET | Refills: 1 | Status: SHIPPED | OUTPATIENT
Start: 2020-10-19 | End: 2021-01-20 | Stop reason: SDUPTHER

## 2020-10-19 RX ORDER — EZETIMIBE 10 MG/1
10 TABLET ORAL DAILY
Qty: 90 TABLET | Refills: 1 | Status: SHIPPED | OUTPATIENT
Start: 2020-10-19 | End: 2021-01-20 | Stop reason: SDUPTHER

## 2020-10-19 RX ORDER — NYSTATIN 100000 [USP'U]/G
POWDER TOPICAL 3 TIMES DAILY
Qty: 15 G | Refills: 3 | Status: SHIPPED | OUTPATIENT
Start: 2020-10-19 | End: 2021-08-25

## 2020-10-19 NOTE — PROGRESS NOTES
Assessment/Plan:     Diagnoses and all orders for this visit:    Benign essential hypertension    Anxiety  -     Discontinue: escitalopram (LEXAPRO) 10 mg tablet; Take 1 tablet (10 mg total) by mouth daily  -     Discontinue: LORazepam (ATIVAN) 1 mg tablet; Take 0 5 tablets (0 5 mg total) by mouth daily as needed for anxiety    Hypercholesteremia  -     Discontinue: ezetimibe (ZETIA) 10 mg tablet; Take 1 tablet (10 mg total) by mouth daily  -     Discontinue: simvastatin (ZOCOR) 40 mg tablet; Take 1 tablet (40 mg total) by mouth daily    Hypertension, unspecified type  -     Discontinue: nadolol (CORGARD) 40 mg tablet; Take 1 tablet (40 mg total) by mouth daily    Intertrigo  -     nystatin (MYCOSTATIN) powder; Apply topically 3 (three) times a day    Pain in both knees, unspecified chronicity    Insomnia, unspecified type  -     Discontinue: zolpidem (AMBIEN) 10 mg tablet; Take 1 tablet (10 mg total) by mouth daily at bedtime as needed (prn)    Depression, unspecified depression type    Impaired fasting glucose    Thrombocytopenia (HCC)             Subjective:      Chief Complaint   Patient presents with    Follow-up     3 month follow up         Patient ID: Giuliana Hunt is a 69 y o  female  HPI    The following portions of the patient's history were reviewed and updated as appropriate: allergies, current medications, past family history, past medical history, past social history, past surgical history and problem list     Review of Systems   Constitutional: Positive for fatigue  Negative for chills  HENT: Negative for congestion, ear pain, hearing loss, postnasal drip, sinus pressure, sore throat and voice change  Eyes: Negative for pain, discharge and visual disturbance  Respiratory: Negative for cough, chest tightness and shortness of breath  Cardiovascular: Negative for chest pain, palpitations and leg swelling     Gastrointestinal: Negative for abdominal pain, blood in stool, diarrhea, nausea and rectal pain  Genitourinary: Negative for difficulty urinating, dysuria and urgency  Musculoskeletal: Negative for arthralgias and joint swelling  Skin: Negative for rash  Allergic/Immunologic: Negative for environmental allergies and food allergies  Neurological: Negative for dizziness, tremors, weakness, numbness and headaches  Hematological: Negative for adenopathy  Psychiatric/Behavioral: Positive for sleep disturbance  Negative for behavioral problems and hallucinations  The patient is nervous/anxious            Past Medical History:   Diagnosis Date    Anxiety     last assessed 02/18/14    Endometriosis     Generalized osteoarthritis of multiple sites     last assessed 02/18/14    Hyperlipidemia     Hypertension     Left knee pain     Major depression, single episode     mental status change    Pelvic adhesions     Squamous cell skin cancer     resolved 09/2007; back         Current Outpatient Medications:     acyclovir (ZOVIRAX) 5 % ointment, Apply topically 3 (three) times a day as needed (Herpes labialis), Disp: 15 g, Rfl: 0    lidocaine (LIDODERM) 5 %, Apply 1 patch topically daily Remove & Discard patch within 12 hours or as directed by MD, Disp: 15 patch, Rfl: 0    traMADol (ULTRAM) 50 mg tablet, Take 1 tablet (50 mg total) by mouth 2 (two) times a day as needed for moderate pain, Disp: 60 tablet, Rfl: 0    escitalopram (LEXAPRO) 10 mg tablet, Take 1 tablet (10 mg total) by mouth daily, Disp: 90 tablet, Rfl: 1    ezetimibe (ZETIA) 10 mg tablet, Take 1 tablet (10 mg total) by mouth daily, Disp: 90 tablet, Rfl: 1    LORazepam (ATIVAN) 1 mg tablet, Take 0 5 tablets (0 5 mg total) by mouth daily as needed for anxiety, Disp: 30 tablet, Rfl: 5    nadolol (CORGARD) 40 mg tablet, Take 1 tablet (40 mg total) by mouth daily, Disp: 90 tablet, Rfl: 1    nystatin (MYCOSTATIN) powder, Apply topically 3 (three) times a day, Disp: 15 g, Rfl: 3    simvastatin (ZOCOR) 40 mg tablet, Take 1 tablet (40 mg total) by mouth daily, Disp: 90 tablet, Rfl: 1    zolpidem (AMBIEN) 10 mg tablet, Take 1 tablet (10 mg total) by mouth daily at bedtime as needed (prn), Disp: 30 tablet, Rfl: 5    Allergies   Allergen Reactions    Aspirin GI Intolerance    Codeine Nausea Only, GI Intolerance and Vomiting     Derivatives    Oxycodone-Acetaminophen Vomiting and GI Intolerance     darvocet  Percocet Tabs       Social History   Past Surgical History:   Procedure Laterality Date    APPENDECTOMY      BREAST BIOPSY Left 07/01/2005    stereotactic; percutaneous needle core use imag guide    BREAST EXCISIONAL BIOPSY Left     CHOLECYSTECTOMY      PILONIDAL CYST EXCISION      TOTAL ABDOMINAL HYSTERECTOMY Bilateral 1994    age 38   [facility] TOTAL ABDOMINAL HYSTERECTOMY W/ BILATERAL SALPINGOOPHORECTOMY Bilateral 1994    age 38     Family History   Problem Relation Age of Onset    Breast cancer Mother 61        lumpectomy    Hypertension Mother     Thyroid disease Mother     Lung cancer Father     Lung cancer Paternal [de-identified]     Esophageal cancer Paternal Aunt     Kidney nephrosis Son     Hypertension Son     No Known Problems Maternal Grandmother     No Known Problems Maternal Grandfather     Dementia Paternal Grandmother     Leukemia Paternal Grandfather     No Known Problems Son     Breast cancer Maternal Aunt 79    No Known Problems Maternal Aunt     No Known Problems Maternal Aunt        Objective:  /78 (BP Location: Left arm, Patient Position: Sitting, Cuff Size: Adult)   Pulse 79   Temp 99 4 °F (37 4 °C)   Ht 5' (1 524 m)   Wt 89 8 kg (197 lb 14 4 oz)   SpO2 97%   BMI 38 65 kg/m²        Physical Exam  Vitals signs and nursing note reviewed  Constitutional:       Appearance: She is well-developed  She is obese  HENT:      Head: Normocephalic  Eyes:      Pupils: Pupils are equal, round, and reactive to light  Neck:      Musculoskeletal: Normal range of motion     Cardiovascular: Heart sounds: Murmur present  Pulmonary:      Breath sounds: Normal breath sounds  Abdominal:      General: Bowel sounds are normal    Musculoskeletal:         General: No tenderness  Skin:     General: Skin is warm  Neurological:      General: No focal deficit present  Mental Status: She is alert and oriented to person, place, and time  Mental status is at baseline

## 2020-11-20 DIAGNOSIS — F41.9 ANXIETY: ICD-10-CM

## 2020-11-25 RX ORDER — LORAZEPAM 1 MG/1
1 TABLET ORAL DAILY PRN
Qty: 30 TABLET | Refills: 5 | Status: SHIPPED | OUTPATIENT
Start: 2020-11-25 | End: 2021-01-20 | Stop reason: SDUPTHER

## 2020-12-14 ENCOUNTER — TELEMEDICINE (OUTPATIENT)
Dept: INTERNAL MEDICINE CLINIC | Age: 70
End: 2020-12-14
Payer: MEDICARE

## 2020-12-14 VITALS — BODY MASS INDEX: 33.99 KG/M2 | WEIGHT: 180 LBS | HEIGHT: 61 IN

## 2020-12-14 DIAGNOSIS — R73.03 PREDIABETES: Primary | ICD-10-CM

## 2020-12-14 PROCEDURE — 99441 PR PHYS/QHP TELEPHONE EVALUATION 5-10 MIN: CPT | Performed by: INTERNAL MEDICINE

## 2020-12-14 RX ORDER — TRAMADOL HYDROCHLORIDE 50 MG/1
50 TABLET ORAL EVERY 8 HOURS PRN
COMMUNITY
Start: 2020-11-24 | End: 2021-01-21 | Stop reason: SDUPTHER

## 2020-12-15 PROCEDURE — U0003 INFECTIOUS AGENT DETECTION BY NUCLEIC ACID (DNA OR RNA); SEVERE ACUTE RESPIRATORY SYNDROME CORONAVIRUS 2 (SARS-COV-2) (CORONAVIRUS DISEASE [COVID-19]), AMPLIFIED PROBE TECHNIQUE, MAKING USE OF HIGH THROUGHPUT TECHNOLOGIES AS DESCRIBED BY CMS-2020-01-R: HCPCS | Performed by: INTERNAL MEDICINE

## 2020-12-16 LAB — SARS-COV-2 RNA SPEC QL NAA+PROBE: NOT DETECTED

## 2020-12-18 ENCOUNTER — TELEPHONE (OUTPATIENT)
Dept: INTERNAL MEDICINE CLINIC | Age: 70
End: 2020-12-18

## 2021-01-20 ENCOUNTER — OFFICE VISIT (OUTPATIENT)
Dept: INTERNAL MEDICINE CLINIC | Age: 71
End: 2021-01-20
Payer: MEDICARE

## 2021-01-20 ENCOUNTER — TELEPHONE (OUTPATIENT)
Dept: INTERNAL MEDICINE CLINIC | Age: 71
End: 2021-01-20

## 2021-01-20 VITALS
HEART RATE: 62 BPM | OXYGEN SATURATION: 95 % | BODY MASS INDEX: 35.83 KG/M2 | HEIGHT: 61 IN | DIASTOLIC BLOOD PRESSURE: 70 MMHG | TEMPERATURE: 98.2 F | WEIGHT: 189.8 LBS | SYSTOLIC BLOOD PRESSURE: 142 MMHG

## 2021-01-20 DIAGNOSIS — R73.01 IMPAIRED FASTING GLUCOSE: ICD-10-CM

## 2021-01-20 DIAGNOSIS — E66.01 OBESITY, MORBID (HCC): ICD-10-CM

## 2021-01-20 DIAGNOSIS — I10 HYPERTENSION, UNSPECIFIED TYPE: ICD-10-CM

## 2021-01-20 DIAGNOSIS — G47.00 INSOMNIA, UNSPECIFIED TYPE: ICD-10-CM

## 2021-01-20 DIAGNOSIS — F11.90 CHRONIC NARCOTIC USE: Primary | ICD-10-CM

## 2021-01-20 DIAGNOSIS — D69.6 THROMBOCYTOPENIA (HCC): ICD-10-CM

## 2021-01-20 DIAGNOSIS — Z00.00 MEDICARE ANNUAL WELLNESS VISIT, SUBSEQUENT: ICD-10-CM

## 2021-01-20 DIAGNOSIS — F41.9 ANXIETY: ICD-10-CM

## 2021-01-20 DIAGNOSIS — I10 BENIGN ESSENTIAL HYPERTENSION: ICD-10-CM

## 2021-01-20 DIAGNOSIS — E78.00 HYPERCHOLESTEREMIA: ICD-10-CM

## 2021-01-20 PROCEDURE — 1123F ACP DISCUSS/DSCN MKR DOCD: CPT | Performed by: INTERNAL MEDICINE

## 2021-01-20 PROCEDURE — 99214 OFFICE O/P EST MOD 30 MIN: CPT | Performed by: INTERNAL MEDICINE

## 2021-01-20 PROCEDURE — G0439 PPPS, SUBSEQ VISIT: HCPCS | Performed by: INTERNAL MEDICINE

## 2021-01-20 RX ORDER — SIMVASTATIN 40 MG
40 TABLET ORAL DAILY
Qty: 90 TABLET | Refills: 1 | Status: SHIPPED | OUTPATIENT
Start: 2021-01-20 | End: 2021-08-25 | Stop reason: SDUPTHER

## 2021-01-20 RX ORDER — ZOLPIDEM TARTRATE 10 MG/1
10 TABLET ORAL
Qty: 30 TABLET | Refills: 5 | Status: SHIPPED | OUTPATIENT
Start: 2021-01-20 | End: 2021-01-21 | Stop reason: SDUPTHER

## 2021-01-20 RX ORDER — ESCITALOPRAM OXALATE 10 MG/1
10 TABLET ORAL DAILY
Qty: 90 TABLET | Refills: 1 | Status: SHIPPED | OUTPATIENT
Start: 2021-01-20 | End: 2021-08-25 | Stop reason: SDUPTHER

## 2021-01-20 RX ORDER — EZETIMIBE 10 MG/1
10 TABLET ORAL DAILY
Qty: 90 TABLET | Refills: 1 | Status: SHIPPED | OUTPATIENT
Start: 2021-01-20 | End: 2021-08-25 | Stop reason: SDUPTHER

## 2021-01-20 RX ORDER — LORAZEPAM 1 MG/1
1 TABLET ORAL DAILY PRN
Qty: 30 TABLET | Refills: 5 | Status: SHIPPED | OUTPATIENT
Start: 2021-01-20 | End: 2021-08-25

## 2021-01-20 RX ORDER — NADOLOL 40 MG/1
40 TABLET ORAL DAILY
Qty: 90 TABLET | Refills: 1 | Status: SHIPPED | OUTPATIENT
Start: 2021-01-20 | End: 2021-08-25 | Stop reason: SDUPTHER

## 2021-01-20 RX ORDER — NALOXONE HYDROCHLORIDE 4 MG/.1ML
SPRAY NASAL
Qty: 1 EACH | Refills: 1 | Status: SHIPPED | OUTPATIENT
Start: 2021-01-20

## 2021-01-20 NOTE — PROGRESS NOTES
Assessment/Plan:     Diagnoses and all orders for this visit:    Chronic narcotic use  -     naloxone (NARCAN) 4 mg/0 1 mL nasal spray; Administer 1 spray into a nostril  If no response after 2-3 minutes, give another dose in the other nostril using a new spray  Anxiety  -     escitalopram (LEXAPRO) 10 mg tablet; Take 1 tablet (10 mg total) by mouth daily  -     LORazepam (ATIVAN) 1 mg tablet; Take 1 tablet (1 mg total) by mouth daily as needed for anxiety    Hypercholesteremia  -     ezetimibe (ZETIA) 10 mg tablet; Take 1 tablet (10 mg total) by mouth daily  -     simvastatin (ZOCOR) 40 mg tablet; Take 1 tablet (40 mg total) by mouth daily    Hypertension, unspecified type  -     nadolol (CORGARD) 40 mg tablet; Take 1 tablet (40 mg total) by mouth daily  -     CBC and differential; Future  -     Comprehensive metabolic panel; Future  -     TSH, 3rd generation with Free T4 reflex; Future  -     UA w Reflex to Microscopic w Reflex to Culture    Insomnia, unspecified type  -     zolpidem (AMBIEN) 10 mg tablet; Take 1 tablet (10 mg total) by mouth daily at bedtime as needed (prn)    Obesity, morbid (HCC)  -     Lipid panel; Future    Thrombocytopenia (HCC)  -     CBC and differential; Future    Impaired fasting glucose  -     Hemoglobin A1C; Future  -     Lipid panel; Future    Benign essential hypertension  -     Lipid panel; Future        BMI Counseling: Body mass index is 35 61 kg/m²  The BMI is above normal  Nutrition recommendations include decreasing portion sizes, encouraging healthy choices of fruits and vegetables and moderation in carbohydrate intake  Exercise recommendations include moderate physical activity 150 minutes/week  Subjective:   Chief Complaint   Patient presents with    Follow-up    Hypertension    Medicare Wellness Visit     SUB     Arthritis     asking if can take over script for tramadol? Patient ID: Ryann Barron is a 69 y o  female      HPI  This is a very pleasant 69 years young lady who is here today for the regular follow-up and Medicare wellness  She wanted to wait for her colonoscopy she have not had a colonoscopy in long time  Hypertension is controlled systolic blood pressure is still high at 142 weight is stable she did lose some weight     The following portions of the patient's history were reviewed and updated as appropriate: allergies, current medications, past family history, past medical history, past social history, past surgical history and problem list     Review of Systems   Constitutional: Positive for fatigue  Negative for chills  HENT: Negative for congestion, ear pain, hearing loss, postnasal drip, sinus pressure, sore throat and voice change  Eyes: Negative for pain, discharge and visual disturbance  Respiratory: Negative for cough, chest tightness and shortness of breath  Cardiovascular: Negative for chest pain, palpitations and leg swelling  Gastrointestinal: Negative for abdominal pain, blood in stool, diarrhea, nausea and rectal pain  Genitourinary: Negative for difficulty urinating, dysuria and urgency  Musculoskeletal: Negative for arthralgias and joint swelling  Pain in the knees patient was going to see the orthopedic surgeon who is retiring and he wanted us to prescribe her tramadol which she take it as needed   Skin: Negative for rash  Allergic/Immunologic: Negative for environmental allergies and food allergies  Neurological: Negative for dizziness, tremors, weakness, numbness and headaches  Hematological: Negative for adenopathy  Psychiatric/Behavioral: Positive for sleep disturbance  Negative for behavioral problems and hallucinations  The patient is nervous/anxious            Past Medical History:   Diagnosis Date    Anxiety     last assessed 02/18/14    Endometriosis     Generalized osteoarthritis of multiple sites     last assessed 02/18/14    Hyperlipidemia     Hypertension     Left knee pain     Major depression, single episode     mental status change    Pelvic adhesions     Squamous cell skin cancer     resolved 09/2007; back         Current Outpatient Medications:     acyclovir (ZOVIRAX) 5 % ointment, Apply topically 3 (three) times a day as needed (Herpes labialis), Disp: 15 g, Rfl: 0    escitalopram (LEXAPRO) 10 mg tablet, Take 1 tablet (10 mg total) by mouth daily, Disp: 90 tablet, Rfl: 1    ezetimibe (ZETIA) 10 mg tablet, Take 1 tablet (10 mg total) by mouth daily, Disp: 90 tablet, Rfl: 1    LORazepam (ATIVAN) 1 mg tablet, Take 1 tablet (1 mg total) by mouth daily as needed for anxiety, Disp: 30 tablet, Rfl: 5    nadolol (CORGARD) 40 mg tablet, Take 1 tablet (40 mg total) by mouth daily, Disp: 90 tablet, Rfl: 1    nystatin (MYCOSTATIN) powder, Apply topically 3 (three) times a day, Disp: 15 g, Rfl: 3    simvastatin (ZOCOR) 40 mg tablet, Take 1 tablet (40 mg total) by mouth daily, Disp: 90 tablet, Rfl: 1    traMADol (ULTRAM) 50 mg tablet, Take 50 mg by mouth every 8 (eight) hours as needed, Disp: , Rfl:     zolpidem (AMBIEN) 10 mg tablet, Take 1 tablet (10 mg total) by mouth daily at bedtime as needed (prn), Disp: 30 tablet, Rfl: 5    lidocaine (LIDODERM) 5 %, Apply 1 patch topically daily Remove & Discard patch within 12 hours or as directed by MD (Patient not taking: Reported on 1/20/2021), Disp: 15 patch, Rfl: 0    Allergies   Allergen Reactions    Aspirin GI Intolerance    Codeine Nausea Only, GI Intolerance and Vomiting     Derivatives    Oxycodone-Acetaminophen Vomiting and GI Intolerance     darvocet  Percocet Tabs       Social History   Past Surgical History:   Procedure Laterality Date    APPENDECTOMY      BREAST BIOPSY Left 07/01/2005    stereotactic; percutaneous needle core use imag guide    BREAST EXCISIONAL BIOPSY Left     CHOLECYSTECTOMY      PILONIDAL CYST EXCISION      TOTAL ABDOMINAL HYSTERECTOMY Bilateral 1994    age 36    TOTAL ABDOMINAL HYSTERECTOMY W/ BILATERAL SALPINGOOPHORECTOMY Bilateral 1994    age 38     Family History   Problem Relation Age of Onset    Breast cancer Mother 61        lumpectomy    Hypertension Mother     Thyroid disease Mother     Lung cancer Father     Lung cancer Paternal [de-identified]     Esophageal cancer Paternal Aunt     Kidney nephrosis Son     Hypertension Son     No Known Problems Maternal Grandmother     No Known Problems Maternal Grandfather     Dementia Paternal Grandmother     Leukemia Paternal Grandfather     No Known Problems Son     Breast cancer Maternal Aunt 79    No Known Problems Maternal Aunt     No Known Problems Maternal Aunt        Objective:  /70 (BP Location: Left arm, Patient Position: Sitting, Cuff Size: Large)   Pulse 62   Temp 98 2 °F (36 8 °C) (Temporal)   Ht 5' 1 22" (1 555 m) Comment: shoes on  Wt 86 1 kg (189 lb 12 8 oz) Comment: shoes on  SpO2 95%   BMI 35 61 kg/m²        Physical Exam  Constitutional:       Appearance: She is well-developed  HENT:      Right Ear: External ear normal    Eyes:      Conjunctiva/sclera: Conjunctivae normal       Pupils: Pupils are equal, round, and reactive to light  Neck:      Musculoskeletal: Normal range of motion  Thyroid: No thyromegaly  Vascular: No JVD  Cardiovascular:      Rate and Rhythm: Normal rate and regular rhythm  Heart sounds: Normal heart sounds  Pulmonary:      Breath sounds: Normal breath sounds  Abdominal:      General: Bowel sounds are normal       Palpations: Abdomen is soft  Musculoskeletal: Normal range of motion  Lymphadenopathy:      Cervical: No cervical adenopathy  Skin:     General: Skin is dry  Neurological:      Mental Status: She is alert and oriented to person, place, and time  Deep Tendon Reflexes: Reflexes are normal and symmetric     Psychiatric:         Behavior: Behavior normal

## 2021-01-20 NOTE — PROGRESS NOTES
Assessment and Plan:     Problem List Items Addressed This Visit     None        BMI Counseling: Body mass index is 35 61 kg/m²  The BMI is above normal  Nutrition recommendations include decreasing portion sizes, encouraging healthy choices of fruits and vegetables and moderation in carbohydrate intake  Exercise recommendations include moderate physical activity 150 minutes/week  Preventive health issues were discussed with patient, and age appropriate screening tests were ordered as noted in patient's After Visit Summary  Personalized health advice and appropriate referrals for health education or preventive services given if needed, as noted in patient's After Visit Summary       History of Present Illness:     Patient presents for Medicare Annual Wellness visit    Patient Care Team:  Barbara Smith MD as PCP - General     Problem List:     Patient Active Problem List   Diagnosis    Benign essential hypertension    Depression    Hypercholesterolemia    Impaired fasting glucose    Insomnia    Thrombocytopenia ([address] )    Hypocalcemia      Past Medical and Surgical History:     Past Medical History:   Diagnosis Date    Anxiety     last assessed 02/18/14    Endometriosis     Generalized osteoarthritis of multiple sites     last assessed 02/18/14    Hyperlipidemia     Hypertension     Left knee pain     Major depression, single episode     mental status change    Pelvic adhesions     Squamous cell skin cancer     resolved 09/2007; back     Past Surgical History:   Procedure Laterality Date    APPENDECTOMY      BREAST BIOPSY Left 07/01/2005    stereotactic; percutaneous needle core use imag guide    BREAST EXCISIONAL BIOPSY Left     CHOLECYSTECTOMY      PILONIDAL CYST EXCISION      TOTAL ABDOMINAL HYSTERECTOMY Bilateral 1994    age 38   [facility] TOTAL ABDOMINAL HYSTERECTOMY W/ BILATERAL SALPINGOOPHORECTOMY Bilateral 1994    age 38      Family History:     Family History   Problem Relation Age of Onset    Breast cancer Mother 61        lumpectomy    Hypertension Mother     Thyroid disease Mother     Lung cancer Father     Lung cancer Paternal [de-identified]     Esophageal cancer Paternal Aunt    Pamela Sevilla Kidney nephrosis Son     Hypertension Son     No Known Problems Maternal Grandmother     No Known Problems Maternal Grandfather     Dementia Paternal Grandmother     Leukemia Paternal Grandfather     No Known Problems Son     Breast cancer Maternal Aunt 79    No Known Problems Maternal Aunt     No Known Problems Maternal Aunt       Social History:        Social History     Socioeconomic History    Marital status: /Civil Union     Spouse name: Not on file    Number of children: 2    Years of education: Not on file    Highest education level: Not on file   Occupational History    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   Social Needs    Financial resource strain: Not on file    Food insecurity     Worry: Not on file     Inability: Not on file   [employer] needs     Medical: Not on file     Non-medical: Not on file   Tobacco Use    Smoking status: Never Smoker    Smokeless tobacco: Never Used   Substance and Sexual Activity    Alcohol use: Not Currently     Comment: rarely consumes    Drug use: No    Sexual activity: Yes     Partners: Male   Lifestyle    Physical activity     Days per week: Not on file     Minutes per session: Not on file    Stress: Not on file   Relationships    Social connections     Talks on phone: Not on file     Gets together: Not on file     Attends Islam service: Not on file     Active member of club or organization: Not on file     Attends meetings of clubs or organizations: Not on file     Relationship status: Not on file    Intimate partner violence     Fear of current or ex partner: Not on file     Emotionally abused: Not on file     Physically abused: Not on file     Forced sexual activity: Not on file   Other Topics Concern    Not on file   Social History Narrative    Always uses seat belt    Caffeine use    Daily coffee consumption (2 cups/day)    Exercise habits: Walking 5x per week    Feels safe at home    Lives with spouse      Medications and Allergies:     Current Outpatient Medications   Medication Sig Dispense Refill    acyclovir (ZOVIRAX) 5 % ointment Apply topically 3 (three) times a day as needed (Herpes labialis) 15 g 0    escitalopram (LEXAPRO) 10 mg tablet Take 1 tablet (10 mg total) by mouth daily 90 tablet 1    ezetimibe (ZETIA) 10 mg tablet Take 1 tablet (10 mg total) by mouth daily 90 tablet 1    lidocaine (LIDODERM) 5 % Apply 1 patch topically daily Remove & Discard patch within 12 hours or as directed by MD 15 patch 0    LORazepam (ATIVAN) 1 mg tablet Take 1 tablet (1 mg total) by mouth daily as needed for anxiety 30 tablet 5    nadolol (CORGARD) 40 mg tablet Take 1 tablet (40 mg total) by mouth daily 90 tablet 1    nystatin (MYCOSTATIN) powder Apply topically 3 (three) times a day 15 g 3    simvastatin (ZOCOR) 40 mg tablet Take 1 tablet (40 mg total) by mouth daily 90 tablet 1    traMADol (ULTRAM) 50 mg tablet Take 50 mg by mouth every 8 (eight) hours as needed      zolpidem (AMBIEN) 10 mg tablet Take 1 tablet (10 mg total) by mouth daily at bedtime as needed (prn) 30 tablet 5     No current facility-administered medications for this visit        Allergies   Allergen Reactions    Aspirin GI Intolerance    Codeine Nausea Only, GI Intolerance and Vomiting     Derivatives    Oxycodone-Acetaminophen Vomiting and GI Intolerance     darvocet  Percocet Tabs      Immunizations:     Immunization History   Administered Date(s) Administered    H1N1, All Formulations 01/13/2010    INFLUENZA 11/28/2006, 10/19/2007, 09/28/2018, 10/08/2019, 09/28/2020    Influenza Split High Dose Preservative Free IM 09/29/2014, 10/26/2015, 10/08/2019    Influenza, high dose seasonal 0 7 mL 10/08/2019, 09/28/2020    Influenza, seasonal, injectable 1949, 11/19/2012, 10/06/2016, 10/16/2017    Pneumococcal Conjugate 13-Valent 09/28/2018    Pneumococcal Polysaccharide PPV23 06/13/2014      Health Maintenance:         Topic Date Due    Colorectal Cancer Screening  02/27/1999    MAMMOGRAM  06/03/2021    Hepatitis C Screening  Completed         Topic Date Due    DTaP,Tdap,and Td Vaccines (1 - Tdap) 02/27/1970      Medicare Health Risk Assessment:     There were no vitals taken for this visit  Mandeep Lagos is here for her Subsequent Wellness visit  Health Risk Assessment:   Patient rates overall health as good  Patient feels that their physical health rating is slightly better  Eyesight was rated as same  Hearing was rated as slightly worse  Patient feels that their emotional and mental health rating is slightly better  Pain experienced in the last 7 days has been some  Patient's pain rating has been 5/10  Patient states that she has experienced weight loss or gain in last 6 months  Depression Screening:   PHQ-2 Score: 1  PHQ-9 Score: 3      Fall Risk Screening: In the past year, patient has experienced: no history of falling in past year      Urinary Incontinence Screening:   Patient has leaked urine accidently in the last six months  Home Safety:  Patient has trouble with stairs inside or outside of their home  Patient has working smoke alarms and has working carbon monoxide detector  Home safety hazards include: none  Nutrition:   Current diet is Regular  Medications:   Patient is not currently taking any over-the-counter supplements  Patient is able to manage medications  Activities of Daily Living (ADLs)/Instrumental Activities of Daily Living (IADLs):   Walk and transfer into and out of bed and chair?: Yes  Dress and groom yourself?: Yes    Bathe or shower yourself?: Yes    Feed yourself?  Yes  Do your laundry/housekeeping?: Yes  Manage your money, pay your bills and track your expenses?: Yes  Make your own meals?: Yes    Do your own shopping?: Yes    Previous Hospitalizations:   Any hospitalizations or ED visits within the last 12 months?: No      Advance Care Planning:   Living will: No    Durable POA for healthcare: No    Advanced directive: Yes    Advanced directive counseling given: Yes    Five wishes given: Yes      Cognitive Screening:   Provider or family/friend/caregiver concerned regarding cognition?: No    PREVENTIVE SCREENINGS      Cardiovascular Screening:    General: Screening Not Indicated and History Lipid Disorder      Diabetes Screening:     General: Screening Current      Breast Cancer Screening:     General: Screening Current      Cervical Cancer Screening:    General: Screening Not Indicated      Abdominal Aortic Aneurysm (AAA) Screening:        General: Patient Declines      Lung Cancer Screening:     General: Screening Not Indicated      Hepatitis C Screening:    General: Screening Current    Other Counseling Topics:   Calcium and vitamin D intake and regular weightbearing exercise         Laura Marinelli MD

## 2021-01-20 NOTE — TELEPHONE ENCOUNTER
Please schedule the COVID vaccine for this patient once it is available for under the age of 76     Please call home number

## 2021-01-21 DIAGNOSIS — G89.29 CHRONIC PAIN OF BOTH KNEES: ICD-10-CM

## 2021-01-21 DIAGNOSIS — M19.90 ARTHRITIS: ICD-10-CM

## 2021-01-21 DIAGNOSIS — G47.00 INSOMNIA, UNSPECIFIED TYPE: ICD-10-CM

## 2021-01-21 DIAGNOSIS — M25.561 CHRONIC PAIN OF BOTH KNEES: ICD-10-CM

## 2021-01-21 DIAGNOSIS — D69.6 THROMBOCYTOPENIA (HCC): Primary | ICD-10-CM

## 2021-01-21 DIAGNOSIS — M25.562 CHRONIC PAIN OF BOTH KNEES: ICD-10-CM

## 2021-01-21 RX ORDER — TRAMADOL HYDROCHLORIDE 50 MG/1
50 TABLET ORAL EVERY 8 HOURS PRN
Qty: 90 TABLET | Refills: 5 | Status: SHIPPED | OUTPATIENT
Start: 2021-01-21 | End: 2021-05-24 | Stop reason: SDUPTHER

## 2021-01-21 RX ORDER — ZOLPIDEM TARTRATE 10 MG/1
10 TABLET ORAL
Qty: 30 TABLET | Refills: 5 | Status: SHIPPED | OUTPATIENT
Start: 2021-01-21 | End: 2021-05-24 | Stop reason: SDUPTHER

## 2021-01-21 NOTE — TELEPHONE ENCOUNTER
1/21/21 LS 1/20/21, NA 5/24/2021, checked the PDMP-- I called to KEEGAN/C  ambien script sent yesterday at   [facility]--wants sent to [facility] -Tramadol- ok to send per Dr Wong Short-- pt's ortho surgeon is retiring - - tramadol is 50 mg - 8 qh prn - ok for 6 months for both per  Dr Wong Short

## 2021-02-09 DIAGNOSIS — Z12.11 SCREENING FOR MALIGNANT NEOPLASM OF COLON: Primary | ICD-10-CM

## 2021-02-22 ENCOUNTER — TELEPHONE (OUTPATIENT)
Dept: INTERNAL MEDICINE CLINIC | Facility: CLINIC | Age: 71
End: 2021-02-22

## 2021-02-22 NOTE — TELEPHONE ENCOUNTER
LMOM (no details left in message)  Called to check status of cologuard kit that was ordered on 02/09/2021

## 2021-03-02 DIAGNOSIS — Z23 ENCOUNTER FOR IMMUNIZATION: ICD-10-CM

## 2021-03-05 ENCOUNTER — IMMUNIZATIONS (OUTPATIENT)
Dept: FAMILY MEDICINE CLINIC | Facility: HOSPITAL | Age: 71
End: 2021-03-05

## 2021-03-05 DIAGNOSIS — Z23 ENCOUNTER FOR IMMUNIZATION: Primary | ICD-10-CM

## 2021-03-05 PROCEDURE — 0001A SARS-COV-2 / COVID-19 MRNA VACCINE (PFIZER-BIONTECH) 30 MCG: CPT

## 2021-03-05 PROCEDURE — 91300 SARS-COV-2 / COVID-19 MRNA VACCINE (PFIZER-BIONTECH) 30 MCG: CPT

## 2021-03-24 ENCOUNTER — IMMUNIZATIONS (OUTPATIENT)
Dept: FAMILY MEDICINE CLINIC | Facility: HOSPITAL | Age: 71
End: 2021-03-24

## 2021-03-24 DIAGNOSIS — Z23 ENCOUNTER FOR IMMUNIZATION: Primary | ICD-10-CM

## 2021-03-24 PROCEDURE — 91300 SARS-COV-2 / COVID-19 MRNA VACCINE (PFIZER-BIONTECH) 30 MCG: CPT

## 2021-03-24 PROCEDURE — 0002A SARS-COV-2 / COVID-19 MRNA VACCINE (PFIZER-BIONTECH) 30 MCG: CPT

## 2021-05-13 ENCOUNTER — TELEPHONE (OUTPATIENT)
Dept: INTERNAL MEDICINE CLINIC | Facility: CLINIC | Age: 71
End: 2021-05-13

## 2021-05-13 NOTE — TELEPHONE ENCOUNTER
Spoke to patient and she will try to return coleliasuard to one of the [facility] offices within the next week

## 2021-05-18 ENCOUNTER — APPOINTMENT (OUTPATIENT)
Dept: LAB | Age: 71
End: 2021-05-18
Payer: MEDICARE

## 2021-05-18 ENCOUNTER — TRANSCRIBE ORDERS (OUTPATIENT)
Dept: ADMINISTRATIVE | Age: 71
End: 2021-05-18

## 2021-05-18 DIAGNOSIS — I10 ESSENTIAL HYPERTENSION, MALIGNANT: ICD-10-CM

## 2021-05-18 DIAGNOSIS — R73.01 IMPAIRED FASTING GLUCOSE: ICD-10-CM

## 2021-05-18 DIAGNOSIS — E66.01 MORBID OBESITY (HCC): Primary | ICD-10-CM

## 2021-05-18 DIAGNOSIS — E66.01 MORBID OBESITY (HCC): ICD-10-CM

## 2021-05-18 LAB
ALBUMIN SERPL BCP-MCNC: 3.6 G/DL (ref 3.5–5)
ALP SERPL-CCNC: 115 U/L (ref 46–116)
ALT SERPL W P-5'-P-CCNC: 19 U/L (ref 12–78)
ANION GAP SERPL CALCULATED.3IONS-SCNC: 5 MMOL/L (ref 4–13)
AST SERPL W P-5'-P-CCNC: 22 U/L (ref 5–45)
BACTERIA UR QL AUTO: ABNORMAL /HPF
BASOPHILS # BLD AUTO: 0.04 THOUSANDS/ΜL (ref 0–0.1)
BASOPHILS NFR BLD AUTO: 1 % (ref 0–1)
BILIRUB SERPL-MCNC: 0.69 MG/DL (ref 0.2–1)
BILIRUB UR QL STRIP: NEGATIVE
BUN SERPL-MCNC: 11 MG/DL (ref 5–25)
CALCIUM SERPL-MCNC: 9.2 MG/DL (ref 8.3–10.1)
CHLORIDE SERPL-SCNC: 105 MMOL/L (ref 100–108)
CHOLEST SERPL-MCNC: 151 MG/DL (ref 50–200)
CLARITY UR: ABNORMAL
CO2 SERPL-SCNC: 31 MMOL/L (ref 21–32)
COLOR UR: ABNORMAL
CREAT SERPL-MCNC: 0.71 MG/DL (ref 0.6–1.3)
EOSINOPHIL # BLD AUTO: 0.07 THOUSAND/ΜL (ref 0–0.61)
EOSINOPHIL NFR BLD AUTO: 1 % (ref 0–6)
ERYTHROCYTE [DISTWIDTH] IN BLOOD BY AUTOMATED COUNT: 13.2 % (ref 11.6–15.1)
EST. AVERAGE GLUCOSE BLD GHB EST-MCNC: 105 MG/DL
GFR SERPL CREATININE-BSD FRML MDRD: 85 ML/MIN/1.73SQ M
GLUCOSE P FAST SERPL-MCNC: 104 MG/DL (ref 65–99)
GLUCOSE UR STRIP-MCNC: NEGATIVE MG/DL
HBA1C MFR BLD: 5.3 %
HCT VFR BLD AUTO: 42.8 % (ref 34.8–46.1)
HDLC SERPL-MCNC: 67 MG/DL
HGB BLD-MCNC: 14 G/DL (ref 11.5–15.4)
HGB UR QL STRIP.AUTO: NEGATIVE
IMM GRANULOCYTES # BLD AUTO: 0.01 THOUSAND/UL (ref 0–0.2)
IMM GRANULOCYTES NFR BLD AUTO: 0 % (ref 0–2)
KETONES UR STRIP-MCNC: NEGATIVE MG/DL
LDLC SERPL CALC-MCNC: 68 MG/DL (ref 0–100)
LEUKOCYTE ESTERASE UR QL STRIP: ABNORMAL
LYMPHOCYTES # BLD AUTO: 1.38 THOUSANDS/ΜL (ref 0.6–4.47)
LYMPHOCYTES NFR BLD AUTO: 27 % (ref 14–44)
MCH RBC QN AUTO: 28.9 PG (ref 26.8–34.3)
MCHC RBC AUTO-ENTMCNC: 32.7 G/DL (ref 31.4–37.4)
MCV RBC AUTO: 88 FL (ref 82–98)
MONOCYTES # BLD AUTO: 0.35 THOUSAND/ΜL (ref 0.17–1.22)
MONOCYTES NFR BLD AUTO: 7 % (ref 4–12)
NEUTROPHILS # BLD AUTO: 3.31 THOUSANDS/ΜL (ref 1.85–7.62)
NEUTS SEG NFR BLD AUTO: 64 % (ref 43–75)
NITRITE UR QL STRIP: NEGATIVE
NON-SQ EPI CELLS URNS QL MICRO: ABNORMAL /HPF
NONHDLC SERPL-MCNC: 84 MG/DL
NRBC BLD AUTO-RTO: 0 /100 WBCS
PH UR STRIP.AUTO: 6 [PH]
PLATELET # BLD AUTO: 129 THOUSANDS/UL (ref 149–390)
PMV BLD AUTO: 10.7 FL (ref 8.9–12.7)
POTASSIUM SERPL-SCNC: 4 MMOL/L (ref 3.5–5.3)
PROT SERPL-MCNC: 7 G/DL (ref 6.4–8.2)
PROT UR STRIP-MCNC: NEGATIVE MG/DL
RBC # BLD AUTO: 4.84 MILLION/UL (ref 3.81–5.12)
RBC #/AREA URNS AUTO: ABNORMAL /HPF
SODIUM SERPL-SCNC: 141 MMOL/L (ref 136–145)
SP GR UR STRIP.AUTO: 1.02 (ref 1–1.03)
TRIGL SERPL-MCNC: 81 MG/DL
TSH SERPL DL<=0.05 MIU/L-ACNC: 2.37 UIU/ML (ref 0.36–3.74)
UROBILINOGEN UR QL STRIP.AUTO: 1 E.U./DL
WBC # BLD AUTO: 5.16 THOUSAND/UL (ref 4.31–10.16)
WBC #/AREA URNS AUTO: ABNORMAL /HPF

## 2021-05-18 PROCEDURE — 80061 LIPID PANEL: CPT

## 2021-05-18 PROCEDURE — 85025 COMPLETE CBC W/AUTO DIFF WBC: CPT

## 2021-05-18 PROCEDURE — 83036 HEMOGLOBIN GLYCOSYLATED A1C: CPT

## 2021-05-18 PROCEDURE — 84443 ASSAY THYROID STIM HORMONE: CPT

## 2021-05-18 PROCEDURE — 81001 URINALYSIS AUTO W/SCOPE: CPT | Performed by: INTERNAL MEDICINE

## 2021-05-18 PROCEDURE — 36415 COLL VENOUS BLD VENIPUNCTURE: CPT

## 2021-05-18 PROCEDURE — 80053 COMPREHEN METABOLIC PANEL: CPT

## 2021-05-24 ENCOUNTER — OFFICE VISIT (OUTPATIENT)
Dept: INTERNAL MEDICINE CLINIC | Age: 71
End: 2021-05-24
Payer: MEDICARE

## 2021-05-24 VITALS
SYSTOLIC BLOOD PRESSURE: 142 MMHG | BODY MASS INDEX: 36.82 KG/M2 | HEART RATE: 70 BPM | WEIGHT: 195 LBS | TEMPERATURE: 97.9 F | DIASTOLIC BLOOD PRESSURE: 72 MMHG | OXYGEN SATURATION: 99 % | HEIGHT: 61 IN

## 2021-05-24 DIAGNOSIS — E66.01 OBESITY, MORBID (HCC): ICD-10-CM

## 2021-05-24 DIAGNOSIS — Z12.31 ENCOUNTER FOR SCREENING MAMMOGRAM FOR MALIGNANT NEOPLASM OF BREAST: Primary | ICD-10-CM

## 2021-05-24 DIAGNOSIS — I10 BENIGN ESSENTIAL HYPERTENSION: ICD-10-CM

## 2021-05-24 DIAGNOSIS — G47.00 INSOMNIA, UNSPECIFIED TYPE: ICD-10-CM

## 2021-05-24 DIAGNOSIS — R73.01 IMPAIRED FASTING GLUCOSE: ICD-10-CM

## 2021-05-24 DIAGNOSIS — M19.90 ARTHRITIS: ICD-10-CM

## 2021-05-24 DIAGNOSIS — M25.562 CHRONIC PAIN OF BOTH KNEES: ICD-10-CM

## 2021-05-24 DIAGNOSIS — G89.29 CHRONIC PAIN OF BOTH KNEES: ICD-10-CM

## 2021-05-24 DIAGNOSIS — M25.561 CHRONIC PAIN OF BOTH KNEES: ICD-10-CM

## 2021-05-24 DIAGNOSIS — D69.6 THROMBOCYTOPENIA (HCC): ICD-10-CM

## 2021-05-24 PROCEDURE — 99214 OFFICE O/P EST MOD 30 MIN: CPT | Performed by: INTERNAL MEDICINE

## 2021-05-24 RX ORDER — TRAMADOL HYDROCHLORIDE 50 MG/1
50 TABLET ORAL EVERY 8 HOURS PRN
Qty: 90 TABLET | Refills: 5 | Status: SHIPPED | OUTPATIENT
Start: 2021-05-24 | End: 2021-09-23 | Stop reason: SDUPTHER

## 2021-05-24 RX ORDER — ZOLPIDEM TARTRATE 5 MG/1
5 TABLET ORAL
Qty: 30 TABLET | Refills: 5 | Status: SHIPPED | OUTPATIENT
Start: 2021-05-24 | End: 2021-09-23 | Stop reason: SDUPTHER

## 2021-05-24 NOTE — PROGRESS NOTES
Assessment/Plan:     Diagnoses and all orders for this visit:    Encounter for screening mammogram for malignant neoplasm of breast  -     Mammo screening bilateral w 3d & cad; Future    Thrombocytopenia (HCC)  -     traMADol (ULTRAM) 50 mg tablet; Take 1 tablet (50 mg total) by mouth every 8 (eight) hours as needed (prn)    Arthritis  -     traMADol (ULTRAM) 50 mg tablet; Take 1 tablet (50 mg total) by mouth every 8 (eight) hours as needed (prn)    Chronic pain of both knees  -     traMADol (ULTRAM) 50 mg tablet; Take 1 tablet (50 mg total) by mouth every 8 (eight) hours as needed (prn)    Insomnia, unspecified type  -     zolpidem (AMBIEN) 5 mg tablet; Take 1 tablet (5 mg total) by mouth daily at bedtime as needed (prn)    Impaired fasting glucose    Benign essential hypertension    Obesity, morbid (HCC)             Subjective:   Chief Complaint   Patient presents with    Follow-up     revew labs 5/18/21    Hypertension    Ankle Problem     is seeing ortho          cologuard- sent back/ results not ready yet         Patient ID: Jen Humphreys is a 66 y o  female  HPI  This is a very pleasant 66 years young lady who is here today for the regular follow-up recently she to stand her right ankle and was seen by the orthopedic still the ankle is little swollen mostly on lateral side the pain is better than before  Hypertension could be better systolic blood pressure is 142 it was 142 last time to she gained some more lb she was 189 last time now it is 195 I discussed with her regarding the diet exercise and weight loss to decrease comorbidities  Impaired fasting blood sugar is very well controlled    Anxiety depression and sleep problems with insomnia are stable with the present medication  Hypercholesterolemia lipid panel was excellent  Mild decrease in the platelet count we will follow  Will follow her up in about 3 months unless any problem    The following portions of the patient's history were reviewed and updated as appropriate: allergies, current medications, past family history, past medical history, past social history, past surgical history and problem list     Review of Systems   Constitutional: Negative for chills and fatigue  HENT: Negative for congestion, ear pain, hearing loss, postnasal drip, sinus pressure, sore throat and voice change  Eyes: Negative for pain, discharge and visual disturbance  Respiratory: Negative for cough, chest tightness and shortness of breath  Cardiovascular: Negative for chest pain, palpitations and leg swelling  Gastrointestinal: Negative for abdominal pain, blood in stool, diarrhea, nausea and rectal pain  Genitourinary: Negative for difficulty urinating, dysuria and urgency  Musculoskeletal: Negative for arthralgias and joint swelling  Right ankle pain and swelling after twisting was seen by the Orthopedics   Skin: Negative for rash  Allergic/Immunologic: Negative for environmental allergies and food allergies  Neurological: Negative for dizziness, tremors, weakness, numbness and headaches  Hematological: Negative for adenopathy  Psychiatric/Behavioral: Negative for behavioral problems and hallucinations           Past Medical History:   Diagnosis Date    Anxiety     last assessed 02/18/14    Arthritis     Depression     Endometriosis     Generalized osteoarthritis of multiple sites     last assessed 02/18/14    HL (hearing loss) (    Hyperlipidemia     Hypertension     Left knee pain     Major depression, single episode     mental status change    Pelvic adhesions     Squamous cell skin cancer     resolved 09/2007; back         Current Outpatient Medications:     escitalopram (LEXAPRO) 10 mg tablet, Take 1 tablet (10 mg total) by mouth daily, Disp: 90 tablet, Rfl: 1    ezetimibe (ZETIA) 10 mg tablet, Take 1 tablet (10 mg total) by mouth daily, Disp: 90 tablet, Rfl: 1    LORazepam (ATIVAN) 1 mg tablet, Take 1 tablet (1 mg total) by mouth daily as needed for anxiety, Disp: 30 tablet, Rfl: 5    nadolol (CORGARD) 40 mg tablet, Take 1 tablet (40 mg total) by mouth daily, Disp: 90 tablet, Rfl: 1    nystatin (MYCOSTATIN) powder, Apply topically 3 (three) times a day, Disp: 15 g, Rfl: 3    simvastatin (ZOCOR) 40 mg tablet, Take 1 tablet (40 mg total) by mouth daily, Disp: 90 tablet, Rfl: 1    traMADol (ULTRAM) 50 mg tablet, Take 1 tablet (50 mg total) by mouth every 8 (eight) hours as needed (prn), Disp: 90 tablet, Rfl: 5    zolpidem (AMBIEN) 10 mg tablet, Take 1 tablet (10 mg total) by mouth daily at bedtime as needed (prn), Disp: 30 tablet, Rfl: 5    acyclovir (ZOVIRAX) 5 % ointment, Apply topically 3 (three) times a day as needed (Herpes labialis) (Patient not taking: Reported on 5/24/2021), Disp: 15 g, Rfl: 0    lidocaine (LIDODERM) 5 %, Apply 1 patch topically daily Remove & Discard patch within 12 hours or as directed by MD (Patient not taking: Reported on 1/20/2021), Disp: 15 patch, Rfl: 0    naloxone (NARCAN) 4 mg/0 1 mL nasal spray, Administer 1 spray into a nostril  If no response after 2-3 minutes, give another dose in the other nostril using a new spray   (Patient not taking: Reported on 5/24/2021), Disp: 1 each, Rfl: 1    Allergies   Allergen Reactions    Aspirin GI Intolerance    Codeine Nausea Only, GI Intolerance and Vomiting     Derivatives    Oxycodone-Acetaminophen Vomiting and GI Intolerance     darvocet  Percocet Tabs       Social History   Past Surgical History:   Procedure Laterality Date    APPENDECTOMY      BREAST BIOPSY Left 07/01/2005    stereotactic; percutaneous needle core use imag guide    BREAST EXCISIONAL BIOPSY Left     CHOLECYSTECTOMY      PILONIDAL CYST EXCISION      TOTAL ABDOMINAL HYSTERECTOMY Bilateral 1994    age 38   [facility] TOTAL ABDOMINAL HYSTERECTOMY W/ BILATERAL SALPINGOOPHORECTOMY Bilateral 1994    age 38     Family History   Problem Relation Age of Onset    Breast cancer Mother 61 lumpectomy    Hypertension Mother     Thyroid disease Mother     Lung cancer Father     Lung cancer Paternal Aunt     Esophageal cancer Paternal Aunt     Kidney nephrosis Son     Hypertension Son     No Known Problems Maternal Grandmother     No Known Problems Maternal Grandfather     Dementia Paternal Grandmother     Leukemia Paternal Grandfather     No Known Problems Son     Breast cancer Maternal Aunt 79    No Known Problems Maternal Aunt     No Known Problems Maternal Aunt        Objective:  /72 (BP Location: Left arm, Patient Position: Sitting, Cuff Size: Large)   Pulse 70   Temp 97 9 °F (36 6 °C) (Temporal)   Ht 5' 1 22" (1 555 m) Comment: shoes on  Wt 88 5 kg (195 lb) Comment: shoes on  SpO2 99%   BMI 36 58 kg/m²        Physical Exam  Constitutional:       Appearance: She is well-developed  HENT:      Right Ear: External ear normal    Eyes:      Conjunctiva/sclera: Conjunctivae normal       Pupils: Pupils are equal, round, and reactive to light  Neck:      Musculoskeletal: Normal range of motion  Thyroid: No thyromegaly  Vascular: No JVD  Cardiovascular:      Rate and Rhythm: Normal rate and regular rhythm  Heart sounds: Normal heart sounds  Pulmonary:      Breath sounds: Normal breath sounds  Abdominal:      General: Bowel sounds are normal       Palpations: Abdomen is soft  Musculoskeletal: Normal range of motion  Lymphadenopathy:      Cervical: No cervical adenopathy  Skin:     General: Skin is dry  Neurological:      Mental Status: She is alert and oriented to person, place, and time  Deep Tendon Reflexes: Reflexes are normal and symmetric     Psychiatric:         Behavior: Behavior normal

## 2021-05-25 ENCOUNTER — TELEPHONE (OUTPATIENT)
Dept: INTERNAL MEDICINE CLINIC | Age: 71
End: 2021-05-25

## 2021-05-25 DIAGNOSIS — R19.5 POSITIVE COLORECTAL CANCER SCREENING USING COLOGUARD TEST: Primary | ICD-10-CM

## 2021-05-25 NOTE — TELEPHONE ENCOUNTER
Katie from [facility] called to find out if we got the results of the Cologuard on this patient  It is positive and wants to make sure we will take care of it      I stated that we will have a nurse and provider follow up on the results

## 2021-05-26 NOTE — TELEPHONE ENCOUNTER
Discussed with Dr Mechelle Villatoro, pt called and informed of positive results  Pt informed next step is to schedule for a colonoscopy  Order placed, number for central scheduling given to pt

## 2021-06-04 ENCOUNTER — HOSPITAL ENCOUNTER (OUTPATIENT)
Dept: RADIOLOGY | Age: 71
Discharge: HOME | End: 2021-06-04
Payer: MEDICARE

## 2021-06-04 VITALS — HEIGHT: 61 IN | WEIGHT: 195 LBS | BODY MASS INDEX: 36.82 KG/M2

## 2021-06-04 DIAGNOSIS — Z12.31 ENCOUNTER FOR SCREENING MAMMOGRAM FOR MALIGNANT NEOPLASM OF BREAST: ICD-10-CM

## 2021-06-04 PROCEDURE — 77067 SCR MAMMO BI INCL CAD: CPT

## 2021-06-04 PROCEDURE — 77063 BREAST TOMOSYNTHESIS BI: CPT

## 2021-07-22 ENCOUNTER — TELEPHONE (OUTPATIENT)
Dept: GASTROENTEROLOGY | Facility: AMBULARY SURGERY CENTER | Age: 71
End: 2021-07-22

## 2021-07-22 ENCOUNTER — OFFICE VISIT (OUTPATIENT)
Dept: GASTROENTEROLOGY | Facility: AMBULARY SURGERY CENTER | Age: 71
End: 2021-07-22
Payer: MEDICARE

## 2021-07-22 ENCOUNTER — PREP FOR PROCEDURE (OUTPATIENT)
Dept: GASTROENTEROLOGY | Facility: AMBULARY SURGERY CENTER | Age: 71
End: 2021-07-22

## 2021-07-22 VITALS
SYSTOLIC BLOOD PRESSURE: 162 MMHG | DIASTOLIC BLOOD PRESSURE: 80 MMHG | WEIGHT: 195 LBS | HEIGHT: 61 IN | BODY MASS INDEX: 36.82 KG/M2

## 2021-07-22 DIAGNOSIS — R19.5 POSITIVE COLORECTAL CANCER SCREENING USING COLOGUARD TEST: ICD-10-CM

## 2021-07-22 DIAGNOSIS — R19.5 POSITIVE COLORECTAL CANCER SCREENING USING COLOGUARD TEST: Primary | ICD-10-CM

## 2021-07-22 PROCEDURE — 99203 OFFICE O/P NEW LOW 30 MIN: CPT | Performed by: PHYSICIAN ASSISTANT

## 2021-07-22 NOTE — TELEPHONE ENCOUNTER
I called and left a VM message requesting a call back  She should contact her ortho surgeon for his recommendation

## 2021-07-22 NOTE — PROGRESS NOTES
[facility] - Outpatient Consultation  Scar Livingston 66 y o  female MRN: [id number]  Encounter: [id number]          ASSESSMENT AND PLAN:        1  Positive Cologuard  57-year-old female past medical history of hypertension, depression, insomnia presents after testing showed positive Cologuard 05/2021  Hemoglobin at that time was normal at 14 0  LFTs and BMP were unremarkable  Platelets slightly decreased at 129  Patient denies any GI symptoms at this time including constipation, diarrhea, blood in stool, abdominal pain, decreased weight  Denies family history of colon cancer  Denies smoking or alcohol use  Denies any upper GI symptoms including acid reflux, dysphagia, odynophagia, early satiety  No blood thinners  She has never had colonoscopy     -discussed the statistics of Cologuard testing   -schedule colonoscopy  -MiraLax/Dulcolax prep  -discussed the risks of the procedure including bleeding, infection and perforation  -will follow-up results after procedure   -will send message to patient's primary care physician as she reports she usually takes antibiotics prior to procedures since knee surgery  Patient will follow up after procedure   ______________________________________________________________________    HPI:      Scar Livingston is a 57-year-old female with past medical history of hypertension, depression, insomnia presents as a new patient for positive Cologuard  Patient denies any GI symptoms at this time including constipation, diarrhea, blood in the stool, abdominal pain, early satiety, unintentional weight loss, fatigue  She also denies any upper GI symptoms including acid reflux, dysphagia, odynophagia, nausea, vomiting or early satiety  She denies any family history of colon cancer  She has never had a colonoscopy  Past surgical history of cholecystectomy, hysterectomy, left knee replacement      Recent blood work with normal hemoglobin, LFTs and BMP  REVIEW OF SYSTEMS:    CONSTITUTIONAL: Denies any fever, chills, rigors, and weight loss  HEENT: No earache or tinnitus  Denies hearing loss or visual disturbances  CARDIOVASCULAR: No chest pain or palpitations  RESPIRATORY: Denies any cough, hemoptysis, shortness of breath or dyspnea on exertion  GASTROINTESTINAL: As noted in the History of Present Illness  GENITOURINARY: No problems with urination  Denies any hematuria or dysuria  NEUROLOGIC: No dizziness or vertigo, denies headaches  MUSCULOSKELETAL: Denies any muscle or joint pain  SKIN: Denies skin rashes or itching  ENDOCRINE: Denies excessive thirst  Denies intolerance to heat or cold  PSYCHOSOCIAL: Denies depression or anxiety  Denies any recent memory loss         Historical Information   Past Medical History:   Diagnosis Date    Anxiety     last assessed 02/18/14    Arthritis     Depression     Endometriosis     Generalized osteoarthritis of multiple sites     last assessed 02/18/14    HL (hearing loss) (    Hyperlipidemia     Hypertension     Left knee pain     Major depression, single episode     mental status change    Pelvic adhesions     Squamous cell skin cancer     resolved 09/2007; back     Past Surgical History:   Procedure Laterality Date    APPENDECTOMY      BREAST BIOPSY Left 07/01/2005    stereotactic; percutaneous needle core use imag guide    BREAST EXCISIONAL BIOPSY Left     CHOLECYSTECTOMY      PILONIDAL CYST EXCISION      TOTAL ABDOMINAL HYSTERECTOMY Bilateral 1994    age 38   [facility] TOTAL ABDOMINAL HYSTERECTOMY W/ BILATERAL SALPINGOOPHORECTOMY Bilateral 1994    age 38     Social History   Social History     Substance and Sexual Activity   Alcohol Use Yes    Alcohol/week: 1 0 standard drinks    Types: 1 Glasses of wine per week    Comment: rarely consumes     Social History     Substance and Sexual Activity   Drug Use No     Social History     Tobacco Use   Smoking Status Never Smoker Smokeless Tobacco Never Used     Family History   Problem Relation Age of Onset    Breast cancer Mother 61        lumpectomy    Hypertension Mother     Thyroid disease Mother     Lung cancer Father     Lung cancer Paternal [de-identified]     Esophageal cancer Paternal Aunt     Kidney nephrosis Son     Hypertension Son     No Known Problems Maternal Grandmother     No Known Problems Maternal Grandfather     Dementia Paternal Grandmother     Leukemia Paternal Grandfather     No Known Problems Son     Breast cancer Maternal Aunt 79    No Known Problems Maternal Aunt     No Known Problems Maternal Aunt        Meds/Allergies       Current Outpatient Medications:     escitalopram (LEXAPRO) 10 mg tablet    ezetimibe (ZETIA) 10 mg tablet    Ibuprofen (ADVIL PO)    LORazepam (ATIVAN) 1 mg tablet    nadolol (CORGARD) 40 mg tablet    naloxone (NARCAN) 4 mg/0 1 mL nasal spray    nystatin (MYCOSTATIN) powder    simvastatin (ZOCOR) 40 mg tablet    traMADol (ULTRAM) 50 mg tablet    zolpidem (AMBIEN) 5 mg tablet    acyclovir (ZOVIRAX) 5 % ointment    lidocaine (LIDODERM) 5 %    Allergies   Allergen Reactions    Aspirin GI Intolerance    Codeine Nausea Only, GI Intolerance and Vomiting     Derivatives    Oxycodone-Acetaminophen Vomiting and GI Intolerance     darvocet  Percocet Tabs           Objective     Blood pressure 162/80, height 5' 1" (1 549 m), weight 88 5 kg (195 lb), not currently breastfeeding  Body mass index is 36 84 kg/m²  PHYSICAL EXAM:      General Appearance:   Alert, cooperative, no distress/ patient is sitting comfortably in chair  HEENT:   Normocephalic, atraumatic, anicteric      Neck:  Supple, symmetrical, trachea midline   Lungs:   Clear to auscultation bilaterally; no rales, rhonchi or wheezing; respirations unlabored    Heart[de-identified]   Regular rate and rhythm; no murmur, rub, or gallop     Abdomen:   Soft, non-tender, non-distended; normal bowel sounds; no masses, no organomegaly  Genitalia:   Deferred    Rectal:   Deferred    Extremities:  No cyanosis, clubbing or edema    Pulses:  2+ and symmetric    Skin:  No jaundice, rashes, or lesions    Lymph nodes:  No palpable cervical lymphadenopathy        Lab Results:   No visits with results within 1 Day(s) from this visit  Latest known visit with results is:   Appointment on 05/18/2021   Component Date Value    Cholesterol 05/18/2021 151     Triglycerides 05/18/2021 81     HDL, Direct 05/18/2021 67     LDL Calculated 05/18/2021 68     Non-HDL-Chol (CHOL-HDL) 05/18/2021 84     WBC 05/18/2021 5 16     RBC 05/18/2021 4 84     Hemoglobin 05/18/2021 14 0     Hematocrit 05/18/2021 42 8     MCV 05/18/2021 88     MCH 05/18/2021 28 9     MCHC 05/18/2021 32 7     RDW 05/18/2021 13 2     MPV 05/18/2021 10 7     Platelets 86/28/8546 129*    nRBC 05/18/2021 0     Neutrophils Relative 05/18/2021 64     Immat GRANS % 05/18/2021 0     Lymphocytes Relative 05/18/2021 27     Monocytes Relative 05/18/2021 7     Eosinophils Relative 05/18/2021 1     Basophils Relative 05/18/2021 1     Neutrophils Absolute 05/18/2021 3 31     Immature Grans Absolute 05/18/2021 0 01     Lymphocytes Absolute 05/18/2021 1 38     Monocytes Absolute 05/18/2021 0 35     Eosinophils Absolute 05/18/2021 0 07     Basophils Absolute 05/18/2021 0 04     Sodium 05/18/2021 141     Potassium 05/18/2021 4 0     Chloride 05/18/2021 105     CO2 05/18/2021 31     ANION GAP 05/18/2021 5     BUN 05/18/2021 11     Creatinine 05/18/2021 0 71     Glucose, Fasting 05/18/2021 104*    Calcium 05/18/2021 9 2     AST 05/18/2021 22     ALT 05/18/2021 19     Alkaline Phosphatase 05/18/2021 115     Total Protein 05/18/2021 7 0     Albumin 05/18/2021 3 6     Total Bilirubin 05/18/2021 0 69     eGFR 05/18/2021 85     Hemoglobin A1C 05/18/2021 5 3     EAG 05/18/2021 105     TSH 3RD GENERATON 05/18/2021 2 370          Radiology Results:   No results found

## 2021-07-22 NOTE — TELEPHONE ENCOUNTER
Patient is scheduled for colonoscopy on August 5 , 2021 at [facility]  with Easton Bauer MD  Patient is aware of pre-procedure prep of Miralax/Dulcolax and they will be called the day prior between 2 and 6 pm for time to report for procedure

## 2021-07-22 NOTE — TELEPHONE ENCOUNTER
Patient is scheduled for a colonoscopy on 8/5/2021 with Dr Barrett Morning, patient is asking if she needs to take an antibiotic prior to her colonoscopy as she does for dental procedures and a past knee replacement?

## 2021-07-23 ENCOUNTER — TELEPHONE (OUTPATIENT)
Dept: INTERNAL MEDICINE CLINIC | Age: 71
End: 2021-07-23

## 2021-07-23 NOTE — TELEPHONE ENCOUNTER
I called and spoke with Margo Zaman  She will call her ortho provider for direction on premedication for colonoscopy

## 2021-07-23 NOTE — TELEPHONE ENCOUNTER
Pt is returning your phone call  She will be available in a few hours or this afternoon      [facility]

## 2021-08-04 ENCOUNTER — TELEPHONE (OUTPATIENT)
Dept: GASTROENTEROLOGY | Facility: AMBULARY SURGERY CENTER | Age: 71
End: 2021-08-04

## 2021-08-05 ENCOUNTER — ANESTHESIA (OUTPATIENT)
Dept: GASTROENTEROLOGY | Facility: AMBULARY SURGERY CENTER | Age: 71
End: 2021-08-05

## 2021-08-05 ENCOUNTER — HOSPITAL ENCOUNTER (OUTPATIENT)
Dept: GASTROENTEROLOGY | Facility: AMBULARY SURGERY CENTER | Age: 71
Setting detail: OUTPATIENT SURGERY
Discharge: HOME | End: 2021-08-05
Attending: INTERNAL MEDICINE | Admitting: INTERNAL MEDICINE
Payer: MEDICARE

## 2021-08-05 ENCOUNTER — ANESTHESIA EVENT (OUTPATIENT)
Dept: GASTROENTEROLOGY | Facility: AMBULARY SURGERY CENTER | Age: 71
End: 2021-08-05

## 2021-08-05 VITALS
HEIGHT: 61 IN | RESPIRATION RATE: 17 BRPM | TEMPERATURE: 98.3 F | HEART RATE: 70 BPM | WEIGHT: 188 LBS | BODY MASS INDEX: 35.5 KG/M2 | OXYGEN SATURATION: 94 % | DIASTOLIC BLOOD PRESSURE: 68 MMHG | SYSTOLIC BLOOD PRESSURE: 142 MMHG

## 2021-08-05 DIAGNOSIS — R19.5 POSITIVE COLORECTAL CANCER SCREENING USING COLOGUARD TEST: ICD-10-CM

## 2021-08-05 PROCEDURE — 88305 TISSUE EXAM BY PATHOLOGIST: CPT | Performed by: PATHOLOGY

## 2021-08-05 PROCEDURE — 45385 COLONOSCOPY W/LESION REMOVAL: CPT | Performed by: INTERNAL MEDICINE

## 2021-08-05 RX ORDER — LIDOCAINE HYDROCHLORIDE 10 MG/ML
INJECTION, SOLUTION EPIDURAL; INFILTRATION; INTRACAUDAL; PERINEURAL AS NEEDED
Status: DISCONTINUED | OUTPATIENT
Start: 2021-08-05 | End: 2021-08-05

## 2021-08-05 RX ORDER — SODIUM CHLORIDE, SODIUM LACTATE, POTASSIUM CHLORIDE, CALCIUM CHLORIDE 600; 310; 30; 20 MG/100ML; MG/100ML; MG/100ML; MG/100ML
125 INJECTION, SOLUTION INTRAVENOUS CONTINUOUS
Status: DISCONTINUED | OUTPATIENT
Start: 2021-08-05 | End: 2021-08-09 | Stop reason: HOSPADM

## 2021-08-05 RX ORDER — PROPOFOL 10 MG/ML
INJECTION, EMULSION INTRAVENOUS AS NEEDED
Status: DISCONTINUED | OUTPATIENT
Start: 2021-08-05 | End: 2021-08-05

## 2021-08-05 RX ORDER — GLYCOPYRROLATE 0.2 MG/ML
INJECTION INTRAMUSCULAR; INTRAVENOUS AS NEEDED
Status: DISCONTINUED | OUTPATIENT
Start: 2021-08-05 | End: 2021-08-05

## 2021-08-05 RX ORDER — LIDOCAINE HYDROCHLORIDE 10 MG/ML
0.5 INJECTION, SOLUTION EPIDURAL; INFILTRATION; INTRACAUDAL; PERINEURAL ONCE AS NEEDED
Status: DISCONTINUED | OUTPATIENT
Start: 2021-08-05 | End: 2021-08-09 | Stop reason: HOSPADM

## 2021-08-05 RX ADMIN — PROPOFOL 50 MG: 10 INJECTION, EMULSION INTRAVENOUS at 09:32

## 2021-08-05 RX ADMIN — PROPOFOL 40 MG: 10 INJECTION, EMULSION INTRAVENOUS at 09:38

## 2021-08-05 RX ADMIN — Medication 40 MG: at 09:34

## 2021-08-05 RX ADMIN — GLYCOPYRROLATE 0.2 MG: 0.2 INJECTION, SOLUTION INTRAMUSCULAR; INTRAVENOUS at 09:33

## 2021-08-05 RX ADMIN — LIDOCAINE HYDROCHLORIDE 80 MG: 10 INJECTION, SOLUTION EPIDURAL; INFILTRATION; INTRACAUDAL at 09:28

## 2021-08-05 RX ADMIN — PROPOFOL 40 MG: 10 INJECTION, EMULSION INTRAVENOUS at 09:48

## 2021-08-05 RX ADMIN — PROPOFOL 70 MG: 10 INJECTION, EMULSION INTRAVENOUS at 09:29

## 2021-08-05 RX ADMIN — SODIUM CHLORIDE, SODIUM LACTATE, POTASSIUM CHLORIDE, AND CALCIUM CHLORIDE: .6; .31; .03; .02 INJECTION, SOLUTION INTRAVENOUS at 09:26

## 2021-08-05 RX ADMIN — PROPOFOL 40 MG: 10 INJECTION, EMULSION INTRAVENOUS at 09:35

## 2021-08-05 RX ADMIN — PROPOFOL 40 MG: 10 INJECTION, EMULSION INTRAVENOUS at 09:43

## 2021-08-05 NOTE — ANESTHESIA POSTPROCEDURE EVALUATION
Post-Op Assessment Note    CV Status:  Stable  Pain Score: 0    Pain management: adequate     Mental Status:  Alert   Hydration Status:  Stable   PONV Controlled:  None   Airway Patency:  Patent      Post Op Vitals Reviewed: Yes      Staff: CRNA         No complications documented      BP  112/63   Temp  98   Pulse  80   Resp   14   SpO2   94% room air

## 2021-08-05 NOTE — H&P
History and Physical - [facility]  Scar Livingston 66 y o  female MRN: [id number]    HPI: Scar Livingston is a 66y o  year old female who presents for evaluation of positive Cologuard test       Review of Systems    Historical Information   Past Medical History:   Diagnosis Date    Anxiety     last assessed 02/18/14    Arthritis     Depression     Endometriosis     Generalized osteoarthritis of multiple sites     last assessed 02/18/14    HL (hearing loss) (    Hyperlipidemia     Hypertension     Left knee pain     Major depression, single episode     mental status change    Pelvic adhesions     Squamous cell skin cancer     resolved 09/2007; back     Past Surgical History:   Procedure Laterality Date    APPENDECTOMY      BREAST BIOPSY Left 07/01/2005    stereotactic; percutaneous needle core use imag guide    BREAST EXCISIONAL BIOPSY Left     CHOLECYSTECTOMY      PILONIDAL CYST EXCISION      TOTAL ABDOMINAL HYSTERECTOMY Bilateral 1994    age 38   [facility] TOTAL ABDOMINAL HYSTERECTOMY W/ BILATERAL SALPINGOOPHORECTOMY Bilateral 1994    age 38     Social History   Social History     Substance and Sexual Activity   Alcohol Use Yes    Alcohol/week: 1 0 standard drinks    Types: 1 Glasses of wine per week    Comment: rarely consumes     Social History     Substance and Sexual Activity   Drug Use No     Social History     Tobacco Use   Smoking Status Never Smoker   Smokeless Tobacco Never Used     Family History   Problem Relation Age of Onset   [facility] Breast cancer Mother 61        lumpectomy    Hypertension Mother     Thyroid disease Mother     Cancer Mother 80        abdominal mass    Lung cancer Father     Lung cancer Paternal [de-identified]     Esophageal cancer Paternal Aunt     Kidney nephrosis Son     Hypertension Son     No Known Problems Maternal Grandmother     No Known Problems Maternal Grandfather     Dementia Paternal Grandmother     Leukemia Paternal Grandfather     No Known Problems Son     Breast cancer Maternal Aunt 79    No Known Problems Maternal Aunt     No Known Problems Maternal Aunt        Meds/Allergies     (Not in a hospital admission)      Allergies   Allergen Reactions    Aspirin GI Intolerance    Codeine Nausea Only, GI Intolerance and Vomiting     Derivatives    Oxycodone-Acetaminophen Vomiting and GI Intolerance     darvocet  Percocet Tabs       Objective     BP (!) 175/74   Pulse 60 Comment: sr  Temp (!) 96 6 °F (35 9 °C) (Temporal)   Resp 18   Ht 5' 1" (1 549 m)   Wt 85 3 kg (188 lb)   SpO2 96%   BMI 35 52 kg/m²       PHYSICAL EXAM    Gen: NAD  CV: RRR  CHEST: Clear  ABD: soft, NT/ND  EXT: no edema  Neuro: AAO      ASSESSMENT/PLAN:  This is a 66y o  year old female here for colon cancer screening secondary to positive Cologuard test     PLAN:   Procedure:  Colonoscopy

## 2021-08-05 NOTE — ANESTHESIA PREPROCEDURE EVALUATION
Procedure:  COLONOSCOPY    Relevant Problems   ANESTHESIA (within normal limits)      CARDIO   (+) Benign essential hypertension   (+) Hypercholesterolemia   (-) Chest pain   (-) CANALES (dyspnea on exertion)      /RENAL (within normal limits)      NEURO/PSYCH   (+) Depression      PULMONARY   (-) Shortness of breath   (-) URI (upper respiratory infection)      Other   (+) Obesity, morbid (HCC)        Physical Exam    Airway    Mallampati score: II  TM Distance: >3 FB  Neck ROM: full     Dental       Cardiovascular      Pulmonary      Other Findings        Anesthesia Plan  ASA Score- 2     Anesthesia Type- IV sedation with anesthesia with ASA Monitors  Additional Monitors:   Airway Plan:           Plan Factors-Exercise tolerance (METS): >4 METS  Chart reviewed  Existing labs reviewed  Induction- intravenous  Postoperative Plan-     Informed Consent- Anesthetic plan and risks discussed with patient  I personally reviewed this patient with the CRNA  Discussed and agreed on the Anesthesia Plan with the DIMITRIOS Mays

## 2021-08-09 NOTE — RESULT ENCOUNTER NOTE
Please inform the patient that the polyp in the colon was completely benign, would recommend repeat colonoscopy in 5 years

## 2021-08-25 ENCOUNTER — OFFICE VISIT (OUTPATIENT)
Dept: INTERNAL MEDICINE CLINIC | Age: 71
End: 2021-08-25
Payer: MEDICARE

## 2021-08-25 VITALS
DIASTOLIC BLOOD PRESSURE: 78 MMHG | BODY MASS INDEX: 36.76 KG/M2 | TEMPERATURE: 98.1 F | OXYGEN SATURATION: 95 % | HEIGHT: 61 IN | WEIGHT: 194.7 LBS | SYSTOLIC BLOOD PRESSURE: 130 MMHG | HEART RATE: 62 BPM

## 2021-08-25 DIAGNOSIS — F32.A DEPRESSION, UNSPECIFIED DEPRESSION TYPE: ICD-10-CM

## 2021-08-25 DIAGNOSIS — F41.9 ANXIETY: ICD-10-CM

## 2021-08-25 DIAGNOSIS — E78.00 HYPERCHOLESTEREMIA: ICD-10-CM

## 2021-08-25 DIAGNOSIS — R73.01 IMPAIRED FASTING GLUCOSE: ICD-10-CM

## 2021-08-25 DIAGNOSIS — I10 HYPERTENSION, UNSPECIFIED TYPE: ICD-10-CM

## 2021-08-25 DIAGNOSIS — I10 BENIGN ESSENTIAL HYPERTENSION: Primary | ICD-10-CM

## 2021-08-25 DIAGNOSIS — E66.01 OBESITY, MORBID (HCC): ICD-10-CM

## 2021-08-25 DIAGNOSIS — G47.00 INSOMNIA, UNSPECIFIED TYPE: ICD-10-CM

## 2021-08-25 DIAGNOSIS — D69.6 THROMBOCYTOPENIA (HCC): ICD-10-CM

## 2021-08-25 PROCEDURE — 99214 OFFICE O/P EST MOD 30 MIN: CPT | Performed by: INTERNAL MEDICINE

## 2021-08-25 RX ORDER — EZETIMIBE 10 MG/1
10 TABLET ORAL DAILY
Qty: 90 TABLET | Refills: 1 | Status: SHIPPED | OUTPATIENT
Start: 2021-08-25 | End: 2022-02-08 | Stop reason: SDUPTHER

## 2021-08-25 RX ORDER — ESCITALOPRAM OXALATE 10 MG/1
10 TABLET ORAL DAILY
Qty: 90 TABLET | Refills: 1 | Status: SHIPPED | OUTPATIENT
Start: 2021-08-25 | End: 2022-02-08 | Stop reason: SDUPTHER

## 2021-08-25 RX ORDER — SIMVASTATIN 40 MG
40 TABLET ORAL DAILY
Qty: 90 TABLET | Refills: 1 | Status: SHIPPED | OUTPATIENT
Start: 2021-08-25 | End: 2022-02-08 | Stop reason: SDUPTHER

## 2021-08-25 RX ORDER — TRAZODONE HYDROCHLORIDE 50 MG/1
50 TABLET ORAL
Qty: 30 TABLET | Refills: 2 | Status: SHIPPED | OUTPATIENT
Start: 2021-08-25 | End: 2021-09-23

## 2021-08-25 RX ORDER — NADOLOL 40 MG/1
40 TABLET ORAL DAILY
Qty: 90 TABLET | Refills: 1 | Status: SHIPPED | OUTPATIENT
Start: 2021-08-25 | End: 2022-02-08 | Stop reason: SDUPTHER

## 2021-08-25 NOTE — PROGRESS NOTES
Assessment/Plan:     Diagnoses and all orders for this visit:    Benign essential hypertension  Tension is very well controlled  Anxiety  -     escitalopram (LEXAPRO) 10 mg tablet; Take 1 tablet (10 mg total) by mouth daily    Hypercholesteremia  -     simvastatin (ZOCOR) 40 mg tablet; Take 1 tablet (40 mg total) by mouth daily  -     ezetimibe (ZETIA) 10 mg tablet; Take 1 tablet (10 mg total) by mouth daily    Hypertension, unspecified type  -     nadolol (CORGARD) 40 mg tablet; Take 1 tablet (40 mg total) by mouth daily    Depression, unspecified depression type    Impaired fasting glucose  Increased fasting blood sugar with the normal hemoglobin A1c  Thrombocytopenia (HCC)  Very mild thrombocytopenia  Obesity, morbid (HCC)  Diet exercise and weight loss recommended  Insomnia, unspecified type  -     traZODone (DESYREL) 50 mg tablet; Take 1 tablet (50 mg total) by mouth daily at bedtime as needed for sleep             M*Modal software was used to dictate this note  It may contain errors with dictating incorrect words or incorrect spelling  Please contact the provider directly with any questions  Subjective:   Chief Complaint   Patient presents with    Follow-up     3 month follow up for HTN  Patient ID: Alejo Tovar is a 66 y o  female  HPI  This is a very pleasant 66 years young lady who is here today for the regular follow-up recently she had a positive colo got test and then colonoscopy was done which essentially unremarkable    Hypertension is very well controlled continue with the same medication  Biggest problem is anxiety depression and insomnia anxiety and depression is stable and better since she had a normal colonoscopy  We decreased her zolpidem from 10 mg to 5 mg and since then she is having problem with the maintaining and initiating the sleep I discussed with her the side effects of higher dose of zolpidem in the patient specially the woman  Will add 50 mg of trazodone to her 5 mg of Ambien and see how she does  Lorazepam she is only taking as needed but she is not taking much  Tramadol she is using regularly because she has the renovating her mom's home  Aches and pains and tiredness but otherwise she is doing well  The following portions of the patient's history were reviewed and updated as appropriate: allergies, current medications, past family history, past medical history, past social history, past surgical history and problem list     Review of Systems   Constitutional: Positive for fatigue  Negative for chills  HENT: Negative for congestion, ear pain, hearing loss, postnasal drip, sinus pressure, sore throat and voice change  Eyes: Negative for pain, discharge and visual disturbance  Respiratory: Negative for cough, chest tightness and shortness of breath  Cardiovascular: Negative for chest pain, palpitations and leg swelling  Gastrointestinal: Negative for abdominal pain, blood in stool, diarrhea, nausea and rectal pain  Genitourinary: Negative for difficulty urinating, dysuria and urgency  Musculoskeletal: Positive for back pain and myalgias  Negative for arthralgias and joint swelling  Skin: Negative for rash  Allergic/Immunologic: Negative for environmental allergies and food allergies  Neurological: Negative for dizziness, tremors, weakness, numbness and headaches  Hematological: Negative for adenopathy  Psychiatric/Behavioral: Negative for behavioral problems and hallucinations           Past Medical History:   Diagnosis Date    Anxiety     last assessed 02/18/14    Arthritis     Depression     Endometriosis     Generalized osteoarthritis of multiple sites     last assessed 02/18/14    HL (hearing loss) (    Hyperlipidemia     Hypertension     Left knee pain     Major depression, single episode     mental status change    Pelvic adhesions     Squamous cell skin cancer     resolved 09/2007; back         Current Outpatient Medications:    escitalopram (LEXAPRO) 10 mg tablet, Take 1 tablet (10 mg total) by mouth daily, Disp: 90 tablet, Rfl: 1    ezetimibe (ZETIA) 10 mg tablet, Take 1 tablet (10 mg total) by mouth daily, Disp: 90 tablet, Rfl: 1    Ibuprofen (ADVIL PO), Take by mouth as needed , Disp: , Rfl:     LORazepam (ATIVAN) 1 mg tablet, Take 1 tablet (1 mg total) by mouth daily as needed for anxiety, Disp: 30 tablet, Rfl: 5    nadolol (CORGARD) 40 mg tablet, Take 1 tablet (40 mg total) by mouth daily, Disp: 90 tablet, Rfl: 1    naloxone (NARCAN) 4 mg/0 1 mL nasal spray, Administer 1 spray into a nostril   If no response after 2-3 minutes, give another dose in the other nostril using a new spray , Disp: 1 each, Rfl: 1    simvastatin (ZOCOR) 40 mg tablet, Take 1 tablet (40 mg total) by mouth daily, Disp: 90 tablet, Rfl: 1    traMADol (ULTRAM) 50 mg tablet, Take 1 tablet (50 mg total) by mouth every 8 (eight) hours as needed (prn), Disp: 90 tablet, Rfl: 5    zolpidem (AMBIEN) 5 mg tablet, Take 1 tablet (5 mg total) by mouth daily at bedtime as needed (prn), Disp: 30 tablet, Rfl: 5    acyclovir (ZOVIRAX) 5 % ointment, Apply topically 3 (three) times a day as needed (Herpes labialis) (Patient not taking: Reported on 8/25/2021), Disp: 15 g, Rfl: 0    nystatin (MYCOSTATIN) powder, Apply topically 3 (three) times a day (Patient not taking: Reported on 8/25/2021), Disp: 15 g, Rfl: 3    traZODone (DESYREL) 50 mg tablet, Take 1 tablet (50 mg total) by mouth daily at bedtime as needed for sleep, Disp: 30 tablet, Rfl: 2    Allergies   Allergen Reactions    Aspirin GI Intolerance    Codeine Nausea Only, GI Intolerance and Vomiting     Derivatives    Oxycodone-Acetaminophen Vomiting and GI Intolerance     darvocet  Percocet Tabs       Social History   Past Surgical History:   Procedure Laterality Date    APPENDECTOMY      BREAST BIOPSY Left 07/01/2005    stereotactic; percutaneous needle core use imag guide    BREAST EXCISIONAL BIOPSY Left     CHOLECYSTECTOMY      COLONOSCOPY      PILONIDAL CYST EXCISION      TOTAL ABDOMINAL HYSTERECTOMY Bilateral 1994    age 38   [facility] TOTAL ABDOMINAL HYSTERECTOMY W/ BILATERAL SALPINGOOPHORECTOMY Bilateral 1994    age 38     Family History   Problem Relation Age of Onset    Breast cancer Mother 61        lumpectomy    Hypertension Mother     Thyroid disease Mother     Cancer Mother 80        abdominal mass    Lung cancer Father     Lung cancer Paternal [de-identified]     Esophageal cancer Paternal Aunt     Kidney nephrosis Son     Hypertension Son     No Known Problems Maternal Grandmother     No Known Problems Maternal Grandfather     Dementia Paternal Grandmother     Leukemia Paternal Grandfather     No Known Problems Son     Breast cancer Maternal Aunt 79    No Known Problems Maternal Aunt     No Known Problems Maternal Aunt        Objective:  /78 (BP Location: Left arm, Patient Position: Sitting, Cuff Size: Standard)   Pulse 62   Temp 98 1 °F (36 7 °C) (Temporal)   Ht 5' 1" (1 549 m)   Wt 88 3 kg (194 lb 11 2 oz)   SpO2 95%   BMI 36 79 kg/m²        Physical Exam  Vitals and nursing note reviewed  Constitutional:       Appearance: She is well-developed  She is obese  HENT:      Head: Normocephalic  Eyes:      Pupils: Pupils are equal, round, and reactive to light  Cardiovascular:      Heart sounds: Murmur heard  Pulmonary:      Breath sounds: Normal breath sounds  Abdominal:      General: Bowel sounds are normal    Musculoskeletal:         General: No tenderness  Cervical back: Normal range of motion  Skin:     General: Skin is warm  Neurological:      Mental Status: She is alert and oriented to person, place, and time

## 2021-09-23 ENCOUNTER — OFFICE VISIT (OUTPATIENT)
Dept: INTERNAL MEDICINE CLINIC | Age: 71
End: 2021-09-23
Payer: MEDICARE

## 2021-09-23 VITALS
OXYGEN SATURATION: 99 % | HEIGHT: 61 IN | BODY MASS INDEX: 37.06 KG/M2 | TEMPERATURE: 98.4 F | HEART RATE: 77 BPM | WEIGHT: 196.3 LBS | DIASTOLIC BLOOD PRESSURE: 70 MMHG | SYSTOLIC BLOOD PRESSURE: 140 MMHG

## 2021-09-23 DIAGNOSIS — G89.29 CHRONIC PAIN OF BOTH KNEES: ICD-10-CM

## 2021-09-23 DIAGNOSIS — M25.561 CHRONIC PAIN OF BOTH KNEES: ICD-10-CM

## 2021-09-23 DIAGNOSIS — G47.00 INSOMNIA, UNSPECIFIED TYPE: ICD-10-CM

## 2021-09-23 DIAGNOSIS — M25.562 CHRONIC PAIN OF BOTH KNEES: ICD-10-CM

## 2021-09-23 DIAGNOSIS — D69.6 THROMBOCYTOPENIA (HCC): ICD-10-CM

## 2021-09-23 DIAGNOSIS — M19.90 ARTHRITIS: ICD-10-CM

## 2021-09-23 PROCEDURE — 99213 OFFICE O/P EST LOW 20 MIN: CPT | Performed by: STUDENT IN AN ORGANIZED HEALTH CARE EDUCATION/TRAINING PROGRAM

## 2021-09-23 RX ORDER — TRAMADOL HYDROCHLORIDE 50 MG/1
50 TABLET ORAL EVERY 8 HOURS PRN
Qty: 90 TABLET | Refills: 5 | Status: SHIPPED | OUTPATIENT
Start: 2021-09-23 | End: 2022-02-08 | Stop reason: SDUPTHER

## 2021-09-23 RX ORDER — TRAMADOL HYDROCHLORIDE 50 MG/1
50 TABLET ORAL EVERY 8 HOURS PRN
Qty: 90 TABLET | Refills: 5 | Status: CANCELLED | OUTPATIENT
Start: 2021-09-23 | End: 2022-09-23

## 2021-09-23 RX ORDER — ZOLPIDEM TARTRATE 5 MG/1
5 TABLET ORAL
Qty: 30 TABLET | Refills: 5 | Status: SHIPPED | OUTPATIENT
Start: 2021-09-23 | End: 2022-02-08 | Stop reason: SDUPTHER

## 2021-09-23 NOTE — PROGRESS NOTES
ASSESSMENT/PLAN:    Case and plan discussed with Dr Aniket Seaman      Diagnoses and all orders for this visit:    Insomnia, unspecified type  Discontinue Trazodone  - Advised to restart lexapro daily  - will not increased ambien at the moment  - Can consider gabapentin in f/u visit   -     zolpidem (AMBIEN) 5 mg tablet; Take 1 tablet (5 mg total) by mouth daily at bedtime as needed (prn)    Chronic pain of both knees  -     traMADol (ULTRAM) 50 mg tablet; Take 1 tablet (50 mg total) by mouth every 8 (eight) hours as needed (prn)          Immunization History   Administered Date(s) Administered    H1N1, All Formulations 01/13/2010    INFLUENZA 11/28/2006, 10/19/2007, 09/28/2018, 10/08/2019, 09/28/2020    Influenza Split High Dose Preservative Free IM 09/29/2014, 10/26/2015, 10/08/2019    Influenza, high dose seasonal 0 7 mL 10/08/2019, 09/28/2020    Influenza, seasonal, injectable 1950, 11/19/2012, 10/06/2016, 10/16/2017    Pneumococcal Conjugate 13-Valent 09/28/2018    Pneumococcal Polysaccharide PPV23 06/13/2014    SARS-CoV-2 / COVID-19 mRNA IM (Pfizer-BioNTech) 03/05/2021, 03/24/2021         HISTORY OF PRESENT ILLNESS:    Presents for f/u  Last visit in 8/25  At time she was started on trazodone due to insomnia considering her Kathyanne Collie had been decreased a few weeks back  She reports she stopped taking the trazodone after two weeks since she felt no changes in her sleep  She was also feeling fatigued and light headed  She has been sleeping about 4 hrs a night  Reports some anxiety but not out of control  Has not been taking the lorazepam in months  Reports taking tramadol PRN, at least once a day to a max of 2-3 depending on her pain level  Admits to not take the lexapro  Denies feelings of depression or sadness despite her mom's death last year  She had multiple questions about the COVID booster and influenza vaccine, all questions and concerns answered to hers satisfaction          Review of Systems Constitutional: Negative for appetite change and fatigue  HENT: Negative  Negative for congestion and sore throat  Eyes: Negative  Negative for visual disturbance  Respiratory: Negative for shortness of breath  Cardiovascular: Negative for chest pain and palpitations  Gastrointestinal: Negative for abdominal pain, constipation, diarrhea and nausea  Endocrine: Negative  Genitourinary: Negative  Negative for dysuria  Musculoskeletal: Negative  Joint pain   Skin: Negative  Negative for rash and wound  Neurological: Negative for weakness and headaches  Psychiatric/Behavioral: Negative for confusion and sleep disturbance  OBJECTIVE:  Vitals:    09/23/21 0957   BP: 140/70   BP Location: Left arm   Patient Position: Sitting   Cuff Size: Large   Pulse: 77   Temp: 98 4 °F (36 9 °C)   TempSrc: Temporal   SpO2: 99%   Weight: 89 kg (196 lb 4 8 oz)   Height: 5' 1" (1 549 m)       Physical Exam:   GENERAL: NAD, Normal appearance  Non diaphoretic, non-toxic, not ill-appearing, well-developed, well-nourished  NEUROLOGIC:  Alert/oriented x3  No motor or sensory deficits  HEENT:  NC/AT, PERRL, EOMI, MMM, no scleral icterus  CARDIAC:  RRR, +S1/S2, no S3/S4 heard, no m/g/r  PULMONARY:  non-labored breathing, CTA B/L, no wheezing/rales/rhonci appreciated at time of encounter  ABDOMEN:  Soft, NT/ND, +BS, no rebound/guarding/rigidity  Extremities:  2+ Pulses in DP/PT  No edema, cyanosis, or clubbing  Right knee crepitus, no effusio  SKIN:  No rashes or erythema        Current Outpatient Medications:     escitalopram (LEXAPRO) 10 mg tablet, Take 1 tablet (10 mg total) by mouth daily, Disp: 90 tablet, Rfl: 1    ezetimibe (ZETIA) 10 mg tablet, Take 1 tablet (10 mg total) by mouth daily, Disp: 90 tablet, Rfl: 1    nadolol (CORGARD) 40 mg tablet, Take 1 tablet (40 mg total) by mouth daily, Disp: 90 tablet, Rfl: 1    naloxone (NARCAN) 4 mg/0 1 mL nasal spray, Administer 1 spray into a nostril  If no response after 2-3 minutes, give another dose in the other nostril using a new spray , Disp: 1 each, Rfl: 1    simvastatin (ZOCOR) 40 mg tablet, Take 1 tablet (40 mg total) by mouth daily, Disp: 90 tablet, Rfl: 1    traMADol (ULTRAM) 50 mg tablet, Take 1 tablet (50 mg total) by mouth every 8 (eight) hours as needed (prn), Disp: 90 tablet, Rfl: 5    zolpidem (AMBIEN) 5 mg tablet, Take 1 tablet (5 mg total) by mouth daily at bedtime as needed (prn), Disp: 30 tablet, Rfl: 5    traZODone (DESYREL) 50 mg tablet, Take 1 tablet (50 mg total) by mouth daily at bedtime as needed for sleep (Patient not taking: Reported on 9/23/2021), Disp: 30 tablet, Rfl: 2    Past Medical History:   Diagnosis Date    Anxiety     last assessed 02/18/14    Arthritis     Depression     Endometriosis     Generalized osteoarthritis of multiple sites     last assessed 02/18/14    HL (hearing loss) (    Hyperlipidemia     Hypertension     Left knee pain     Major depression, single episode     mental status change    Pelvic adhesions     Squamous cell skin cancer     resolved 09/2007; back     Past Surgical History:   Procedure Laterality Date    APPENDECTOMY      BREAST BIOPSY Left 07/01/2005    stereotactic; percutaneous needle core use imag guide    BREAST EXCISIONAL BIOPSY Left     CHOLECYSTECTOMY      COLONOSCOPY      PILONIDAL CYST EXCISION      TOTAL ABDOMINAL HYSTERECTOMY Bilateral 1994    age 38   Yadira Hernandez TOTAL ABDOMINAL HYSTERECTOMY W/ BILATERAL SALPINGOOPHORECTOMY Bilateral 1994    age 38     Family History   Problem Relation Age of Onset    Breast cancer Mother 61        lumpectomy    Hypertension Mother     Thyroid disease Mother     Cancer Mother 80        abdominal mass    Lung cancer Father     Lung cancer Paternal [de-identified]     Esophageal cancer Paternal Aunt     Kidney nephrosis Son     Hypertension Son     No Known Problems Maternal Grandmother     No Known Problems Maternal Grandfather     Dementia Paternal Grandmother     Leukemia Paternal Grandfather     No Known Problems Son     Breast cancer Maternal Aunt 79    No Known Problems Maternal Aunt     No Known Problems Maternal Aunt      Social History     Socioeconomic History    Marital status: /Civil Union     Spouse name: Not on file    Number of children: 2    Years of education: Not on file    Highest education level: Not on file   Occupational History    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   Tobacco Use    Smoking status: Never Smoker    Smokeless tobacco: Never Used   Vaping Use    Vaping Use: Never used   Substance and Sexual Activity    Alcohol use: Yes     Alcohol/week: 1 0 standard drinks     Types: 1 Glasses of wine per week     Comment: rarely consumes    Drug use: No    Sexual activity: Yes     Partners: Male     Birth control/protection: Surgical   Other Topics Concern    Not on file   Social History Narrative    Always uses seat belt    Caffeine use    Daily coffee consumption (2 cups/day)    Exercise habits: Walking 5x per week    Feels safe at home    Lives with spouse     Social Determinants of Health     Financial Resource Strain:     Difficulty of Paying Living Expenses:    Food Insecurity:     Worried About Running Out of Food in the Last Year:     Ran Out of Food in the Last Year:    Transportation Needs:     Lack of Transportation (Medical):      Lack of Transportation (Non-Medical):    Physical Activity:     Days of Exercise per Week:     Minutes of Exercise per Session:    Stress:     Feeling of Stress :    Social Connections:     Frequency of Communication with Friends and Family:     Frequency of Social Gatherings with Friends and Family:     Attends Yarsanism Services:     Active Member of Clubs or Organizations:     Attends Club or Organization Meetings:     Marital Status:    Intimate Partner Violence:     Fear of Current or Ex-Partner:     Emotionally Abused:     Physically Abused:     Sexually Abused:      Social History     Tobacco Use   Smoking Status Never Smoker   Smokeless Tobacco Never Used     Social History     Substance and Sexual Activity   Alcohol Use Yes    Alcohol/week: 1 0 standard drinks    Types: 1 Glasses of wine per week    Comment: rarely consumes     Social History     Substance and Sexual Activity   Drug Use No         Kumar Shanks MD  Internal Medicine Residency, PGY-3  [facility]

## 2021-11-05 ENCOUNTER — NEW PATIENT COMPREHENSIVE (OUTPATIENT)
Dept: URBAN - METROPOLITAN AREA CLINIC 6 | Facility: CLINIC | Age: 71
End: 2021-11-05

## 2021-11-05 DIAGNOSIS — H02.834: ICD-10-CM

## 2021-11-05 DIAGNOSIS — H02.831: ICD-10-CM

## 2021-11-05 DIAGNOSIS — H25.813: ICD-10-CM

## 2021-11-05 PROCEDURE — 1036F TOBACCO NON-USER: CPT

## 2021-11-05 PROCEDURE — 92015 DETERMINE REFRACTIVE STATE: CPT

## 2021-11-05 PROCEDURE — G8427 DOCREV CUR MEDS BY ELIG CLIN: HCPCS

## 2021-11-05 PROCEDURE — 92004 COMPRE OPH EXAM NEW PT 1/>: CPT

## 2021-11-05 ASSESSMENT — TONOMETRY
OD_IOP_MMHG: 8
OS_IOP_MMHG: 11

## 2021-11-05 ASSESSMENT — VISUAL ACUITY
OS_GLARE: 20/400-1
OS_CC: 20/60
OD_PH: 20/40
OD_GLARE: 20/100
OD_CC: 20/40
OS_PH: 20/40

## 2021-11-06 ENCOUNTER — IMMUNIZATIONS (OUTPATIENT)
Dept: FAMILY MEDICINE CLINIC | Facility: HOSPITAL | Age: 71
End: 2021-11-06

## 2021-11-06 DIAGNOSIS — Z23 ENCOUNTER FOR IMMUNIZATION: Primary | ICD-10-CM

## 2021-11-06 PROCEDURE — 91300 COVID-19 PFIZER VACC 0.3 ML: CPT

## 2021-11-06 PROCEDURE — 0001A COVID-19 PFIZER VACC 0.3 ML: CPT

## 2021-11-23 DIAGNOSIS — F41.9 ANXIETY: Primary | ICD-10-CM

## 2021-11-23 RX ORDER — LORAZEPAM 1 MG/1
1 TABLET ORAL DAILY PRN
Qty: 30 TABLET | Refills: 5 | Status: SHIPPED | OUTPATIENT
Start: 2021-11-23 | End: 2022-02-08 | Stop reason: SDUPTHER

## 2021-11-23 RX ORDER — LORAZEPAM 1 MG/1
1 TABLET ORAL DAILY PRN
COMMUNITY
End: 2021-11-23 | Stop reason: SDUPTHER

## 2021-11-23 NOTE — TELEPHONE ENCOUNTER
Patient on Lorazepam in the past   Discussed with Dr Isrrael Vieira when on discontinued and informed if she needs again to call office  Patient feels she needs script again  Please do PDMP and forward to provider for review      LOV:  9/23/2021  NOV:  2/8/2022

## 2021-12-08 ENCOUNTER — OFFICE VISIT (OUTPATIENT)
Dept: INTERNAL MEDICINE CLINIC | Age: 71
End: 2021-12-08
Payer: MEDICARE

## 2021-12-08 ENCOUNTER — TELEPHONE (OUTPATIENT)
Dept: INTERNAL MEDICINE CLINIC | Age: 71
End: 2021-12-08

## 2021-12-08 VITALS
DIASTOLIC BLOOD PRESSURE: 72 MMHG | OXYGEN SATURATION: 97 % | BODY MASS INDEX: 36.63 KG/M2 | HEIGHT: 61 IN | WEIGHT: 194 LBS | HEART RATE: 62 BPM | SYSTOLIC BLOOD PRESSURE: 118 MMHG | TEMPERATURE: 98 F

## 2021-12-08 DIAGNOSIS — F11.20 CONTINUOUS OPIOID DEPENDENCE (HCC): ICD-10-CM

## 2021-12-08 DIAGNOSIS — E78.00 HYPERCHOLESTEROLEMIA: ICD-10-CM

## 2021-12-08 DIAGNOSIS — B37.2 CUTANEOUS CANDIDIASIS: Primary | ICD-10-CM

## 2021-12-08 PROCEDURE — 99213 OFFICE O/P EST LOW 20 MIN: CPT | Performed by: PHYSICIAN ASSISTANT

## 2021-12-08 RX ORDER — FLUCONAZOLE 150 MG/1
TABLET ORAL
Qty: 2 TABLET | Refills: 0 | Status: SHIPPED | OUTPATIENT
Start: 2021-12-08 | End: 2021-12-11

## 2022-02-01 ENCOUNTER — RA CDI HCC (OUTPATIENT)
Dept: OTHER | Facility: HOSPITAL | Age: 72
End: 2022-02-01

## 2022-02-08 ENCOUNTER — OFFICE VISIT (OUTPATIENT)
Dept: INTERNAL MEDICINE CLINIC | Age: 72
End: 2022-02-08
Payer: MEDICARE

## 2022-02-08 VITALS
SYSTOLIC BLOOD PRESSURE: 120 MMHG | HEART RATE: 59 BPM | TEMPERATURE: 97.9 F | HEIGHT: 61 IN | BODY MASS INDEX: 35.87 KG/M2 | OXYGEN SATURATION: 97 % | DIASTOLIC BLOOD PRESSURE: 72 MMHG | WEIGHT: 190 LBS

## 2022-02-08 DIAGNOSIS — D69.6 THROMBOCYTOPENIA (HCC): ICD-10-CM

## 2022-02-08 DIAGNOSIS — E66.01 OBESITY, MORBID (HCC): ICD-10-CM

## 2022-02-08 DIAGNOSIS — F11.20 CONTINUOUS OPIOID DEPENDENCE (HCC): ICD-10-CM

## 2022-02-08 DIAGNOSIS — E78.00 HYPERCHOLESTEREMIA: ICD-10-CM

## 2022-02-08 DIAGNOSIS — Z00.00 MEDICARE ANNUAL WELLNESS VISIT, SUBSEQUENT: Primary | ICD-10-CM

## 2022-02-08 DIAGNOSIS — I10 HYPERTENSION, UNSPECIFIED TYPE: ICD-10-CM

## 2022-02-08 DIAGNOSIS — G47.00 INSOMNIA, UNSPECIFIED TYPE: ICD-10-CM

## 2022-02-08 DIAGNOSIS — M19.90 ARTHRITIS: ICD-10-CM

## 2022-02-08 DIAGNOSIS — M25.561 CHRONIC PAIN OF BOTH KNEES: ICD-10-CM

## 2022-02-08 DIAGNOSIS — F41.9 ANXIETY: ICD-10-CM

## 2022-02-08 DIAGNOSIS — M25.562 CHRONIC PAIN OF BOTH KNEES: ICD-10-CM

## 2022-02-08 DIAGNOSIS — G89.29 CHRONIC PAIN OF BOTH KNEES: ICD-10-CM

## 2022-02-08 PROCEDURE — 99214 OFFICE O/P EST MOD 30 MIN: CPT | Performed by: INTERNAL MEDICINE

## 2022-02-08 PROCEDURE — G0439 PPPS, SUBSEQ VISIT: HCPCS | Performed by: INTERNAL MEDICINE

## 2022-02-08 RX ORDER — LORAZEPAM 1 MG/1
1 TABLET ORAL DAILY PRN
Qty: 30 TABLET | Refills: 5 | Status: SHIPPED | OUTPATIENT
Start: 2022-02-08

## 2022-02-08 RX ORDER — EZETIMIBE 10 MG/1
10 TABLET ORAL DAILY
Qty: 90 TABLET | Refills: 1 | Status: SHIPPED | OUTPATIENT
Start: 2022-02-08 | End: 2022-06-14 | Stop reason: SDUPTHER

## 2022-02-08 RX ORDER — CEPHALEXIN 500 MG/1
CAPSULE ORAL
COMMUNITY
Start: 2021-12-28 | End: 2022-06-14 | Stop reason: SDUPTHER

## 2022-02-08 RX ORDER — ZOLPIDEM TARTRATE 5 MG/1
5 TABLET ORAL
Qty: 30 TABLET | Refills: 5 | Status: SHIPPED | OUTPATIENT
Start: 2022-02-08 | End: 2022-02-24 | Stop reason: SDUPTHER

## 2022-02-08 RX ORDER — NADOLOL 40 MG/1
40 TABLET ORAL DAILY
Qty: 90 TABLET | Refills: 1 | Status: SHIPPED | OUTPATIENT
Start: 2022-02-08

## 2022-02-08 RX ORDER — TRAMADOL HYDROCHLORIDE 50 MG/1
50 TABLET ORAL EVERY 8 HOURS PRN
Qty: 90 TABLET | Refills: 5 | Status: SHIPPED | OUTPATIENT
Start: 2022-02-08 | End: 2023-02-08

## 2022-02-08 RX ORDER — SIMVASTATIN 40 MG
40 TABLET ORAL DAILY
Qty: 90 TABLET | Refills: 1 | Status: SHIPPED | OUTPATIENT
Start: 2022-02-08 | End: 2022-06-14 | Stop reason: SDUPTHER

## 2022-02-08 RX ORDER — ESCITALOPRAM OXALATE 10 MG/1
10 TABLET ORAL DAILY
Qty: 90 TABLET | Refills: 1 | Status: SHIPPED | OUTPATIENT
Start: 2022-02-08 | End: 2022-06-14 | Stop reason: SDUPTHER

## 2022-02-08 RX ORDER — CEPHALEXIN 500 MG/1
CAPSULE ORAL
Status: CANCELLED | OUTPATIENT
Start: 2022-02-08

## 2022-02-08 NOTE — PROGRESS NOTES
Assessment/Plan:     Diagnoses and all orders for this visit:    Anxiety  -     escitalopram (LEXAPRO) 10 mg tablet; Take 1 tablet (10 mg total) by mouth daily  -     LORazepam (ATIVAN) 1 mg tablet; Take 1 tablet (1 mg total) by mouth daily as needed for anxiety    Hypercholesteremia  -     ezetimibe (ZETIA) 10 mg tablet; Take 1 tablet (10 mg total) by mouth daily  -     simvastatin (ZOCOR) 40 mg tablet; Take 1 tablet (40 mg total) by mouth daily  -     Comprehensive metabolic panel; Future  -     Lipid panel; Future  -     TSH, 3rd generation with Free T4 reflex; Future  -     UA w Reflex to Microscopic w Reflex to Culture    Hypertension, unspecified type  -     nadolol (CORGARD) 40 mg tablet; Take 1 tablet (40 mg total) by mouth daily  -     Comprehensive metabolic panel; Future    Thrombocytopenia (HCC)  -     traMADol (ULTRAM) 50 mg tablet; Take 1 tablet (50 mg total) by mouth every 8 (eight) hours as needed (prn)  -     CBC and differential; Future    Arthritis  -     traMADol (ULTRAM) 50 mg tablet; Take 1 tablet (50 mg total) by mouth every 8 (eight) hours as needed (prn)    Chronic pain of both knees  -     traMADol (ULTRAM) 50 mg tablet; Take 1 tablet (50 mg total) by mouth every 8 (eight) hours as needed (prn)    Insomnia, unspecified type  -     zolpidem (AMBIEN) 5 mg tablet; Take 1 tablet (5 mg total) by mouth daily at bedtime as needed (prn)    Continuous opioid dependence (HCC)    Obesity, morbid ([address] )    Other orders  -     cephalexin (KEFLEX) 500 mg capsule; TAKE 4 CAPSULES ONE HOUR BEFORE DENTAL WORK             M*SCRM software was used to dictate this note  It may contain errors with dictating incorrect words or incorrect spelling  Please contact the provider directly with any questions  Subjective:   Chief Complaint   Patient presents with    Medicare Wellness Visit    Follow-up     3 month f/u          BMI f/u        Patient ID: Luis Carlos Sampson is a 66 y o  female      HPI  This is a very pleasant 66 years young lady who is here today for the regular follow-up the only big problem is the problem with sleep and insomnia she can initiate the sleep but she cannot maintain and then when she wake up she gets all kind of ideas in her brain and can not fall asleep again she is under the stress right now because of the loss of her mom and also she has renovating the house in maybe moving into the new house  Hypertension is very well controlled  She lost some weight  Overall she is very active and doing all her ADLs  Anxiety and depression is better  Insomnia is a chronic problem with her which is the being treated with the multiple medications  The following portions of the patient's history were reviewed and updated as appropriate: allergies, current medications, past family history, past medical history, past social history, past surgical history and problem list     Review of Systems   Constitutional: Positive for fatigue  Negative for chills  HENT: Negative for congestion, ear pain, hearing loss, postnasal drip, sinus pressure, sore throat and voice change  Eyes: Negative for pain, discharge and visual disturbance  Respiratory: Negative for cough, chest tightness and shortness of breath  Cardiovascular: Negative for chest pain, palpitations and leg swelling  Gastrointestinal: Negative for abdominal pain, blood in stool, diarrhea, nausea and rectal pain  Genitourinary: Positive for frequency  Negative for difficulty urinating, dysuria and urgency  Urge incontinence   Musculoskeletal: Negative for arthralgias and joint swelling  Skin: Negative for rash  Allergic/Immunologic: Negative for environmental allergies and food allergies  Neurological: Negative for dizziness, tremors, weakness, numbness and headaches  Hematological: Negative for adenopathy  Psychiatric/Behavioral: Positive for sleep disturbance  Negative for behavioral problems and hallucinations   The patient is nervous/anxious  Past Medical History:   Diagnosis Date    Anxiety     last assessed 02/18/14    Arthritis     Depression     Endometriosis     Generalized osteoarthritis of multiple sites     last assessed 02/18/14    HL (hearing loss) (    Hyperlipidemia     Hypertension     Left knee pain     Major depression, single episode     mental status change    Pelvic adhesions     Squamous cell skin cancer     resolved 09/2007; back         Current Outpatient Medications:     cephalexin (KEFLEX) 500 mg capsule, TAKE 4 CAPSULES ONE HOUR BEFORE DENTAL WORK, Disp: , Rfl:     escitalopram (LEXAPRO) 10 mg tablet, Take 1 tablet (10 mg total) by mouth daily, Disp: 90 tablet, Rfl: 1    ezetimibe (ZETIA) 10 mg tablet, Take 1 tablet (10 mg total) by mouth daily, Disp: 90 tablet, Rfl: 1    LORazepam (ATIVAN) 1 mg tablet, Take 1 tablet (1 mg total) by mouth daily as needed for anxiety, Disp: 30 tablet, Rfl: 5    nadolol (CORGARD) 40 mg tablet, Take 1 tablet (40 mg total) by mouth daily, Disp: 90 tablet, Rfl: 1    simvastatin (ZOCOR) 40 mg tablet, Take 1 tablet (40 mg total) by mouth daily, Disp: 90 tablet, Rfl: 1    traMADol (ULTRAM) 50 mg tablet, Take 1 tablet (50 mg total) by mouth every 8 (eight) hours as needed (prn), Disp: 90 tablet, Rfl: 5    zolpidem (AMBIEN) 5 mg tablet, Take 1 tablet (5 mg total) by mouth daily at bedtime as needed (prn), Disp: 30 tablet, Rfl: 5    naloxone (NARCAN) 4 mg/0 1 mL nasal spray, Administer 1 spray into a nostril  If no response after 2-3 minutes, give another dose in the other nostril using a new spray   (Patient not taking: Reported on 2/8/2022 ), Disp: 1 each, Rfl: 1    Allergies   Allergen Reactions    Aspirin GI Intolerance    Codeine Nausea Only, GI Intolerance and Vomiting     Derivatives    Oxycodone-Acetaminophen Vomiting and GI Intolerance     darvocet  Percocet Tabs       Social History   Past Surgical History:   Procedure Laterality Date    APPENDECTOMY      BREAST BIOPSY Left 07/01/2005    stereotactic; percutaneous needle core use imag guide    BREAST EXCISIONAL BIOPSY Left     CHOLECYSTECTOMY      COLONOSCOPY      PILONIDAL CYST EXCISION      TOTAL ABDOMINAL HYSTERECTOMY Bilateral 1994    age 38   [de-identified] TOTAL ABDOMINAL HYSTERECTOMY W/ BILATERAL SALPINGOOPHORECTOMY Bilateral 1994    age 38     Family History   Problem Relation Age of Onset    Breast cancer Mother 61        lumpectomy    Hypertension Mother     Thyroid disease Mother     Cancer Mother 80        abdominal mass    Lung cancer Father     Lung cancer Paternal [de-identified]     Esophageal cancer Paternal Aunt     Kidney nephrosis Son     Hypertension Son     No Known Problems Maternal Grandmother     No Known Problems Maternal Grandfather     Dementia Paternal Grandmother     Leukemia Paternal Grandfather     No Known Problems Son     Breast cancer Maternal Aunt 79    No Known Problems Maternal Aunt     No Known Problems Maternal Aunt        Objective:  /72 (BP Location: Left arm, Patient Position: Sitting, Cuff Size: Large)   Pulse 59   Temp 97 9 °F (36 6 °C) (Temporal)   Ht 5' 1" (1 549 m)   Wt 86 2 kg (190 lb)   SpO2 97%   BMI 35 90 kg/m²        Physical Exam  Constitutional:       Appearance: She is well-developed  She is obese  HENT:      Head: Normocephalic and atraumatic  Right Ear: External ear normal       Nose: Nose normal    Eyes:      Conjunctiva/sclera: Conjunctivae normal       Pupils: Pupils are equal, round, and reactive to light  Neck:      Thyroid: No thyromegaly  Vascular: No JVD  Cardiovascular:      Rate and Rhythm: Normal rate and regular rhythm  Heart sounds: Normal heart sounds  Pulmonary:      Breath sounds: Normal breath sounds  Abdominal:      General: Bowel sounds are normal       Palpations: Abdomen is soft  Musculoskeletal:         General: Normal range of motion  Cervical back: Normal range of motion  Lymphadenopathy:      Cervical: No cervical adenopathy  Skin:     General: Skin is dry  Neurological:      Mental Status: She is alert and oriented to person, place, and time  Deep Tendon Reflexes: Reflexes are normal and symmetric     Psychiatric:         Behavior: Behavior normal

## 2022-02-08 NOTE — PROGRESS NOTES
Assessment and Plan:     Problem List Items Addressed This Visit        Other    Insomnia    Thrombocytopenia ([address] )      Other Visit Diagnoses     Anxiety        Hypercholesteremia        Hypertension, unspecified type        Arthritis        Chronic pain of both knees            BMI Counseling: Body mass index is 35 9 kg/m²  The BMI is above normal  Nutrition recommendations include decreasing portion sizes, encouraging healthy choices of fruits and vegetables and moderation in carbohydrate intake  Exercise recommendations include moderate physical activity 150 minutes/week  Rationale for BMI follow-up plan is due to patient being overweight or obese  Preventive health issues were discussed with patient, and age appropriate screening tests were ordered as noted in patient's After Visit Summary  Personalized health advice and appropriate referrals for health education or preventive services given if needed, as noted in patient's After Visit Summary       History of Present Illness:     Patient presents for Medicare Annual Wellness visit    Patient Care Team:  Ozize Bai MD as PCP - General     Problem List:     Patient Active Problem List   Diagnosis    Benign essential hypertension    Depression    Hypercholesterolemia    Impaired fasting glucose    Insomnia    Thrombocytopenia (HCC)    Hypocalcemia    Obesity, morbid ([address] )    Continuous opioid dependence ([address] )      Past Medical and Surgical History:     Past Medical History:   Diagnosis Date    Anxiety     last assessed 02/18/14    Arthritis     Depression     Endometriosis     Generalized osteoarthritis of multiple sites     last assessed 02/18/14    HL (hearing loss) (    Hyperlipidemia     Hypertension     Left knee pain     Major depression, single episode     mental status change    Pelvic adhesions     Squamous cell skin cancer     resolved 09/2007; back     Past Surgical History:   Procedure Laterality Date    APPENDECTOMY  BREAST BIOPSY Left 07/01/2005    stereotactic; percutaneous needle core use imag guide    BREAST EXCISIONAL BIOPSY Left     CHOLECYSTECTOMY      COLONOSCOPY      PILONIDAL CYST EXCISION      TOTAL ABDOMINAL HYSTERECTOMY Bilateral 1994    age 38   Janice Daniel TOTAL ABDOMINAL HYSTERECTOMY W/ BILATERAL SALPINGOOPHORECTOMY Bilateral 1994    age 38      Family History:     Family History   Problem Relation Age of Onset   Janice Daniel Breast cancer Mother 61        lumpectomy    Hypertension Mother     Thyroid disease Mother    Janice Daniel Cancer Mother 80        abdominal mass    Lung cancer Father     Lung cancer Paternal [de-identified]     Esophageal cancer Paternal Aunt    Janice Daniel Kidney nephrosis Son     Hypertension Son     No Known Problems Maternal Grandmother     No Known Problems Maternal Grandfather     Dementia Paternal Grandmother     Leukemia Paternal Grandfather     No Known Problems Son     Breast cancer Maternal Aunt 79    No Known Problems Maternal Aunt     No Known Problems Maternal Aunt       Social History:     Social History     Socioeconomic History    Marital status: /Civil Union     Spouse name: None    Number of children: 2    Years of education: None    Highest education level: None   Occupational History    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   Tobacco Use    Smoking status: Never Smoker    Smokeless tobacco: Never Used   Vaping Use    Vaping Use: Never used   Substance and Sexual Activity    Alcohol use:  Yes     Alcohol/week: 1 0 standard drink     Types: 1 Glasses of wine per week     Comment: rarely consumes    Drug use: No    Sexual activity: Yes     Partners: Male     Birth control/protection: Surgical   Other Topics Concern    None   Social History Narrative    Always uses seat belt    Caffeine use    Daily coffee consumption (2 cups/day)    Exercise habits: Walking 5x per week    Feels safe at home    Lives with spouse     Social Determinants of Health     Financial Resource Strain: Not on file   Food Insecurity: Not on file   Transportation Needs: Not on file   Physical Activity: Not on file   Stress: Not on file   Social Connections: Not on file   Intimate Partner Violence: Not on file   Housing Stability: Not on file      Medications and Allergies:     Current Outpatient Medications   Medication Sig Dispense Refill    cephalexin (KEFLEX) 500 mg capsule TAKE 4 CAPSULES ONE HOUR BEFORE DENTAL WORK      escitalopram (LEXAPRO) 10 mg tablet Take 1 tablet (10 mg total) by mouth daily 90 tablet 1    ezetimibe (ZETIA) 10 mg tablet Take 1 tablet (10 mg total) by mouth daily 90 tablet 1    LORazepam (ATIVAN) 1 mg tablet Take 1 tablet (1 mg total) by mouth daily as needed for anxiety 30 tablet 5    nadolol (CORGARD) 40 mg tablet Take 1 tablet (40 mg total) by mouth daily 90 tablet 1    simvastatin (ZOCOR) 40 mg tablet Take 1 tablet (40 mg total) by mouth daily 90 tablet 1    traMADol (ULTRAM) 50 mg tablet Take 1 tablet (50 mg total) by mouth every 8 (eight) hours as needed (prn) 90 tablet 5    zolpidem (AMBIEN) 5 mg tablet Take 1 tablet (5 mg total) by mouth daily at bedtime as needed (prn) 30 tablet 5    naloxone (NARCAN) 4 mg/0 1 mL nasal spray Administer 1 spray into a nostril  If no response after 2-3 minutes, give another dose in the other nostril using a new spray  (Patient not taking: Reported on 2/8/2022 ) 1 each 1     No current facility-administered medications for this visit       Allergies   Allergen Reactions    Aspirin GI Intolerance    Codeine Nausea Only, GI Intolerance and Vomiting     Derivatives    Oxycodone-Acetaminophen Vomiting and GI Intolerance     darvocet  Percocet Tabs      Immunizations:     Immunization History   Administered Date(s) Administered    COVID-19 PFIZER VACCINE 0 3 ML IM 03/05/2021, 03/24/2021, 11/06/2021    H1N1, All Formulations 01/13/2010    INFLUENZA 11/28/2006, 10/19/2007, 09/28/2018, 10/08/2019, 09/28/2020, 09/30/2021    Influenza Split High Dose Preservative Free IM 09/29/2014, 10/26/2015, 10/08/2019    Influenza, high dose seasonal 0 7 mL 10/08/2019, 09/28/2020    Influenza, seasonal, injectable 1949, 11/19/2012, 10/06/2016, 10/16/2017    Pneumococcal Conjugate 13-Valent 09/28/2018    Pneumococcal Polysaccharide PPV23 06/13/2014      Health Maintenance:         Topic Date Due    Breast Cancer Screening: Mammogram  06/04/2022    Colorectal Cancer Screening  08/04/2026    Hepatitis C Screening  Completed         Topic Date Due    DTaP,Tdap,and Td Vaccines (1 - Tdap) Never done      Medicare Health Risk Assessment:     There were no vitals taken for this visit  Orquidea De Paz is here for her Subsequent Wellness visit  Last Medicare Wellness visit information reviewed, patient interviewed and updates made to the record today  Health Risk Assessment:   Patient rates overall health as very good  Patient feels that their physical health rating is slightly better  Patient is satisfied with their life  Eyesight was rated as slightly worse  Hearing was rated as same  Patient feels that their emotional and mental health rating is slightly better  Patients states they are sometimes angry  Patient states they are sometimes unusually tired/fatigued  Pain experienced in the last 7 days has been some  Patient's pain rating has been 2/10  Patient states that she has experienced no weight loss or gain in last 6 months  Fall Risk Screening: In the past year, patient has experienced: no history of falling in past year      Urinary Incontinence Screening:   Patient has leaked urine accidently in the last six months  Home Safety:  Patient does not have trouble with stairs inside or outside of their home  Patient has working smoke alarms and has working carbon monoxide detector  Home safety hazards include: none  Nutrition:   Current diet is Regular       Medications:   Patient is not currently taking any over-the-counter supplements  Patient is able to manage medications  Activities of Daily Living (ADLs)/Instrumental Activities of Daily Living (IADLs):   Walk and transfer into and out of bed and chair?: Yes  Dress and groom yourself?: Yes    Bathe or shower yourself?: Yes    Feed yourself? Yes  Do your laundry/housekeeping?: Yes  Manage your money, pay your bills and track your expenses?: Yes  Make your own meals?: Yes    Do your own shopping?: Yes    Previous Hospitalizations:   Any hospitalizations or ED visits within the last 12 months?: No      Advance Care Planning:   Living will: No    Durable POA for healthcare: Yes    Advanced directive: Yes    Advanced directive counseling given: Yes      Cognitive Screening:   Provider or family/friend/caregiver concerned regarding cognition?: No    PREVENTIVE SCREENINGS      Cardiovascular Screening:    General: Screening Not Indicated and History Lipid Disorder      Diabetes Screening:     General: Screening Current      Colorectal Cancer Screening:     General: Screening Current      Breast Cancer Screening:     General: Screening Current      Cervical Cancer Screening:    General: Screening Not Indicated      Osteoporosis Screening:    General: Screening Current      Abdominal Aortic Aneurysm (AAA) Screening:        General: Screening Current      Lung Cancer Screening:     General: Screening Not Indicated      Hepatitis C Screening:    General: Screening Current    Review of Current Opioid Use    Opioid Risk Tool (ORT) Interpretation: Complete Opioid Risk Tool (ORT)    Other Counseling Topics:   Calcium and vitamin D intake and regular weightbearing exercise         Kiran Mendoza MD

## 2022-02-08 NOTE — PATIENT INSTRUCTIONS
Medicare Preventive Visit Patient Instructions  Thank you for completing your Welcome to Medicare Visit or Medicare Annual Wellness Visit today  Your next wellness visit will be due in one year (2/9/2023)  The screening/preventive services that you may require over the next 5-10 years are detailed below  Some tests may not apply to you based off risk factors and/or age  Screening tests ordered at today's visit but not completed yet may show as past due  Also, please note that scanned in results may not display below  Preventive Screenings:  Service Recommendations Previous Testing/Comments   Colorectal Cancer Screening  * Colonoscopy    * Fecal Occult Blood Test (FOBT)/Fecal Immunochemical Test (FIT)  * Fecal DNA/Cologuard Test  * Flexible Sigmoidoscopy Age: 54-65 years old   Colonoscopy: every 10 years (may be performed more frequently if at higher risk)  OR  FOBT/FIT: every 1 year  OR  Cologuard: every 3 years  OR  Sigmoidoscopy: every 5 years  Screening may be recommended earlier than age 48 if at higher risk for colorectal cancer  Also, an individualized decision between you and your healthcare provider will decide whether screening between the ages of 74-80 would be appropriate  Colonoscopy: 08/05/2021  FOBT/FIT: Not on file  Cologuard: 05/19/2021  Sigmoidoscopy: Not on file    Screening Current     Breast Cancer Screening Age: 36 years old  Frequency: every 1-2 years  Not required if history of left and right mastectomy Mammogram: 06/04/2021    Screening Current   Cervical Cancer Screening Between the ages of 21-29, pap smear recommended once every 3 years  Between the ages of 33-67, can perform pap smear with HPV co-testing every 5 years     Recommendations may differ for women with a history of total hysterectomy, cervical cancer, or abnormal pap smears in past  Pap Smear: 01/18/2019    Screening Not Indicated   Hepatitis C Screening Once for adults born between 1945 and 1965  More frequently in patients at high risk for Hepatitis C Hep C Antibody: 12/18/2019    Screening Current   Diabetes Screening 1-2 times per year if you're at risk for diabetes or have pre-diabetes Fasting glucose: 104 mg/dL   A1C: 5 3 %    Screening Current   Cholesterol Screening Once every 5 years if you don't have a lipid disorder  May order more often based on risk factors  Lipid panel: 05/18/2021    Screening Not Indicated  History Lipid Disorder     Other Preventive Screenings Covered by Medicare:  1  Abdominal Aortic Aneurysm (AAA) Screening: covered once if your at risk  You're considered to be at risk if you have a family history of AAA  2  Lung Cancer Screening: covers low dose CT scan once per year if you meet all of the following conditions: (1) Age 50-69; (2) No signs or symptoms of lung cancer; (3) Current smoker or have quit smoking within the last 15 years; (4) You have a tobacco smoking history of at least 30 pack years (packs per day multiplied by number of years you smoked); (5) You get a written order from a healthcare provider  3  Glaucoma Screening: covered annually if you're considered high risk: (1) You have diabetes OR (2) Family history of glaucoma OR (3)  aged 48 and older OR (3)  American aged 72 and older  3  Osteoporosis Screening: covered every 2 years if you meet one of the following conditions: (1) You're estrogen deficient and at risk for osteoporosis based off medical history and other findings; (2) Have a vertebral abnormality; (3) On glucocorticoid therapy for more than 3 months; (4) Have primary hyperparathyroidism; (5) On osteoporosis medications and need to assess response to drug therapy  · Last bone density test (DXA Scan): 11/04/2010   5  HIV Screening: covered annually if you're between the age of 15-65  Also covered annually if you are younger than 13 and older than 72 with risk factors for HIV infection   For pregnant patients, it is covered up to 3 times per pregnancy  Immunizations:  Immunization Recommendations   Influenza Vaccine Annual influenza vaccination during flu season is recommended for all persons aged >= 6 months who do not have contraindications   Pneumococcal Vaccine (Prevnar and Pneumovax)  * Prevnar = PCV13  * Pneumovax = PPSV23   Adults 25-60 years old: 1-3 doses may be recommended based on certain risk factors  Adults 72 years old: Prevnar (PCV13) vaccine recommended followed by Pneumovax (PPSV23) vaccine  If already received PPSV23 since turning 65, then PCV13 recommended at least one year after PPSV23 dose  Hepatitis B Vaccine 3 dose series if at intermediate or high risk (ex: diabetes, end stage renal disease, liver disease)   Tetanus (Td) Vaccine - COST NOT COVERED BY MEDICARE PART B Following completion of primary series, a booster dose should be given every 10 years to maintain immunity against tetanus  Td may also be given as tetanus wound prophylaxis  Tdap Vaccine - COST NOT COVERED BY MEDICARE PART B Recommended at least once for all adults  For pregnant patients, recommended with each pregnancy  Shingles Vaccine (Shingrix) - COST NOT COVERED BY MEDICARE PART B  2 shot series recommended in those aged 48 and above     Health Maintenance Due:      Topic Date Due    Breast Cancer Screening: Mammogram  06/04/2022    Colorectal Cancer Screening  08/04/2026    Hepatitis C Screening  Completed     Immunizations Due:      Topic Date Due    DTaP,Tdap,and Td Vaccines (1 - Tdap) Never done     Advance Directives   What are advance directives? Advance directives are legal documents that state your wishes and plans for medical care  These plans are made ahead of time in case you lose your ability to make decisions for yourself  Advance directives can apply to any medical decision, such as the treatments you want, and if you want to donate organs  What are the types of advance directives?   There are many types of advance directives, and each state has rules about how to use them  You may choose a combination of any of the following:  · Living will: This is a written record of the treatment you want  You can also choose which treatments you do not want, which to limit, and which to stop at a certain time  This includes surgery, medicine, IV fluid, and tube feedings  · Durable power of  for healthcare [place]): This is a written record that states who you want to make healthcare choices for you when you are unable to make them for yourself  This person, called a proxy, is usually a family member or a friend  You may choose more than 1 proxy  · Do not resuscitate (DNR) order:  A DNR order is used in case your heart stops beating or you stop breathing  It is a request not to have certain forms of treatment, such as CPR  A DNR order may be included in other types of advance directives  · Medical directive: This covers the care that you want if you are in a coma, near death, or unable to make decisions for yourself  You can list the treatments you want for each condition  Treatment may include pain medicine, surgery, blood transfusions, dialysis, IV or tube feedings, and a ventilator (breathing machine)  · Values history: This document has questions about your views, beliefs, and how you feel and think about life  This information can help others choose the care that you would choose  Why are advance directives important? An advance directive helps you control your care  Although spoken wishes may be used, it is better to have your wishes written down  Spoken wishes can be misunderstood, or not followed  Treatments may be given even if you do not want them  An advance directive may make it easier for your family to make difficult choices about your care  Urinary Incontinence   Urinary incontinence (UI)  is when you lose control of your bladder  UI develops because your bladder cannot store or empty urine properly   The 3 most common types of UI are stress incontinence, urge incontinence, or both  Medicines:   · May be given to help strengthen your bladder control  Report any side effects of medication to your healthcare provider  Do pelvic muscle exercises often:  Your pelvic muscles help you stop urinating  Squeeze these muscles tight for 5 seconds, then relax for 5 seconds  Gradually work up to squeezing for 10 seconds  Do 3 sets of 15 repetitions a day, or as directed  This will help strengthen your pelvic muscles and improve bladder control  Train your bladder:  Go to the bathroom at set times, such as every 2 hours, even if you do not feel the urge to go  You can also try to hold your urine when you feel the urge to go  For example, hold your urine for 5 minutes when you feel the urge to go  As that becomes easier, hold your urine for 10 minutes  Self-care:   · Keep a UI record  Write down how often you leak urine and how much you leak  Make a note of what you were doing when you leaked urine  · Drink liquids as directed  You may need to limit the amount of liquid you drink to help control your urine leakage  Do not drink any liquid right before you go to bed  Limit or do not have drinks that contain caffeine or alcohol  · Prevent constipation  Eat a variety of high-fiber foods  Good examples are high-fiber cereals, beans, vegetables, and whole-grain breads  Walking is the best way to trigger your intestines to have a bowel movement  · Exercise regularly and maintain a healthy weight  Weight loss and exercise will decrease pressure on your bladder and help you control your leakage  · Use a catheter as directed  to help empty your bladder  A catheter is a tiny, plastic tube that is put into your bladder to drain your urine  · Go to behavior therapy as directed  Behavior therapy may be used to help you learn to control your urge to urinate      Weight Management   Why it is important to manage your weight:  Being overweight increases your risk of health conditions such as heart disease, high blood pressure, type 2 diabetes, and certain types of cancer  It can also increase your risk for osteoarthritis, sleep apnea, and other respiratory problems  Aim for a slow, steady weight loss  Even a small amount of weight loss can lower your risk of health problems  How to lose weight safely:  A safe and healthy way to lose weight is to eat fewer calories and get regular exercise  You can lose up about 1 pound a week by decreasing the number of calories you eat by 500 calories each day  Healthy meal plan for weight management:  A healthy meal plan includes a variety of foods, contains fewer calories, and helps you stay healthy  A healthy meal plan includes the following:  · Eat whole-grain foods more often  A healthy meal plan should contain fiber  Fiber is the part of grains, fruits, and vegetables that is not broken down by your body  Whole-grain foods are healthy and provide extra fiber in your diet  Some examples of whole-grain foods are whole-wheat breads and pastas, oatmeal, brown rice, and bulgur  · Eat a variety of vegetables every day  Include dark, leafy greens such as spinach, kale, damien greens, and mustard greens  Eat yellow and orange vegetables such as carrots, sweet potatoes, and winter squash  · Eat a variety of fruits every day  Choose fresh or canned fruit (canned in its own juice or light syrup) instead of juice  Fruit juice has very little or no fiber  · Eat low-fat dairy foods  Drink fat-free (skim) milk or 1% milk  Eat fat-free yogurt and low-fat cottage cheese  Try low-fat cheeses such as mozzarella and other reduced-fat cheeses  · Choose meat and other protein foods that are low in fat  Choose beans or other legumes such as split peas or lentils  Choose fish, skinless poultry (chicken or turkey), or lean cuts of red meat (beef or pork)  Before you cook meat or poultry, cut off any visible fat     · Use less fat and oil  Try baking foods instead of frying them  Add less fat, such as margarine, sour cream, regular salad dressing and mayonnaise to foods  Eat fewer high-fat foods  Some examples of high-fat foods include french fries, doughnuts, ice cream, and cakes  · Eat fewer sweets  Limit foods and drinks that are high in sugar  This includes candy, cookies, regular soda, and sweetened drinks  Exercise:  Exercise at least 30 minutes per day on most days of the week  Some examples of exercise include walking, biking, dancing, and swimming  You can also fit in more physical activity by taking the stairs instead of the elevator or parking farther away from stores  Ask your healthcare provider about the best exercise plan for you  Narcotic (Opioid) Safety    Use narcotics safely:  · Take prescribed narcotics exactly as directed  · Do not give narcotics to others or take narcotics that belong to someone else  · Do not mix narcotics without medicines or alcohol  · Do not drive or operate heavy machinery after you take the narcotic  · Monitor for side effects and notify your healthcare provider if you experienced side effects such as nausea, sleepiness, itching, or trouble thinking clearly  Manage constipation:    Constipation is the most common side effect of narcotic medicine  Constipation is when you have hard, dry bowel movements, or you go longer than usual between bowel movements  Tell your healthcare provider about all changes in your bowel movements while you are taking narcotics  He or she may recommend laxative medicine to help you have a bowel movement  He or she may also change the kind of narcotic you are taking, or change when you take it  The following are more ways you can prevent or relieve constipation:    · Drink liquids as directed  You may need to drink extra liquids to help soften and move your bowels  Ask how much liquid to drink each day and which liquids are best for you    · Eat high-fiber foods   This may help decrease constipation by adding bulk to your bowel movements  High-fiber foods include fruits, vegetables, whole-grain breads and cereals, and beans  Your healthcare provider or dietitian can help you create a high-fiber meal plan  Your provider may also recommend a fiber supplement if you cannot get enough fiber from food  · Exercise regularly  Regular physical activity can help stimulate your intestines  Walking is a good exercise to prevent or relieve constipation  Ask which exercises are best for you  · Schedule a time each day to have a bowel movement  This may help train your body to have regular bowel movements  Bend forward while you are on the toilet to help move the bowel movement out  Sit on the toilet for at least 10 minutes, even if you do not have a bowel movement  Store narcotics safely:   · Store narcotics where others cannot easily get them  Keep them in a locked cabinet or secure area  Do not  keep them in a purse or other bag you carry with you  A person may be looking for something else and find the narcotics  · Make sure narcotics are stored out of the reach of children  A child can easily overdose on narcotics  Narcotics may look like candy to a small child  The best way to dispose of narcotics: The laws vary by country and area  In the [place], the best way is to return the narcotics through a take-back program  This program is offered by the [employer] ([employer])  The following are options for using the program:  · Take the narcotics to a [facility] collection site  The site is often a law enforcement center  Call your local law enforcement center for scheduled take-back days in your area  You will be given information on where to go if the collection site is in a different location  · Take the narcotics to an approved pharmacy or hospital   A pharmacy or hospital may be set up as a collection site   You will need to ask if it is a [facility] collection site if you were not directed there  A pharmacy or doctor's office may not be able to take back narcotics unless it is a [facility] site  · Use a mail-back system  This means you are given containers to put the narcotics into  You will then mail them in the containers  · Use a take-back drop box  This is a place to leave the narcotics at any time  People and animals will not be able to get into the box  Your local law enforcement agency can tell you where to find a drop box in your area  Other ways to manage pain:   · Ask your healthcare provider about non-narcotic medicines to control pain  Nonprescription medicines include NSAIDs (such as ibuprofen) and acetaminophen  Prescription medicines include muscle relaxers, antidepressants, and steroids  · Pain may be managed without any medicines  Some ways to relieve pain include massage, aromatherapy, or meditation  Physical or occupational therapy may also help  For more information:   · Drug Enforcement Administration  [address] [place] [address] [address]  Phone: 5- 193 - [phone]  Web Address: [facility]/drug_disposal/    · [address]  and Drug Administration  [place] [facility] [address] [facility]  Phone: 5- 566 - 426-6032  Web Address: http://Channel M/     © Copyright [employer] 2018 Information is for End User's use only and may not be sold, redistributed or otherwise used for commercial purposes   All illustrations and images included in CareNotes® are the copyrighted property of [employer]  or [address] [place] [address]

## 2022-02-24 DIAGNOSIS — G47.00 INSOMNIA, UNSPECIFIED TYPE: ICD-10-CM

## 2022-02-28 RX ORDER — ZOLPIDEM TARTRATE 5 MG/1
5 TABLET ORAL
Qty: 30 TABLET | Refills: 5 | Status: SHIPPED | OUTPATIENT
Start: 2022-02-28

## 2022-06-06 ENCOUNTER — HOSPITAL ENCOUNTER (OUTPATIENT)
Dept: RADIOLOGY | Age: 72
Discharge: HOME | End: 2022-06-06
Payer: MEDICARE

## 2022-06-06 VITALS — WEIGHT: 190 LBS | HEIGHT: 61 IN | BODY MASS INDEX: 35.87 KG/M2

## 2022-06-06 DIAGNOSIS — Z12.31 ENCOUNTER FOR SCREENING MAMMOGRAM FOR MALIGNANT NEOPLASM OF BREAST: ICD-10-CM

## 2022-06-06 PROCEDURE — 77067 SCR MAMMO BI INCL CAD: CPT

## 2022-06-06 PROCEDURE — 77063 BREAST TOMOSYNTHESIS BI: CPT

## 2022-06-08 ENCOUNTER — APPOINTMENT (OUTPATIENT)
Dept: LAB | Age: 72
End: 2022-06-08
Payer: MEDICARE

## 2022-06-08 DIAGNOSIS — D69.6 THROMBOCYTOPENIA (HCC): ICD-10-CM

## 2022-06-08 DIAGNOSIS — E78.00 HYPERCHOLESTEREMIA: ICD-10-CM

## 2022-06-08 LAB
BASOPHILS # BLD AUTO: 0.03 THOUSANDS/ΜL (ref 0–0.1)
BASOPHILS NFR BLD AUTO: 1 % (ref 0–1)
BILIRUB UR QL STRIP: NEGATIVE
CLARITY UR: CLEAR
COLOR UR: NORMAL
EOSINOPHIL # BLD AUTO: 0.1 THOUSAND/ΜL (ref 0–0.61)
EOSINOPHIL NFR BLD AUTO: 2 % (ref 0–6)
ERYTHROCYTE [DISTWIDTH] IN BLOOD BY AUTOMATED COUNT: 13.5 % (ref 11.6–15.1)
GLUCOSE UR STRIP-MCNC: NEGATIVE MG/DL
HCT VFR BLD AUTO: 39.4 % (ref 34.8–46.1)
HGB BLD-MCNC: 13.1 G/DL (ref 11.5–15.4)
HGB UR QL STRIP.AUTO: NEGATIVE
IMM GRANULOCYTES # BLD AUTO: 0.01 THOUSAND/UL (ref 0–0.2)
IMM GRANULOCYTES NFR BLD AUTO: 0 % (ref 0–2)
KETONES UR STRIP-MCNC: NEGATIVE MG/DL
LEUKOCYTE ESTERASE UR QL STRIP: NEGATIVE
LYMPHOCYTES # BLD AUTO: 1.81 THOUSANDS/ΜL (ref 0.6–4.47)
LYMPHOCYTES NFR BLD AUTO: 35 % (ref 14–44)
MCH RBC QN AUTO: 28.9 PG (ref 26.8–34.3)
MCHC RBC AUTO-ENTMCNC: 33.2 G/DL (ref 31.4–37.4)
MCV RBC AUTO: 87 FL (ref 82–98)
MONOCYTES # BLD AUTO: 0.37 THOUSAND/ΜL (ref 0.17–1.22)
MONOCYTES NFR BLD AUTO: 7 % (ref 4–12)
NEUTROPHILS # BLD AUTO: 2.85 THOUSANDS/ΜL (ref 1.85–7.62)
NEUTS SEG NFR BLD AUTO: 55 % (ref 43–75)
NITRITE UR QL STRIP: NEGATIVE
NRBC BLD AUTO-RTO: 0 /100 WBCS
PH UR STRIP.AUTO: 7 [PH]
PLATELET # BLD AUTO: 127 THOUSANDS/UL (ref 149–390)
PMV BLD AUTO: 11.3 FL (ref 8.9–12.7)
PROT UR STRIP-MCNC: NEGATIVE MG/DL
RBC # BLD AUTO: 4.53 MILLION/UL (ref 3.81–5.12)
SP GR UR STRIP.AUTO: 1.01 (ref 1–1.03)
TSH SERPL DL<=0.05 MIU/L-ACNC: 2.31 UIU/ML (ref 0.45–4.5)
UROBILINOGEN UR STRIP-ACNC: <2 MG/DL
WBC # BLD AUTO: 5.17 THOUSAND/UL (ref 4.31–10.16)

## 2022-06-08 PROCEDURE — 36415 COLL VENOUS BLD VENIPUNCTURE: CPT

## 2022-06-08 PROCEDURE — 84443 ASSAY THYROID STIM HORMONE: CPT

## 2022-06-08 PROCEDURE — 81003 URINALYSIS AUTO W/O SCOPE: CPT | Performed by: INTERNAL MEDICINE

## 2022-06-08 PROCEDURE — 85025 COMPLETE CBC W/AUTO DIFF WBC: CPT

## 2022-06-14 ENCOUNTER — OFFICE VISIT (OUTPATIENT)
Dept: INTERNAL MEDICINE CLINIC | Age: 72
End: 2022-06-14
Payer: MEDICARE

## 2022-06-14 VITALS
SYSTOLIC BLOOD PRESSURE: 152 MMHG | WEIGHT: 189.3 LBS | OXYGEN SATURATION: 97 % | HEIGHT: 61 IN | DIASTOLIC BLOOD PRESSURE: 78 MMHG | BODY MASS INDEX: 35.74 KG/M2 | TEMPERATURE: 98 F | HEART RATE: 90 BPM

## 2022-06-14 DIAGNOSIS — E78.00 HYPERCHOLESTEREMIA: ICD-10-CM

## 2022-06-14 DIAGNOSIS — D69.6 THROMBOCYTOPENIA (HCC): Primary | ICD-10-CM

## 2022-06-14 DIAGNOSIS — E66.01 OBESITY, MORBID (HCC): ICD-10-CM

## 2022-06-14 DIAGNOSIS — Z96.652 STATUS POST TOTAL LEFT KNEE REPLACEMENT: ICD-10-CM

## 2022-06-14 DIAGNOSIS — Z20.818 EXPOSURE TO GROUP B STREPTOCOCCUS WITH INADEQUATE INTRAPARTUM ANTIBIOTIC PROPHYLAXIS: ICD-10-CM

## 2022-06-14 DIAGNOSIS — F41.9 ANXIETY: ICD-10-CM

## 2022-06-14 DIAGNOSIS — G89.29 CHRONIC BILATERAL LOW BACK PAIN WITHOUT SCIATICA: ICD-10-CM

## 2022-06-14 DIAGNOSIS — S61.219A LACERATION OF FINGER, FOREIGN BODY PRESENCE UNSPECIFIED, NAIL DAMAGE STATUS UNSPECIFIED, UNSPECIFIED FINGER, UNSPECIFIED LATERALITY, INITIAL ENCOUNTER: ICD-10-CM

## 2022-06-14 DIAGNOSIS — B37.2 CANDIDAL INTERTRIGO: ICD-10-CM

## 2022-06-14 DIAGNOSIS — R73.01 IMPAIRED FASTING GLUCOSE: ICD-10-CM

## 2022-06-14 DIAGNOSIS — I10 BENIGN ESSENTIAL HYPERTENSION: ICD-10-CM

## 2022-06-14 DIAGNOSIS — M54.50 CHRONIC BILATERAL LOW BACK PAIN WITHOUT SCIATICA: ICD-10-CM

## 2022-06-14 DIAGNOSIS — Z23 NEED FOR TD VACCINE: ICD-10-CM

## 2022-06-14 PROBLEM — S61.210A LACERATION OF RIGHT INDEX FINGER WITHOUT FOREIGN BODY WITHOUT DAMAGE TO NAIL: Status: ACTIVE | Noted: 2022-06-14

## 2022-06-14 PROCEDURE — 90471 IMMUNIZATION ADMIN: CPT | Performed by: INTERNAL MEDICINE

## 2022-06-14 PROCEDURE — 90714 TD VACC NO PRESV 7 YRS+ IM: CPT | Performed by: INTERNAL MEDICINE

## 2022-06-14 PROCEDURE — 99214 OFFICE O/P EST MOD 30 MIN: CPT | Performed by: INTERNAL MEDICINE

## 2022-06-14 RX ORDER — CEPHALEXIN 500 MG/1
500 CAPSULE ORAL
Qty: 4 CAPSULE | Refills: 5 | Status: SHIPPED | OUTPATIENT
Start: 2022-06-14 | End: 2023-06-14

## 2022-06-14 RX ORDER — NYSTATIN 100000 [USP'U]/G
POWDER TOPICAL 2 TIMES DAILY
Qty: 60 G | Refills: 3 | Status: SHIPPED | OUTPATIENT
Start: 2022-06-14

## 2022-06-14 RX ORDER — EZETIMIBE 10 MG/1
10 TABLET ORAL DAILY
Qty: 90 TABLET | Refills: 1 | Status: SHIPPED | OUTPATIENT
Start: 2022-06-14

## 2022-06-14 RX ORDER — ESCITALOPRAM OXALATE 10 MG/1
10 TABLET ORAL DAILY
Qty: 90 TABLET | Refills: 1 | Status: SHIPPED | OUTPATIENT
Start: 2022-06-14

## 2022-06-14 RX ORDER — CYCLOBENZAPRINE HCL 5 MG
5 TABLET ORAL 3 TIMES DAILY PRN
Qty: 30 TABLET | Refills: 1 | Status: SHIPPED | OUTPATIENT
Start: 2022-06-14

## 2022-06-14 RX ORDER — NYSTATIN 100000 U/G
CREAM TOPICAL 2 TIMES DAILY
Qty: 30 G | Refills: 0 | Status: SHIPPED | OUTPATIENT
Start: 2022-06-14 | End: 2022-06-14

## 2022-06-14 RX ORDER — SIMVASTATIN 40 MG
40 TABLET ORAL DAILY
Qty: 90 TABLET | Refills: 1 | Status: SHIPPED | OUTPATIENT
Start: 2022-06-14

## 2022-06-14 NOTE — PROGRESS NOTES
Assessment/Plan:     Diagnoses and all orders for this visit:    Thrombocytopenia (HCC)  Stable platelet count no bleeding problems  Anxiety  -     escitalopram (LEXAPRO) 10 mg tablet; Take 1 tablet (10 mg total) by mouth daily    Hypercholesteremia  -     ezetimibe (ZETIA) 10 mg tablet; Take 1 tablet (10 mg total) by mouth daily  -     simvastatin (ZOCOR) 40 mg tablet; Take 1 tablet (40 mg total) by mouth daily  -     Lipid panel; Future    Obesity, morbid ([address] )  Diet and exercise weight loss  Impaired fasting glucose  -     Hemoglobin A1C; Future  -     Comprehensive metabolic panel; Future  Follow-up blood workup  Benign essential hypertension  Controlled  Exposure to group B Streptococcus with inadequate intrapartum antibiotic prophylaxis  -     cephalexin (KEFLEX) 500 mg capsule; Take 1 capsule (500 mg total) by mouth 30 min pre-procedure    Chronic bilateral low back pain without sciatica  -     cyclobenzaprine (FLEXERIL) 5 mg tablet; Take 1 tablet (5 mg total) by mouth 3 (three) times a day as needed for muscle spasms    Candidal intertrigo  -     Discontinue: nystatin (MYCOSTATIN) cream; Apply topically 2 (two) times a day  -     nystatin (MYCOSTATIN) powder; Apply topically 2 (two) times a day    Status post total left knee replacement             M*Modal software was used to dictate this note  It may contain errors with dictating incorrect words or incorrect spelling  Please contact the provider directly with any questions  Subjective:   Chief Complaint   Patient presents with    Follow-up     4 month-  BW done 22- Lipid and CMP not completed, order was -     Rash     Pt has rash in groin from excoriation, pt would like nystatin powder for it        Patient ID: Darshana Arceo is a 67 y o  female      HPI  This is a very pleasant 67 years young lady who is here today for the regular follow-up 2 complaints 1 is the back pain she is doing lot of work and remodeling the house and get muscle spasm and then she takes Advil which helps the pain she is requesting any muscle relaxer which can help so she does not need to take Advil will give her Flexeril p r n  For the pain and muscle relaxation  Recently when she was opening the bottle she cut her base of the right index finger the area is healing well but I will suggest to her to get tetanus injection she is due for the tetanus  Hypertension is very well controlled  Anxiety and depression is stable  Hypercholesterolemia follow-up with the blood workup  Insomnia she is on Ambien  The following portions of the patient's history were reviewed and updated as appropriate: allergies, current medications, past family history, past medical history, past social history, past surgical history and problem list     Review of Systems   Constitutional: Positive for fatigue  Negative for chills  HENT: Negative for congestion, ear pain, hearing loss, postnasal drip, sinus pressure, sore throat and voice change  Eyes: Negative for pain, discharge and visual disturbance  Respiratory: Negative for cough, chest tightness and shortness of breath  Cardiovascular: Negative for chest pain, palpitations and leg swelling  Gastrointestinal: Negative for abdominal pain, blood in stool, diarrhea, nausea and rectal pain  Genitourinary: Negative for difficulty urinating, dysuria and urgency  Musculoskeletal: Positive for back pain  Negative for arthralgias and joint swelling  Skin: Negative for rash  Allergic/Immunologic: Negative for environmental allergies and food allergies  Neurological: Negative for dizziness, tremors, weakness, numbness and headaches  Hematological: Negative for adenopathy  Psychiatric/Behavioral: Positive for sleep disturbance  Negative for behavioral problems and hallucinations           Past Medical History:   Diagnosis Date    Anxiety     last assessed 02/18/14    Arthritis     Depression     Endometriosis     Generalized osteoarthritis of multiple sites     last assessed 02/18/14    HL (hearing loss) (    Hyperlipidemia     Hypertension     Left knee pain     Major depression, single episode     mental status change    Pelvic adhesions     Squamous cell skin cancer     resolved 09/2007; back         Current Outpatient Medications:     cephalexin (KEFLEX) 500 mg capsule, TAKE 4 CAPSULES ONE HOUR BEFORE DENTAL WORK, Disp: , Rfl:     escitalopram (LEXAPRO) 10 mg tablet, Take 1 tablet (10 mg total) by mouth daily, Disp: 90 tablet, Rfl: 1    ezetimibe (ZETIA) 10 mg tablet, Take 1 tablet (10 mg total) by mouth daily, Disp: 90 tablet, Rfl: 1    LORazepam (ATIVAN) 1 mg tablet, Take 1 tablet (1 mg total) by mouth daily as needed for anxiety, Disp: 30 tablet, Rfl: 5    nadolol (CORGARD) 40 mg tablet, Take 1 tablet (40 mg total) by mouth daily, Disp: 90 tablet, Rfl: 1    naloxone (NARCAN) 4 mg/0 1 mL nasal spray, Administer 1 spray into a nostril   If no response after 2-3 minutes, give another dose in the other nostril using a new spray , Disp: 1 each, Rfl: 1    simvastatin (ZOCOR) 40 mg tablet, Take 1 tablet (40 mg total) by mouth daily, Disp: 90 tablet, Rfl: 1    traMADol (ULTRAM) 50 mg tablet, Take 1 tablet (50 mg total) by mouth every 8 (eight) hours as needed (prn), Disp: 90 tablet, Rfl: 5    zolpidem (AMBIEN) 5 mg tablet, Take 1 tablet (5 mg total) by mouth daily at bedtime as needed (prn), Disp: 30 tablet, Rfl: 5    Allergies   Allergen Reactions    Aspirin GI Intolerance    Codeine Nausea Only, GI Intolerance and Vomiting     Derivatives    Oxycodone-Acetaminophen Vomiting and GI Intolerance     darvocet  Percocet Tabs       Social History   Past Surgical History:   Procedure Laterality Date    APPENDECTOMY      BREAST BIOPSY Left 07/01/2005    stereotactic; percutaneous needle core use imag guide    BREAST EXCISIONAL BIOPSY Left     CHOLECYSTECTOMY      COLONOSCOPY      PILONIDAL CYST EXCISION      TOTAL ABDOMINAL HYSTERECTOMY Bilateral 1994    age 38    TOTAL ABDOMINAL HYSTERECTOMY W/ BILATERAL SALPINGOOPHORECTOMY Bilateral 1994    age 38     Family History   Problem Relation Age of Onset    Breast cancer Mother 61        lumpectomy    Hypertension Mother     Thyroid disease Mother     Cancer Mother 80        abdominal mass    Lung cancer Father     Lung cancer Paternal [de-identified]     Esophageal cancer Paternal Aunt     Kidney nephrosis Son     Hypertension Son     No Known Problems Maternal Grandmother     No Known Problems Maternal Grandfather     Dementia Paternal Grandmother     Leukemia Paternal Grandfather     No Known Problems Son     Breast cancer Maternal Aunt 79    No Known Problems Maternal Aunt     No Known Problems Maternal Aunt        Objective:  /78 (BP Location: Left arm, Patient Position: Sitting, Cuff Size: Large)   Pulse 90   Temp 98 °F (36 7 °C) (Temporal)   Ht 5' 1" (1 549 m)   Wt 85 9 kg (189 lb 4 8 oz)   SpO2 97% Comment: room air  BMI 35 77 kg/m²        Physical Exam  Constitutional:       Appearance: She is well-developed  She is obese  HENT:      Right Ear: External ear normal    Eyes:      General: Scleral icterus: mycosta  Conjunctiva/sclera: Conjunctivae normal       Pupils: Pupils are equal, round, and reactive to light  Neck:      Thyroid: No thyromegaly  Vascular: No JVD  Cardiovascular:      Rate and Rhythm: Normal rate and regular rhythm  Heart sounds: Normal heart sounds  Pulmonary:      Breath sounds: Normal breath sounds  Abdominal:      General: Bowel sounds are normal       Palpations: Abdomen is soft  Musculoskeletal:         General: Normal range of motion  Cervical back: Normal range of motion  Lymphadenopathy:      Cervical: No cervical adenopathy  Skin:     General: Skin is dry  Neurological:      Mental Status: She is alert and oriented to person, place, and time        Deep Tendon Reflexes: Reflexes are normal and symmetric     Psychiatric:         Behavior: Behavior normal

## 2022-06-15 ENCOUNTER — TELEPHONE (OUTPATIENT)
Dept: INTERNAL MEDICINE CLINIC | Age: 72
End: 2022-06-15

## 2022-06-15 NOTE — TELEPHONE ENCOUNTER
rec'd prior auth for medication:    cyclobenzaprine (FLEXERIL) 5 mg tablet      scanning into media and placing in bin

## 2022-08-11 DIAGNOSIS — M19.90 ARTHRITIS: ICD-10-CM

## 2022-08-11 DIAGNOSIS — M25.562 CHRONIC PAIN OF BOTH KNEES: ICD-10-CM

## 2022-08-11 DIAGNOSIS — G47.00 INSOMNIA, UNSPECIFIED TYPE: ICD-10-CM

## 2022-08-11 DIAGNOSIS — M25.561 CHRONIC PAIN OF BOTH KNEES: ICD-10-CM

## 2022-08-11 DIAGNOSIS — D69.6 THROMBOCYTOPENIA (HCC): ICD-10-CM

## 2022-08-11 DIAGNOSIS — F41.9 ANXIETY: ICD-10-CM

## 2022-08-11 DIAGNOSIS — G89.29 CHRONIC PAIN OF BOTH KNEES: ICD-10-CM

## 2022-08-11 RX ORDER — LORAZEPAM 1 MG/1
1 TABLET ORAL DAILY PRN
Qty: 30 TABLET | Refills: 5 | Status: SHIPPED | OUTPATIENT
Start: 2022-08-11

## 2022-08-11 RX ORDER — TRAMADOL HYDROCHLORIDE 50 MG/1
50 TABLET ORAL EVERY 8 HOURS PRN
Qty: 90 TABLET | Refills: 5 | Status: SHIPPED | OUTPATIENT
Start: 2022-08-11 | End: 2023-08-11

## 2022-08-11 NOTE — TELEPHONE ENCOUNTER
LS 6/14/22, NA 9/14/22, LF 2/822 tramadol amt 90/5 takes tid, lorazepam 2/8/22 amt 30/5 takes one per  day , checked the PDMP

## 2022-08-11 NOTE — TELEPHONE ENCOUNTER
LS 6/14/22, NA 9/14/22, LF 2/28/22 amt 30/5-per pt she  will run out on the august 15- please send to Dr Trell Luther on 8/15/22

## 2022-08-15 RX ORDER — ZOLPIDEM TARTRATE 5 MG/1
5 TABLET ORAL
Qty: 30 TABLET | Refills: 5 | Status: SHIPPED | OUTPATIENT
Start: 2022-08-15

## 2022-09-08 ENCOUNTER — APPOINTMENT (OUTPATIENT)
Dept: LAB | Age: 72
End: 2022-09-08
Payer: MEDICARE

## 2022-09-08 DIAGNOSIS — R73.01 IMPAIRED FASTING GLUCOSE: ICD-10-CM

## 2022-09-08 DIAGNOSIS — E78.00 HYPERCHOLESTEREMIA: ICD-10-CM

## 2022-09-08 LAB
ALBUMIN SERPL BCP-MCNC: 3.2 G/DL (ref 3.5–5)
ALP SERPL-CCNC: 105 U/L (ref 46–116)
ALT SERPL W P-5'-P-CCNC: 17 U/L (ref 12–78)
ANION GAP SERPL CALCULATED.3IONS-SCNC: 5 MMOL/L (ref 4–13)
AST SERPL W P-5'-P-CCNC: 19 U/L (ref 5–45)
BILIRUB SERPL-MCNC: 0.91 MG/DL (ref 0.2–1)
BUN SERPL-MCNC: 9 MG/DL (ref 5–25)
CALCIUM ALBUM COR SERPL-MCNC: 9.2 MG/DL (ref 8.3–10.1)
CALCIUM SERPL-MCNC: 8.6 MG/DL (ref 8.3–10.1)
CHLORIDE SERPL-SCNC: 106 MMOL/L (ref 96–108)
CHOLEST SERPL-MCNC: 129 MG/DL
CO2 SERPL-SCNC: 30 MMOL/L (ref 21–32)
CREAT SERPL-MCNC: 0.81 MG/DL (ref 0.6–1.3)
EST. AVERAGE GLUCOSE BLD GHB EST-MCNC: 108 MG/DL
GFR SERPL CREATININE-BSD FRML MDRD: 72 ML/MIN/1.73SQ M
GLUCOSE P FAST SERPL-MCNC: 106 MG/DL (ref 65–99)
HBA1C MFR BLD: 5.4 %
HDLC SERPL-MCNC: 59 MG/DL
LDLC SERPL CALC-MCNC: 52 MG/DL (ref 0–100)
NONHDLC SERPL-MCNC: 70 MG/DL
POTASSIUM SERPL-SCNC: 3.5 MMOL/L (ref 3.5–5.3)
PROT SERPL-MCNC: 7 G/DL (ref 6.4–8.4)
SODIUM SERPL-SCNC: 141 MMOL/L (ref 135–147)
TRIGL SERPL-MCNC: 91 MG/DL

## 2022-09-08 PROCEDURE — 80061 LIPID PANEL: CPT

## 2022-09-08 PROCEDURE — 80053 COMPREHEN METABOLIC PANEL: CPT

## 2022-09-08 PROCEDURE — 36415 COLL VENOUS BLD VENIPUNCTURE: CPT

## 2022-09-08 PROCEDURE — 83036 HEMOGLOBIN GLYCOSYLATED A1C: CPT

## 2022-09-14 ENCOUNTER — TELEPHONE (OUTPATIENT)
Dept: INTERNAL MEDICINE CLINIC | Age: 72
End: 2022-09-14

## 2022-09-14 ENCOUNTER — TELEMEDICINE (OUTPATIENT)
Dept: INTERNAL MEDICINE CLINIC | Age: 72
End: 2022-09-14
Payer: MEDICARE

## 2022-09-14 DIAGNOSIS — E66.01 OBESITY, MORBID (HCC): ICD-10-CM

## 2022-09-14 DIAGNOSIS — D69.6 THROMBOCYTOPENIA (HCC): ICD-10-CM

## 2022-09-14 DIAGNOSIS — I10 BENIGN ESSENTIAL HYPERTENSION: Primary | ICD-10-CM

## 2022-09-14 DIAGNOSIS — R73.01 IMPAIRED FASTING GLUCOSE: ICD-10-CM

## 2022-09-14 DIAGNOSIS — F32.A DEPRESSION, UNSPECIFIED DEPRESSION TYPE: ICD-10-CM

## 2022-09-14 PROCEDURE — 99442 PR PHYS/QHP TELEPHONE EVALUATION 11-20 MIN: CPT | Performed by: INTERNAL MEDICINE

## 2022-09-14 NOTE — PROGRESS NOTES
Virtual Brief Visit    Patient is located in the following state in which I hold an active license [place]      Assessment/Plan:    Problem List Items Addressed This Visit        Endocrine    Impaired fasting glucose       Cardiovascular and Mediastinum    Benign essential hypertension - Primary       Other    Depression    Thrombocytopenia (HCC)    Obesity, morbid ([address] )      It was my intent to perform this visit via video technology but the patient was not able to do a video connection so the visit was completed via audio telephone only  This is the 67 years young lady for virtual visit she had a multiple medical problems biggest problem is her anxiety and insomnia she is taking Ambien 5 mg once a day which is not helping much but it is better with that she denying any symptoms right now review of system is essentially unremarkable except for the anxiety   I reviewed the blood workup  Impaired fasting blood sugar but hemoglobin A1c is good continue with the diet exercise and weight loss hypertension is controlled anxiety and depression could be better controlled but right now she is going through a lot at home and that is why I think she is having more problem with the anxiety  Recent Visits  No visits were found meeting these conditions  Showing recent visits within past 7 days and meeting all other requirements  Today's Visits  Date Type Provider Dept   09/14/22 Telemedicine Jennifer Hung MD [facility]   Showing today's visits and meeting all other requirements  Future Appointments  No visits were found meeting these conditions    Showing future appointments within next 150 days and meeting all other requirements         I spent 15 minutes directly with the patient during this visit

## 2022-09-14 NOTE — TELEPHONE ENCOUNTER
Pt called office and wanted to me to ask you if you recommend her getting the new covid booster  And also would you recommend she get both the covid booster and the flu vaccine together

## 2022-10-11 PROBLEM — S61.210A LACERATION OF RIGHT INDEX FINGER WITHOUT FOREIGN BODY WITHOUT DAMAGE TO NAIL: Status: RESOLVED | Noted: 2022-06-14 | Resolved: 2022-10-11

## 2022-12-06 ENCOUNTER — RA CDI HCC (OUTPATIENT)
Dept: OTHER | Facility: HOSPITAL | Age: 72
End: 2022-12-06

## 2022-12-13 ENCOUNTER — OFFICE VISIT (OUTPATIENT)
Dept: INTERNAL MEDICINE CLINIC | Age: 72
End: 2022-12-13

## 2022-12-13 VITALS
DIASTOLIC BLOOD PRESSURE: 70 MMHG | SYSTOLIC BLOOD PRESSURE: 148 MMHG | TEMPERATURE: 98.3 F | OXYGEN SATURATION: 99 % | WEIGHT: 185 LBS | HEART RATE: 63 BPM | HEIGHT: 61 IN | BODY MASS INDEX: 34.93 KG/M2

## 2022-12-13 DIAGNOSIS — I10 BENIGN ESSENTIAL HYPERTENSION: ICD-10-CM

## 2022-12-13 DIAGNOSIS — G47.00 INSOMNIA, UNSPECIFIED TYPE: ICD-10-CM

## 2022-12-13 DIAGNOSIS — F32.A DEPRESSION, UNSPECIFIED DEPRESSION TYPE: ICD-10-CM

## 2022-12-13 DIAGNOSIS — E66.01 OBESITY, MORBID (HCC): ICD-10-CM

## 2022-12-13 DIAGNOSIS — D69.6 THROMBOCYTOPENIA (HCC): Primary | ICD-10-CM

## 2022-12-13 RX ORDER — LOSARTAN POTASSIUM 50 MG/1
50 TABLET ORAL DAILY
Qty: 90 TABLET | Refills: 1 | Status: SHIPPED | OUTPATIENT
Start: 2022-12-13

## 2022-12-13 NOTE — PROGRESS NOTES
Assessment/Plan:     Diagnoses and all orders for this visit:    Thrombocytopenia ([address] )  Stable  Obesity, morbid ([address] )  Diet exercise and weight  Benign essential hypertension  -     losartan (COZAAR) 50 mg tablet; Take 1 tablet (50 mg total) by mouth daily  -     Basic metabolic panel; Future    Depression, unspecified depression type  Stable  Insomnia, unspecified type    Needs something to help her with sleep         M*Modal software was used to dictate this note  It may contain errors with dictating incorrect words or incorrect spelling  Please contact the provider directly with any questions  Subjective:   Chief Complaint   Patient presents with   • Follow-up     3 month- labs done 9/8/22- no issues        Patient ID: Claudia Son is a 67 y o  female  A very pleasant 67 years young lady who is here today for the regular follow-up she is doing well no new complaints except for the right knee pain sometimes when she bends her knee and try to sit [facility] style  She had a left total knee replacement done several years ago  Hypertension is poor control it was high last time to although today she is little bit upset and aggravated but keeping in mind that the blood pressure was very much normal before and now it is high x2 I will add losartan to her nadolol      Hypercholesterolemia her lipid panel was excellent    Paired fasting blood sugar no signs of diabetes and her hemoglobin A1c was normal continue to do the diet exercise and weight loss and hopefully will stay away from diabetes for a long time    Anxiety and depression is a stable continue present care follow-up in 3 months BMP in 1 month since I started her on losartan      The following portions of the patient's history were reviewed and updated as appropriate: allergies, current medications, past family history, past medical history, past social history, past surgical history and problem list     Review of Systems   Constitutional: Positive for fatigue  Negative for chills  HENT: Negative for congestion, ear pain, hearing loss, postnasal drip, sinus pressure, sore throat and voice change  Eyes: Negative for pain, discharge and visual disturbance  Respiratory: Negative for cough, chest tightness and shortness of breath  Cardiovascular: Negative for chest pain, palpitations and leg swelling  Gastrointestinal: Negative for abdominal pain, blood in stool, diarrhea, nausea and rectal pain  Genitourinary: Negative for difficulty urinating, dysuria and urgency  Musculoskeletal: Positive for arthralgias (knee pain)  Negative for joint swelling  Skin: Negative for rash  Allergic/Immunologic: Negative for environmental allergies and food allergies  Neurological: Negative for dizziness, tremors, weakness, numbness and headaches  Hematological: Negative for adenopathy  Psychiatric/Behavioral: Negative for behavioral problems and hallucinations           Past Medical History:   Diagnosis Date   • Anxiety     last assessed 02/18/14   • Arthritis    • Depression    • Endometriosis    • Generalized osteoarthritis of multiple sites     last assessed 02/18/14   • HL (hearing loss) (   • Hyperlipidemia    • Hypertension    • Left knee pain    • Major depression, single episode     mental status change   • Pelvic adhesions    • Squamous cell skin cancer     resolved 09/2007; back         Current Outpatient Medications:   •  cyclobenzaprine (FLEXERIL) 5 mg tablet, Take 1 tablet (5 mg total) by mouth 3 (three) times a day as needed for muscle spasms, Disp: 30 tablet, Rfl: 1  •  escitalopram (LEXAPRO) 10 mg tablet, Take 1 tablet (10 mg total) by mouth daily, Disp: 90 tablet, Rfl: 1  •  ezetimibe (ZETIA) 10 mg tablet, Take 1 tablet (10 mg total) by mouth daily, Disp: 90 tablet, Rfl: 1  •  LORazepam (ATIVAN) 1 mg tablet, Take 1 tablet (1 mg total) by mouth daily as needed for anxiety, Disp: 30 tablet, Rfl: 5  •  nadolol (CORGARD) 40 mg tablet, Take 1 tablet (40 mg total) by mouth daily, Disp: 90 tablet, Rfl: 1  •  naloxone (NARCAN) 4 mg/0 1 mL nasal spray, Administer 1 spray into a nostril   If no response after 2-3 minutes, give another dose in the other nostril using a new spray , Disp: 1 each, Rfl: 1  •  nystatin (MYCOSTATIN) powder, Apply topically 2 (two) times a day, Disp: 60 g, Rfl: 3  •  simvastatin (ZOCOR) 40 mg tablet, Take 1 tablet (40 mg total) by mouth daily, Disp: 90 tablet, Rfl: 1  •  traMADol (ULTRAM) 50 mg tablet, Take 1 tablet (50 mg total) by mouth every 8 (eight) hours as needed (prn), Disp: 90 tablet, Rfl: 5  •  zolpidem (AMBIEN) 5 mg tablet, Take 1 tablet (5 mg total) by mouth daily at bedtime as needed (prn), Disp: 30 tablet, Rfl: 5    Allergies   Allergen Reactions   • Aspirin GI Intolerance   • Codeine Nausea Only, GI Intolerance and Vomiting     Derivatives   • Oxycodone-Acetaminophen Vomiting and GI Intolerance     darvocet  Percocet Tabs       Social History   Past Surgical History:   Procedure Laterality Date   • APPENDECTOMY     • BREAST BIOPSY Left 07/01/2005    stereotactic; percutaneous needle core use imag guide   • BREAST EXCISIONAL BIOPSY Left    • CHOLECYSTECTOMY     • COLONOSCOPY     • PILONIDAL CYST EXCISION     • TOTAL ABDOMINAL HYSTERECTOMY Bilateral 1994    age 38   • TOTAL ABDOMINAL HYSTERECTOMY W/ BILATERAL SALPINGOOPHORECTOMY Bilateral 1994    age 38     Family History   Problem Relation Age of Onset   • Breast cancer Mother         lumpectomy   • Hypertension Mother    • Thyroid disease Mother    • Cancer Mother         abdominal mass   • Lung cancer Father    • Lung cancer Paternal Aunt    • Esophageal cancer Paternal Aunt    • Kidney nephrosis Son    • Hypertension Son    • No Known Problems Maternal Grandmother    • No Known Problems Maternal Grandfather    • Dementia Paternal Grandmother    • Leukemia Paternal Grandfather    • No Known Problems Son    • Breast cancer Maternal Aunt    • No Known Problems Maternal Aunt    • No Known Problems Maternal Aunt        Objective:  /70 (BP Location: Left arm, Patient Position: Sitting, Cuff Size: Large)   Pulse 63   Temp 98 3 °F (36 8 °C) (Temporal)   Ht 5' 1" (1 549 m)   Wt 83 9 kg (185 lb)   SpO2 99% Comment: room air  BMI 34 96 kg/m²        Physical Exam  Constitutional:       Appearance: She is well-developed  She is obese  HENT:      Right Ear: External ear normal    Eyes:      Conjunctiva/sclera: Conjunctivae normal       Pupils: Pupils are equal, round, and reactive to light  Neck:      Thyroid: No thyromegaly  Vascular: No JVD  Cardiovascular:      Rate and Rhythm: Normal rate and regular rhythm  Heart sounds: Normal heart sounds  Pulmonary:      Breath sounds: Normal breath sounds  Abdominal:      General: Bowel sounds are normal       Palpations: Abdomen is soft  Musculoskeletal:         General: Normal range of motion  Cervical back: Normal range of motion  Lymphadenopathy:      Cervical: No cervical adenopathy  Skin:     General: Skin is dry  Neurological:      General: No focal deficit present  Mental Status: She is alert and oriented to person, place, and time  Mental status is at baseline  Deep Tendon Reflexes: Reflexes are normal and symmetric     Psychiatric:         Mood and Affect: Mood normal          Behavior: Behavior normal

## 2023-02-06 DIAGNOSIS — M25.562 CHRONIC PAIN OF BOTH KNEES: ICD-10-CM

## 2023-02-06 DIAGNOSIS — M25.561 CHRONIC PAIN OF BOTH KNEES: ICD-10-CM

## 2023-02-06 DIAGNOSIS — F41.9 ANXIETY: ICD-10-CM

## 2023-02-06 DIAGNOSIS — G47.00 INSOMNIA, UNSPECIFIED TYPE: ICD-10-CM

## 2023-02-06 DIAGNOSIS — M19.90 ARTHRITIS: ICD-10-CM

## 2023-02-06 DIAGNOSIS — D69.6 THROMBOCYTOPENIA (HCC): ICD-10-CM

## 2023-02-06 DIAGNOSIS — G89.29 CHRONIC PAIN OF BOTH KNEES: ICD-10-CM

## 2023-02-08 RX ORDER — LORAZEPAM 1 MG/1
1 TABLET ORAL DAILY PRN
Qty: 30 TABLET | Refills: 5 | Status: SHIPPED | OUTPATIENT
Start: 2023-02-08

## 2023-02-08 RX ORDER — TRAMADOL HYDROCHLORIDE 50 MG/1
50 TABLET ORAL EVERY 8 HOURS PRN
Qty: 90 TABLET | Refills: 5 | Status: SHIPPED | OUTPATIENT
Start: 2023-02-08 | End: 2024-02-08

## 2023-02-08 RX ORDER — ZOLPIDEM TARTRATE 5 MG/1
5 TABLET ORAL
Qty: 30 TABLET | Refills: 5 | Status: SHIPPED | OUTPATIENT
Start: 2023-02-08

## 2023-03-07 ENCOUNTER — RA CDI HCC (OUTPATIENT)
Dept: OTHER | Facility: HOSPITAL | Age: 73
End: 2023-03-07

## 2023-03-14 ENCOUNTER — OFFICE VISIT (OUTPATIENT)
Dept: INTERNAL MEDICINE CLINIC | Facility: OTHER | Age: 73
End: 2023-03-14

## 2023-03-14 VITALS
BODY MASS INDEX: 34.93 KG/M2 | SYSTOLIC BLOOD PRESSURE: 140 MMHG | HEART RATE: 79 BPM | WEIGHT: 185 LBS | TEMPERATURE: 98 F | OXYGEN SATURATION: 98 % | DIASTOLIC BLOOD PRESSURE: 78 MMHG | HEIGHT: 61 IN

## 2023-03-14 DIAGNOSIS — E66.01 OBESITY, MORBID (HCC): ICD-10-CM

## 2023-03-14 DIAGNOSIS — Z12.31 ENCOUNTER FOR SCREENING MAMMOGRAM FOR MALIGNANT NEOPLASM OF BREAST: Primary | ICD-10-CM

## 2023-03-14 DIAGNOSIS — Z78.0 POSTMENOPAUSAL: ICD-10-CM

## 2023-03-14 DIAGNOSIS — Z00.00 MEDICARE ANNUAL WELLNESS VISIT, SUBSEQUENT: ICD-10-CM

## 2023-03-14 DIAGNOSIS — F11.20 CONTINUOUS OPIOID DEPENDENCE (HCC): ICD-10-CM

## 2023-03-14 DIAGNOSIS — I10 HYPERTENSION, UNSPECIFIED TYPE: ICD-10-CM

## 2023-03-14 DIAGNOSIS — D69.6 THROMBOCYTOPENIA (HCC): ICD-10-CM

## 2023-03-14 RX ORDER — CEPHALEXIN 500 MG/1
CAPSULE ORAL
COMMUNITY
Start: 2023-01-09

## 2023-03-14 RX ORDER — NADOLOL 80 MG/1
40 TABLET ORAL DAILY
Qty: 90 TABLET | Refills: 1 | Status: SHIPPED | OUTPATIENT
Start: 2023-03-14 | End: 2023-03-22 | Stop reason: SDUPTHER

## 2023-03-14 NOTE — PROGRESS NOTES
Assessment and Plan:     Problem List Items Addressed This Visit    None  Visit Diagnoses     Encounter for screening mammogram for malignant neoplasm of breast    -  Primary          BMI Counseling: Body mass index is 34 96 kg/m²  The BMI is above normal  Nutrition recommendations include decreasing portion sizes, encouraging healthy choices of fruits and vegetables and moderation in carbohydrate intake  Exercise recommendations include moderate physical activity 150 minutes/week  Rationale for BMI follow-up plan is due to patient being overweight or obese  Preventive health issues were discussed with patient, and age appropriate screening tests were ordered as noted in patient's After Visit Summary  Personalized health advice and appropriate referrals for health education or preventive services given if needed, as noted in patient's After Visit Summary  History of Present Illness:     Patient presents for a Medicare Wellness Visit    Is a very pleasant 75 years young lady who is here today for the Medicare wellness and for the regular office visit she is doing well except for some problems which are mentioned in the review of systems she is doing well    Hypertension she was a started on losartan but she was not agreed to the medication  Side effects were abdominal discomfort headache and some cough and she stopped taking the medication blood pressure is still elevated I will increase the nadolol from 40 to 80 mg and see how she does her blood pressure systolic was higher but better than before  Diet exercise and weight loss recommended again    hyper cholesterolemia continue with the medication she is tolerating the medication and the lipid panel was excellent  Anxiety and depression is a stable, insomnia is a still a problem she is taking Ambien but is still having problems with the sleep    Ambien 5 mg does not work and the 10 mg is not recommended for the woman he have to live with the 5 mg and try to do some sleep hygiene     Patient Care Team:  Eusebio Rene MD as PCP - General     Review of Systems:     Review of Systems   Constitutional: Positive for fatigue  Negative for chills  HENT: Positive for hearing loss  Negative for congestion, ear pain, postnasal drip, sinus pressure, sore throat and voice change  Eyes: Negative for pain, discharge and visual disturbance  Respiratory: Negative for cough, chest tightness and shortness of breath  Cardiovascular: Negative for chest pain, palpitations and leg swelling  Gastrointestinal: Negative for abdominal pain, blood in stool, diarrhea, nausea and rectal pain  Genitourinary: Positive for frequency and urgency  Negative for difficulty urinating and dysuria  Musculoskeletal: Positive for back pain  Negative for arthralgias and joint swelling  Skin: Negative for rash  Allergic/Immunologic: Negative for environmental allergies and food allergies  Neurological: Positive for headaches  Negative for dizziness, tremors, weakness and numbness  Hematological: Negative for adenopathy  Psychiatric/Behavioral: Positive for sleep disturbance  Negative for behavioral problems and hallucinations          Problem List:     Patient Active Problem List   Diagnosis   • Benign essential hypertension   • Depression   • Hypercholesterolemia   • Impaired fasting glucose   • Insomnia   • Thrombocytopenia (HCC)   • Hypocalcemia   • Obesity, morbid (HCC)   • Continuous opioid dependence ([facility] [address] )      Past Medical and Surgical History:     Past Medical History:   Diagnosis Date   • Anxiety     last assessed 02/18/14   • Arthritis    • Depression    • Endometriosis    • Generalized osteoarthritis of multiple sites     last assessed 02/18/14   • HL (hearing loss) (   • Hyperlipidemia    • Hypertension    • Left knee pain    • Major depression, single episode     mental status change   • Pelvic adhesions    • Squamous cell skin cancer     resolved 09/2007; back Past Surgical History:   Procedure Laterality Date   • APPENDECTOMY     • BREAST BIOPSY Left 07/01/2005    stereotactic; percutaneous needle core use imag guide   • BREAST EXCISIONAL BIOPSY Left    • CHOLECYSTECTOMY     • COLONOSCOPY     • PILONIDAL CYST EXCISION     • TOTAL ABDOMINAL HYSTERECTOMY Bilateral 1994    age 38   • TOTAL ABDOMINAL HYSTERECTOMY W/ BILATERAL SALPINGOOPHORECTOMY Bilateral 1994    age 38      Family History:     Family History   Problem Relation Age of Onset   • Breast cancer Mother         lumpectomy   • Hypertension Mother    • Thyroid disease Mother    • Cancer Mother         abdominal mass   • Lung cancer Father    • Lung cancer Paternal Aunt    • Esophageal cancer Paternal Aunt    • Kidney nephrosis Son    • Hypertension Son    • No Known Problems Maternal Grandmother    • No Known Problems Maternal Grandfather    • Dementia Paternal Grandmother    • Leukemia Paternal Grandfather    • No Known Problems Son    • Breast cancer Maternal Aunt    • No Known Problems Maternal Aunt    • No Known Problems Maternal Aunt       Social History:     Social History     Socioeconomic History   • Marital status: /Civil Union     Spouse name: Not on file   • Number of children: 2   • Years of education: Not on file   • Highest education level: Not on file   Occupational History   • 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   Tobacco Use   • Smoking status: Never   • Smokeless tobacco: Never   Vaping Use   • Vaping Use: Never used   Substance and Sexual Activity   • Alcohol use: Not Currently     Alcohol/week: 1 0 standard drink     Types: 1 Glasses of wine per week     Comment: rarely consumes   • Drug use: No   • Sexual activity: Yes     Partners: Male     Birth control/protection: Surgical   Other Topics Concern   • Not on file   Social History Narrative    Always uses seat belt    Caffeine use    Daily coffee consumption (2 cups/day)    Exercise habits: Walking 5x per week Feels safe at home    Lives with spouse     Social Determinants of Health     Financial Resource Strain: Not on file   Food Insecurity: Not on file   Transportation Needs: No Transportation Needs   • Lack of Transportation (Medical): No   • Lack of Transportation (Non-Medical): No   Physical Activity: Not on file   Stress: Not on file   Social Connections: Not on file   Intimate Partner Violence: Not on file   Housing Stability: Not on file      Medications and Allergies:     Current Outpatient Medications   Medication Sig Dispense Refill   • cephalexin (KEFLEX) 500 mg capsule TAKE 4 CAPSULES ONE HOUR BEFORE DENTAL WORK     • cyclobenzaprine (FLEXERIL) 5 mg tablet Take 1 tablet (5 mg total) by mouth 3 (three) times a day as needed for muscle spasms 30 tablet 1   • escitalopram (LEXAPRO) 10 mg tablet Take 1 tablet (10 mg total) by mouth daily 90 tablet 1   • ezetimibe (ZETIA) 10 mg tablet Take 1 tablet (10 mg total) by mouth daily 90 tablet 1   • LORazepam (ATIVAN) 1 mg tablet Take 1 tablet (1 mg total) by mouth daily as needed for anxiety 30 tablet 5   • nadolol (CORGARD) 40 mg tablet Take 1 tablet (40 mg total) by mouth daily 90 tablet 1   • naloxone (NARCAN) 4 mg/0 1 mL nasal spray Administer 1 spray into a nostril  If no response after 2-3 minutes, give another dose in the other nostril using a new spray  1 each 1   • nystatin (MYCOSTATIN) powder Apply topically 2 (two) times a day 60 g 3   • simvastatin (ZOCOR) 40 mg tablet Take 1 tablet (40 mg total) by mouth daily 90 tablet 1   • traMADol (ULTRAM) 50 mg tablet Take 1 tablet (50 mg total) by mouth every 8 (eight) hours as needed (prn) 90 tablet 5   • zolpidem (AMBIEN) 5 mg tablet Take 1 tablet (5 mg total) by mouth daily at bedtime as needed (prn) 30 tablet 5   • losartan (COZAAR) 50 mg tablet Take 1 tablet (50 mg total) by mouth daily (Patient not taking: Reported on 3/14/2023) 90 tablet 1     No current facility-administered medications for this visit  Allergies   Allergen Reactions   • Aspirin GI Intolerance   • Codeine Nausea Only, GI Intolerance and Vomiting     Derivatives   • Oxycodone-Acetaminophen Vomiting and GI Intolerance     darvocet  Percocet Tabs      Immunizations:     Immunization History   Administered Date(s) Administered   • COVID-19 PFIZER VACCINE 0 3 ML IM 03/05/2021, 03/24/2021, 11/06/2021   • COVID-19 Pfizer Vac BIVALENT Joe-sucrose 12 Yr+ IM (BOOSTER ONLY) 10/20/2022   • COVID-19 Pfizer vac (Joe-sucrose, gray cap) 12 yr+ IM 05/03/2022   • H1N1, All Formulations 01/13/2010   • INFLUENZA 11/28/2006, 10/19/2007, 09/28/2018, 10/08/2019, 09/28/2020, 09/30/2021, 10/11/2022   • Influenza Split High Dose Preservative Free IM 09/29/2014, 10/26/2015, 10/08/2019   • Influenza, high dose seasonal 0 7 mL 10/08/2019, 09/28/2020   • Influenza, seasonal, injectable 1949, 11/19/2012, 10/06/2016, 10/16/2017   • Pneumococcal Conjugate 13-Valent 09/28/2018   • Pneumococcal Polysaccharide PPV23 06/13/2014   • TD (adult) Preservative Free 06/14/2022   • Td (adult), adsorbed 06/14/2022      Health Maintenance:         Topic Date Due   • Breast Cancer Screening: Mammogram  06/06/2023   • Colorectal Cancer Screening  08/04/2026   • Hepatitis C Screening  Completed     There are no preventive care reminders to display for this patient  Medicare Screening Tests and Risk Assessments:     Fabi Machado is here for her Subsequent Wellness visit  Health Risk Assessment:   Patient rates overall health as good  Patient feels that their physical health rating is same  Patient is very satisfied with their life  Eyesight was rated as same  Hearing was rated as same  Patient feels that their emotional and mental health rating is slightly better  Patients states they are never, rarely angry  Patient states they are sometimes unusually tired/fatigued  Pain experienced in the last 7 days has been some  Patient's pain rating has been 2/10   Patient states that she has experienced no weight loss or gain in last 6 months  Fall Risk Screening: In the past year, patient has experienced: no history of falling in past year      Urinary Incontinence Screening:   Patient has leaked urine accidently in the last six months  Home Safety:  Patient does not have trouble with stairs inside or outside of their home  Patient has working smoke alarms and has working carbon monoxide detector  Home safety hazards include: none  Nutrition:   Current diet is Regular  Medications:   Patient is not currently taking any over-the-counter supplements  Patient is able to manage medications  Activities of Daily Living (ADLs)/Instrumental Activities of Daily Living (IADLs):   Walk and transfer into and out of bed and chair?: Yes  Dress and groom yourself?: Yes    Bathe or shower yourself?: Yes    Feed yourself? Yes  Do your laundry/housekeeping?: Yes  Manage your money, pay your bills and track your expenses?: Yes  Make your own meals?: Yes    Do your own shopping?: Yes    Previous Hospitalizations:   Any hospitalizations or ED visits within the last 12 months?: No      Advance Care Planning:   Living will: Yes    Durable POA for healthcare:  Yes    Advanced directive: Yes    Advanced directive counseling given: Yes      Cognitive Screening:   Provider or family/friend/caregiver concerned regarding cognition?: No    PREVENTIVE SCREENINGS      Cardiovascular Screening:    General: Screening Not Indicated and History Lipid Disorder      Diabetes Screening:     General: Screening Current      Colorectal Cancer Screening:     General: Screening Current      Breast Cancer Screening:     General: Screening Current      Cervical Cancer Screening:    General: Screening Not Indicated      Osteoporosis Screening:      Due for: DXA Axial      Abdominal Aortic Aneurysm (AAA) Screening:        General: Screening Current      Lung Cancer Screening:     General: Screening Not Indicated      Hepatitis C Screening:    General: Screening Current    Screening, Brief Intervention, and Referral to Treatment (SBIRT)    Screening  Typical number of drinks in a day: 0  Typical number of drinks in a week: 0  Interpretation: Low risk drinking behavior  AUDIT-C Screenin) How often did you have a drink containing alcohol in the past year? monthly or less  2) How many drinks did you have on a typical day when you were drinking in the past year? 1 to 2  3) How often did you have 6 or more drinks on one occasion in the past year? never    AUDIT-C Score: 1  Interpretation: Score 0-2 (female): Negative screen for alcohol misuse    Single Item Drug Screening:  How often have you used an illegal drug (including marijuana) or a prescription medication for non-medical reasons in the past year? never    Single Item Drug Screen Score: 0  Interpretation: Negative screen for possible drug use disorder    Review of Current Opioid Use    Opioid Risk Tool (ORT) Interpretation: Complete Opioid Risk Tool (ORT)    Other Counseling Topics:   Calcium and vitamin D intake and regular weightbearing exercise  No results found  Physical Exam:     /78 (BP Location: Left arm, Patient Position: Sitting, Cuff Size: Large)   Pulse 79   Temp 98 °F (36 7 °C)   Ht 5' 1" (1 549 m)   Wt 83 9 kg (185 lb)   SpO2 98%   BMI 34 96 kg/m²     Physical Exam  Vitals and nursing note reviewed  Constitutional:       Appearance: She is obese  HENT:      Head: Normocephalic  Eyes:      Pupils: Pupils are equal, round, and reactive to light  Cardiovascular:      Rate and Rhythm: Normal rate and regular rhythm  Heart sounds: Murmur heard  Pulmonary:      Breath sounds: Normal breath sounds  Abdominal:      General: Bowel sounds are normal       Palpations: Abdomen is soft  Musculoskeletal:      Cervical back: Normal range of motion  Skin:     General: Skin is warm     Neurological:      Mental Status: She is alert and oriented to person, place, and time     Psychiatric:         Mood and Affect: Mood normal          Behavior: Behavior normal           Poly Santos MD

## 2023-03-22 ENCOUNTER — TELEPHONE (OUTPATIENT)
Dept: INTERNAL MEDICINE CLINIC | Age: 73
End: 2023-03-22

## 2023-03-22 DIAGNOSIS — I10 HYPERTENSION, UNSPECIFIED TYPE: ICD-10-CM

## 2023-03-22 RX ORDER — NADOLOL 80 MG/1
80 TABLET ORAL DAILY
Qty: 90 TABLET | Refills: 1 | Status: SHIPPED | OUTPATIENT
Start: 2023-03-22 | End: 2023-07-18 | Stop reason: SDUPTHER

## 2023-03-22 NOTE — TELEPHONE ENCOUNTER
Pt called the office and stated that you increased her nadalol from 40mg to 80mg but when she went to the pharmacy you had written the prescription for an 80mg tablet but to break it in half, once a day  Should she take the full 80mg once a day? If so, please send a new prescription to the pharmacy so she can  when she is out of pills

## 2023-06-09 ENCOUNTER — HOSPITAL ENCOUNTER (OUTPATIENT)
Dept: RADIOLOGY | Age: 73
Discharge: HOME | End: 2023-06-09
Payer: MEDICARE

## 2023-06-09 VITALS — WEIGHT: 185 LBS | BODY MASS INDEX: 34.93 KG/M2 | HEIGHT: 61 IN

## 2023-06-09 DIAGNOSIS — Z12.31 ENCOUNTER FOR SCREENING MAMMOGRAM FOR MALIGNANT NEOPLASM OF BREAST: ICD-10-CM

## 2023-06-09 PROCEDURE — 77067 SCR MAMMO BI INCL CAD: CPT

## 2023-06-09 PROCEDURE — 77063 BREAST TOMOSYNTHESIS BI: CPT

## 2023-07-18 ENCOUNTER — OFFICE VISIT (OUTPATIENT)
Dept: INTERNAL MEDICINE CLINIC | Age: 73
End: 2023-07-18
Payer: MEDICARE

## 2023-07-18 VITALS
TEMPERATURE: 97.9 F | WEIGHT: 191 LBS | BODY MASS INDEX: 36.06 KG/M2 | HEIGHT: 61 IN | SYSTOLIC BLOOD PRESSURE: 138 MMHG | OXYGEN SATURATION: 97 % | DIASTOLIC BLOOD PRESSURE: 72 MMHG | HEART RATE: 53 BPM

## 2023-07-18 DIAGNOSIS — D69.6 THROMBOCYTOPENIA (HCC): ICD-10-CM

## 2023-07-18 DIAGNOSIS — E55.9 VITAMIN D DEFICIENCY: Primary | ICD-10-CM

## 2023-07-18 DIAGNOSIS — M25.562 CHRONIC PAIN OF BOTH KNEES: ICD-10-CM

## 2023-07-18 DIAGNOSIS — I10 HYPERTENSION, UNSPECIFIED TYPE: ICD-10-CM

## 2023-07-18 DIAGNOSIS — E78.00 HYPERCHOLESTEREMIA: ICD-10-CM

## 2023-07-18 DIAGNOSIS — F41.9 ANXIETY: ICD-10-CM

## 2023-07-18 DIAGNOSIS — G47.00 INSOMNIA, UNSPECIFIED TYPE: ICD-10-CM

## 2023-07-18 DIAGNOSIS — G89.29 CHRONIC PAIN OF BOTH KNEES: ICD-10-CM

## 2023-07-18 DIAGNOSIS — M25.561 CHRONIC PAIN OF BOTH KNEES: ICD-10-CM

## 2023-07-18 DIAGNOSIS — B37.2 CANDIDAL INTERTRIGO: ICD-10-CM

## 2023-07-18 DIAGNOSIS — M19.90 ARTHRITIS: ICD-10-CM

## 2023-07-18 PROCEDURE — 99214 OFFICE O/P EST MOD 30 MIN: CPT | Performed by: INTERNAL MEDICINE

## 2023-07-18 RX ORDER — MOMETASONE FUROATE 1 MG/G
CREAM TOPICAL
COMMUNITY
Start: 2023-07-10

## 2023-07-18 RX ORDER — SIMVASTATIN 40 MG
40 TABLET ORAL DAILY
Qty: 90 TABLET | Refills: 1 | Status: SHIPPED | OUTPATIENT
Start: 2023-07-18

## 2023-07-18 RX ORDER — ZOLPIDEM TARTRATE 5 MG/1
5 TABLET ORAL
Qty: 30 TABLET | Refills: 5 | Status: SHIPPED | OUTPATIENT
Start: 2023-07-18

## 2023-07-18 RX ORDER — ESCITALOPRAM OXALATE 10 MG/1
10 TABLET ORAL DAILY
Qty: 90 TABLET | Refills: 1 | Status: SHIPPED | OUTPATIENT
Start: 2023-07-18

## 2023-07-18 RX ORDER — TRAMADOL HYDROCHLORIDE 50 MG/1
50 TABLET ORAL EVERY 8 HOURS PRN
Qty: 90 TABLET | Refills: 5 | Status: SHIPPED | OUTPATIENT
Start: 2023-07-18 | End: 2024-07-17

## 2023-07-18 RX ORDER — NADOLOL 80 MG/1
80 TABLET ORAL DAILY
Qty: 90 TABLET | Refills: 1 | Status: SHIPPED | OUTPATIENT
Start: 2023-07-18

## 2023-07-18 RX ORDER — NYSTATIN 100000 [USP'U]/G
POWDER TOPICAL 2 TIMES DAILY
Qty: 60 G | Refills: 3 | Status: SHIPPED | OUTPATIENT
Start: 2023-07-18

## 2023-07-18 RX ORDER — LORAZEPAM 1 MG/1
1 TABLET ORAL DAILY PRN
Qty: 30 TABLET | Refills: 5 | Status: SHIPPED | OUTPATIENT
Start: 2023-07-18

## 2023-07-18 RX ORDER — EZETIMIBE 10 MG/1
10 TABLET ORAL DAILY
Qty: 90 TABLET | Refills: 1 | Status: SHIPPED | OUTPATIENT
Start: 2023-07-18

## 2023-07-18 RX ORDER — KETOCONAZOLE 20 MG/G
CREAM TOPICAL
COMMUNITY
Start: 2023-07-10

## 2023-07-18 RX ORDER — ZOLPIDEM TARTRATE 5 MG/1
5 TABLET ORAL
Qty: 30 TABLET | Refills: 5 | Status: CANCELLED | OUTPATIENT
Start: 2023-07-18

## 2023-07-18 NOTE — PROGRESS NOTES
Assessment/Plan:      Diagnoses and all orders for this visit:    Vitamin D deficiency  -     Vitamin D 25 hydroxy; Future    Anxiety  -     escitalopram (LEXAPRO) 10 mg tablet; Take 1 tablet (10 mg total) by mouth daily  -     LORazepam (ATIVAN) 1 mg tablet; Take 1 tablet (1 mg total) by mouth daily as needed for anxiety    Hypercholesteremia  -     ezetimibe (ZETIA) 10 mg tablet; Take 1 tablet (10 mg total) by mouth daily  -     simvastatin (ZOCOR) 40 mg tablet; Take 1 tablet (40 mg total) by mouth daily  -     Comprehensive metabolic panel; Future  -     Lipid panel; Future  -     TSH, 3rd generation with Free T4 reflex; Future    Hypertension, unspecified type  -     nadolol (CORGARD) 80 MG tablet; Take 1 tablet (80 mg total) by mouth daily  -     Comprehensive metabolic panel; Future  -     Lipid panel; Future  -     TSH, 3rd generation with Free T4 reflex; Future  -     UA w Reflex to Microscopic w Reflex to Culture    Candidal intertrigo  -     nystatin (MYCOSTATIN) powder; Apply topically 2 (two) times a day    Thrombocytopenia (HCC)  -     traMADol (ULTRAM) 50 mg tablet; Take 1 tablet (50 mg total) by mouth every 8 (eight) hours as needed (prn)  -     CBC and differential; Future  -     Vitamin D 25 hydroxy; Future    Arthritis  -     traMADol (ULTRAM) 50 mg tablet; Take 1 tablet (50 mg total) by mouth every 8 (eight) hours as needed (prn)    Chronic pain of both knees  -     traMADol (ULTRAM) 50 mg tablet; Take 1 tablet (50 mg total) by mouth every 8 (eight) hours as needed (prn)    Insomnia, unspecified type  -     zolpidem (AMBIEN) 5 mg tablet;  Take 1 tablet (5 mg total) by mouth daily at bedtime as needed (prn)    Other orders  -     ketoconazole (NIZORAL) 2 % cream; APPLY TO AFFECTED AREAS OF SKIN UP TO TWICE DAILY, AS NEEDED WITH FLARE UPS  -     mometasone (ELOCON) 0.1 % cream; APPLY TO AFFECTED AREAS TWICE DAILY FOR TWO WEEKS ON, ONE WEEK OFF THEN AS NEEDED WITH FLARES             M*Modal software was used to dictate this note. It may contain errors with dictating incorrect words or incorrect spelling. Please contact the provider directly with any questions. Subjective:   Chief Complaint   Patient presents with   • Follow-up     4 month-  no labs done-  continuing difficulty sleeping        Patient ID: Alex Pearson is a 75 y.o. female. HPI  This is a very pleasant 24 years young lady who is here today for the regular follow-up  Hypertension is reasonably good control systolic blood pressure could be a little better it is 138 patient is here today for the follow-up. Hypertension. I reviewed antihypertensive medication, patient does not have any side effects of  medications, no signs or symptoms of hypertension ,hypotension or orthostatic hypotension. Patient is compliant with medications. Blood workup related to hypertensive diagnosis reviewed. Plan is to continue with the present management. We will follow-up as a scheduled and adjust the doses of the medication as indicated. Hypercholesterolemia follow-up with the lipid panel next office visit  To complain his problems with the sleep she can fall asleep but she wake up in 3 to 4 hours and then she toss and turn and does not fall asleep again sometimes she does this is going on for a long time she is requesting to increase the Ambien to 10 mg at this point I do not think so that still at good idea and also it is not approved by the FDA  I told her to take lorazepam 1 mg or half a pill when she wake up in the middle of the night and see how she does with that  Anxiety and depression is a still a problem but it is better than before and also the lack of energy. Recommended her to get the sleep study.   The following portions of the patient's history were reviewed and updated as appropriate: allergies, current medications, past family history, past medical history, past social history, past surgical history and problem list.    Review of Systems   Constitutional: Positive for fatigue. Negative for chills. HENT: Negative for congestion, ear pain, hearing loss, postnasal drip, sinus pressure, sore throat and voice change. Eyes: Negative for pain, discharge and visual disturbance. Respiratory: Negative for cough, chest tightness and shortness of breath. Cardiovascular: Negative for chest pain, palpitations and leg swelling. Gastrointestinal: Negative for abdominal pain, blood in stool, diarrhea, nausea and rectal pain. Genitourinary: Negative for difficulty urinating, dysuria and urgency. Musculoskeletal: Negative for arthralgias and joint swelling. Skin: Negative for rash. Allergic/Immunologic: Negative for environmental allergies and food allergies. Neurological: Negative for dizziness, tremors, weakness, numbness and headaches. Hematological: Negative for adenopathy. Psychiatric/Behavioral: Positive for sleep disturbance. Negative for behavioral problems and hallucinations. The patient is nervous/anxious.           Past Medical History:   Diagnosis Date   • Anxiety     last assessed 02/18/14   • Arthritis    • Depression    • Endometriosis    • Generalized osteoarthritis of multiple sites     last assessed 02/18/14   • HL (hearing loss) (   • Hyperlipidemia    • Hypertension    • Left knee pain    • Major depression, single episode     mental status change   • Pelvic adhesions    • Squamous cell skin cancer     resolved 09/2007; back         Current Outpatient Medications:   •  cephalexin (KEFLEX) 500 mg capsule, TAKE 4 CAPSULES ONE HOUR BEFORE DENTAL WORK, Disp: , Rfl:   •  escitalopram (LEXAPRO) 10 mg tablet, Take 1 tablet (10 mg total) by mouth daily, Disp: 90 tablet, Rfl: 1  •  ezetimibe (ZETIA) 10 mg tablet, Take 1 tablet (10 mg total) by mouth daily, Disp: 90 tablet, Rfl: 1  •  ketoconazole (NIZORAL) 2 % cream, APPLY TO AFFECTED AREAS OF SKIN UP TO TWICE DAILY, AS NEEDED WITH FLARE UPS, Disp: , Rfl:   •  LORazepam (ATIVAN) 1 mg tablet, Take 1 tablet (1 mg total) by mouth daily as needed for anxiety, Disp: 30 tablet, Rfl: 5  •  mometasone (ELOCON) 0.1 % cream, APPLY TO AFFECTED AREAS TWICE DAILY FOR TWO WEEKS ON, ONE WEEK OFF THEN AS NEEDED WITH FLARES, Disp: , Rfl:   •  nadolol (CORGARD) 80 MG tablet, Take 1 tablet (80 mg total) by mouth daily, Disp: 90 tablet, Rfl: 1  •  naloxone (NARCAN) 4 mg/0.1 mL nasal spray, Administer 1 spray into a nostril.  If no response after 2-3 minutes, give another dose in the other nostril using a new spray., Disp: 1 each, Rfl: 1  •  nystatin (MYCOSTATIN) powder, Apply topically 2 (two) times a day, Disp: 60 g, Rfl: 3  •  simvastatin (ZOCOR) 40 mg tablet, Take 1 tablet (40 mg total) by mouth daily, Disp: 90 tablet, Rfl: 1  •  traMADol (ULTRAM) 50 mg tablet, Take 1 tablet (50 mg total) by mouth every 8 (eight) hours as needed (prn), Disp: 90 tablet, Rfl: 5  •  zolpidem (AMBIEN) 5 mg tablet, Take 1 tablet (5 mg total) by mouth daily at bedtime as needed (prn), Disp: 30 tablet, Rfl: 5  •  cyclobenzaprine (FLEXERIL) 5 mg tablet, Take 1 tablet (5 mg total) by mouth 3 (three) times a day as needed for muscle spasms, Disp: 30 tablet, Rfl: 1    Allergies   Allergen Reactions   • Aspirin GI Intolerance   • Codeine Nausea Only, GI Intolerance and Vomiting     Derivatives   • Oxycodone-Acetaminophen Vomiting and GI Intolerance     darvocet  Percocet Tabs       Social History   Past Surgical History:   Procedure Laterality Date   • APPENDECTOMY     • BREAST BIOPSY Left 07/01/2005    stereotactic; percutaneous needle core use imag guide   • BREAST EXCISIONAL BIOPSY Left    • CHOLECYSTECTOMY     • COLONOSCOPY     • PILONIDAL CYST EXCISION     • TOTAL ABDOMINAL HYSTERECTOMY Bilateral 1994    age 38   • TOTAL ABDOMINAL HYSTERECTOMY W/ BILATERAL SALPINGOOPHORECTOMY Bilateral 1994    age 38     Family History   Problem Relation Age of Onset   • Breast cancer Mother         lumpectomy   • Hypertension Mother    • Thyroid disease Mother    • Cancer Mother         abdominal mass   • Lung cancer Father    • Lung cancer Paternal Aunt    • Esophageal cancer Paternal Aunt    • Kidney nephrosis Son    • Hypertension Son    • No Known Problems Maternal Grandmother    • No Known Problems Maternal Grandfather    • Dementia Paternal Grandmother    • Leukemia Paternal Grandfather    • No Known Problems Son    • Breast cancer Maternal Aunt    • No Known Problems Maternal Aunt    • No Known Problems Maternal Aunt        Objective:  /72 (BP Location: Left arm, Patient Position: Sitting, Cuff Size: Large)   Pulse (!) 53   Temp 97.9 °F (36.6 °C) (Temporal)   Ht 5' 1" (1.549 m)   Wt 86.6 kg (191 lb)   SpO2 97% Comment: room air  BMI 36.09 kg/m²        Physical Exam  Constitutional:       Appearance: She is well-developed. She is obese. HENT:      Right Ear: External ear normal.      Nose: Nose normal.   Eyes:      Conjunctiva/sclera: Conjunctivae normal.      Pupils: Pupils are equal, round, and reactive to light. Neck:      Thyroid: No thyromegaly. Vascular: No JVD. Cardiovascular:      Rate and Rhythm: Normal rate and regular rhythm. Heart sounds: Murmur heard. Systolic murmur is present with a grade of 1/6. Pulmonary:      Breath sounds: Normal breath sounds. Abdominal:      General: Bowel sounds are normal.      Palpations: Abdomen is soft. Musculoskeletal:         General: Normal range of motion. Cervical back: Normal range of motion. Lymphadenopathy:      Cervical: No cervical adenopathy. Skin:     General: Skin is dry. Neurological:      Mental Status: She is alert and oriented to person, place, and time. Deep Tendon Reflexes: Reflexes are normal and symmetric.    Psychiatric:         Behavior: Behavior normal.

## 2023-10-04 ENCOUNTER — APPOINTMENT (OUTPATIENT)
Dept: LAB | Age: 73
End: 2023-10-04
Payer: MEDICARE

## 2023-10-04 DIAGNOSIS — E55.9 VITAMIN D DEFICIENCY: ICD-10-CM

## 2023-10-04 DIAGNOSIS — I10 HYPERTENSION, UNSPECIFIED TYPE: ICD-10-CM

## 2023-10-04 DIAGNOSIS — D69.6 THROMBOCYTOPENIA (HCC): ICD-10-CM

## 2023-10-04 DIAGNOSIS — E78.00 HYPERCHOLESTEREMIA: ICD-10-CM

## 2023-10-04 DIAGNOSIS — I10 BENIGN ESSENTIAL HYPERTENSION: ICD-10-CM

## 2023-10-04 DIAGNOSIS — E55.9 VITAMIN D DEFICIENCY: Primary | ICD-10-CM

## 2023-10-04 LAB
25(OH)D3 SERPL-MCNC: 8.7 NG/ML (ref 30–100)
ALBUMIN SERPL BCP-MCNC: 3.6 G/DL (ref 3.5–5)
ALP SERPL-CCNC: 74 U/L (ref 34–104)
ALT SERPL W P-5'-P-CCNC: 14 U/L (ref 7–52)
ANION GAP SERPL CALCULATED.3IONS-SCNC: 9 MMOL/L
AST SERPL W P-5'-P-CCNC: 22 U/L (ref 13–39)
BASOPHILS # BLD AUTO: 0.03 THOUSANDS/ÂΜL (ref 0–0.1)
BASOPHILS NFR BLD AUTO: 1 % (ref 0–1)
BILIRUB SERPL-MCNC: 0.65 MG/DL (ref 0.2–1)
BILIRUB UR QL STRIP: NEGATIVE
BUN SERPL-MCNC: 9 MG/DL (ref 5–25)
CALCIUM SERPL-MCNC: 8.9 MG/DL (ref 8.4–10.2)
CHLORIDE SERPL-SCNC: 105 MMOL/L (ref 96–108)
CHOLEST SERPL-MCNC: 124 MG/DL
CLARITY UR: NORMAL
CO2 SERPL-SCNC: 28 MMOL/L (ref 21–32)
COLOR UR: NORMAL
CREAT SERPL-MCNC: 0.73 MG/DL (ref 0.6–1.3)
EOSINOPHIL # BLD AUTO: 0.07 THOUSAND/ÂΜL (ref 0–0.61)
EOSINOPHIL NFR BLD AUTO: 1 % (ref 0–6)
ERYTHROCYTE [DISTWIDTH] IN BLOOD BY AUTOMATED COUNT: 13 % (ref 11.6–15.1)
GFR SERPL CREATININE-BSD FRML MDRD: 81 ML/MIN/1.73SQ M
GLUCOSE P FAST SERPL-MCNC: 96 MG/DL (ref 65–99)
GLUCOSE UR STRIP-MCNC: NEGATIVE MG/DL
HCT VFR BLD AUTO: 39.3 % (ref 34.8–46.1)
HDLC SERPL-MCNC: 53 MG/DL
HGB BLD-MCNC: 13.2 G/DL (ref 11.5–15.4)
HGB UR QL STRIP.AUTO: NEGATIVE
IMM GRANULOCYTES # BLD AUTO: 0.02 THOUSAND/UL (ref 0–0.2)
IMM GRANULOCYTES NFR BLD AUTO: 0 % (ref 0–2)
KETONES UR STRIP-MCNC: NEGATIVE MG/DL
LDLC SERPL CALC-MCNC: 51 MG/DL (ref 0–100)
LEUKOCYTE ESTERASE UR QL STRIP: NEGATIVE
LYMPHOCYTES # BLD AUTO: 2.01 THOUSANDS/ÂΜL (ref 0.6–4.47)
LYMPHOCYTES NFR BLD AUTO: 38 % (ref 14–44)
MCH RBC QN AUTO: 29.9 PG (ref 26.8–34.3)
MCHC RBC AUTO-ENTMCNC: 33.6 G/DL (ref 31.4–37.4)
MCV RBC AUTO: 89 FL (ref 82–98)
MONOCYTES # BLD AUTO: 0.32 THOUSAND/ÂΜL (ref 0.17–1.22)
MONOCYTES NFR BLD AUTO: 6 % (ref 4–12)
NEUTROPHILS # BLD AUTO: 2.88 THOUSANDS/ÂΜL (ref 1.85–7.62)
NEUTS SEG NFR BLD AUTO: 54 % (ref 43–75)
NITRITE UR QL STRIP: NEGATIVE
NONHDLC SERPL-MCNC: 71 MG/DL
NRBC BLD AUTO-RTO: 0 /100 WBCS
PH UR STRIP.AUTO: 7 [PH]
PLATELET # BLD AUTO: 114 THOUSANDS/UL (ref 149–390)
PMV BLD AUTO: 10.6 FL (ref 8.9–12.7)
POTASSIUM SERPL-SCNC: 3.6 MMOL/L (ref 3.5–5.3)
PROT SERPL-MCNC: 6.3 G/DL (ref 6.4–8.4)
PROT UR STRIP-MCNC: NEGATIVE MG/DL
RBC # BLD AUTO: 4.42 MILLION/UL (ref 3.81–5.12)
SODIUM SERPL-SCNC: 142 MMOL/L (ref 135–147)
SP GR UR STRIP.AUTO: 1.01 (ref 1–1.03)
TRIGL SERPL-MCNC: 99 MG/DL
TSH SERPL DL<=0.05 MIU/L-ACNC: 3.42 UIU/ML (ref 0.45–4.5)
UROBILINOGEN UR STRIP-ACNC: <2 MG/DL
WBC # BLD AUTO: 5.33 THOUSAND/UL (ref 4.31–10.16)

## 2023-10-04 PROCEDURE — 80053 COMPREHEN METABOLIC PANEL: CPT

## 2023-10-04 PROCEDURE — 36415 COLL VENOUS BLD VENIPUNCTURE: CPT

## 2023-10-04 PROCEDURE — 82306 VITAMIN D 25 HYDROXY: CPT

## 2023-10-04 PROCEDURE — 84443 ASSAY THYROID STIM HORMONE: CPT

## 2023-10-04 PROCEDURE — 85025 COMPLETE CBC W/AUTO DIFF WBC: CPT

## 2023-10-04 PROCEDURE — 80061 LIPID PANEL: CPT

## 2023-10-04 RX ORDER — ERGOCALCIFEROL 1.25 MG/1
50000 CAPSULE ORAL WEEKLY
Qty: 12 CAPSULE | Refills: 0 | Status: SHIPPED | OUTPATIENT
Start: 2023-10-04

## 2023-10-12 ENCOUNTER — OFFICE VISIT (OUTPATIENT)
Dept: INTERNAL MEDICINE CLINIC | Age: 73
End: 2023-10-12
Payer: MEDICARE

## 2023-10-12 VITALS
OXYGEN SATURATION: 98 % | TEMPERATURE: 98.3 F | WEIGHT: 192 LBS | HEIGHT: 61 IN | BODY MASS INDEX: 36.25 KG/M2 | SYSTOLIC BLOOD PRESSURE: 140 MMHG | HEART RATE: 65 BPM | DIASTOLIC BLOOD PRESSURE: 86 MMHG

## 2023-10-12 DIAGNOSIS — E78.00 HYPERCHOLESTEROLEMIA: ICD-10-CM

## 2023-10-12 DIAGNOSIS — D69.6 THROMBOCYTOPENIA (HCC): ICD-10-CM

## 2023-10-12 DIAGNOSIS — R73.01 IMPAIRED FASTING GLUCOSE: ICD-10-CM

## 2023-10-12 DIAGNOSIS — F11.20 CONTINUOUS OPIOID DEPENDENCE (HCC): ICD-10-CM

## 2023-10-12 DIAGNOSIS — I10 BENIGN ESSENTIAL HYPERTENSION: ICD-10-CM

## 2023-10-12 DIAGNOSIS — E66.01 OBESITY, MORBID (HCC): ICD-10-CM

## 2023-10-12 DIAGNOSIS — G47.00 INSOMNIA, UNSPECIFIED TYPE: ICD-10-CM

## 2023-10-12 DIAGNOSIS — E55.9 VITAMIN D DEFICIENCY: Primary | ICD-10-CM

## 2023-10-12 PROBLEM — E83.51 HYPOCALCEMIA: Status: RESOLVED | Noted: 2018-04-09 | Resolved: 2023-10-12

## 2023-10-12 PROCEDURE — 99214 OFFICE O/P EST MOD 30 MIN: CPT | Performed by: INTERNAL MEDICINE

## 2023-10-12 NOTE — PROGRESS NOTES
Assessment/Plan:     Diagnoses and all orders for this visit:    Vitamin D deficiency  -     Vitamin D 25 hydroxy; Futuresignificant problem with the vitamin D deficiency we will follow-up  Benign essential hypertension  Good hypertension  Hypercholesterolemia  -     US right upper quadrant; Future  Lipid panel was excellent  Impaired fasting glucose  Fasting blood sugar was good  Insomnia, unspecified type  Stable  Thrombocytopenia ([address])  -     US right upper quadrant; Future  Liver enzymes are normal thrombocytopenia I will check her ultrasound to rule out fatty liver disease  Obesity, morbid ([address])  -     US right upper quadrant; Future    Continuous opioid dependence [facility])           M*Modal software was used to dictate this note. It may contain errors with dictating incorrect words or incorrect spelling. Please contact the provider directly with any questions. Subjective:   Chief Complaint   Patient presents with    Follow-up     Last labs 10/04/2023        Patient ID: Marylu Rodrigues is a 75 y.o. female. HPI  A pleasant 75 years young lady who is here today for the regular follow-up she is doing well review of system is essentially unremarkable  Hypertension could be better controlled systolic blood pressure is 140 we will follow  hypercholesterolemia very well controlled.   Blood work-up reviewed  Thrombocytopenia no symptoms will follow-up just get the ultrasound to make sure it is nothing to do with her any problems with the liver or the spleen  Anxiety and depression is very well controlled  Takes multiple medication for her insomnia she never took these kind of medication before 1 incidence happened to her  The following portions of the patient's history were reviewed and updated as appropriate: allergies, current medications, past family history, past medical history, past social history, past surgical history, and problem list.    Review of Systems   Constitutional:  Negative for chills and fever.   HENT:  Negative for ear pain and sore throat. Eyes:  Negative for pain and visual disturbance. Respiratory:  Negative for cough and shortness of breath. Cardiovascular:  Negative for chest pain and palpitations. Gastrointestinal:  Negative for abdominal pain and vomiting. Genitourinary:  Negative for dysuria and hematuria. Musculoskeletal:  Negative for arthralgias and back pain. Skin:  Negative for color change and rash. Neurological:  Negative for seizures and syncope. All other systems reviewed and are negative.         Past Medical History:   Diagnosis Date    Anxiety     last assessed 02/18/14    Arthritis     Depression     Endometriosis     Generalized osteoarthritis of multiple sites     last assessed 02/18/14    HL (hearing loss) (    Hyperlipidemia     Hypertension     Left knee pain     Major depression, single episode     mental status change    Pelvic adhesions     Squamous cell skin cancer     resolved 09/2007; back         Current Outpatient Medications:     cephalexin (KEFLEX) 500 mg capsule, TAKE 4 CAPSULES ONE HOUR BEFORE DENTAL WORK, Disp: , Rfl:     ergocalciferol (VITAMIN D2) 50,000 units, Take 1 capsule (50,000 Units total) by mouth once a week, Disp: 12 capsule, Rfl: 0    escitalopram (LEXAPRO) 10 mg tablet, Take 1 tablet (10 mg total) by mouth daily, Disp: 90 tablet, Rfl: 1    ezetimibe (ZETIA) 10 mg tablet, Take 1 tablet (10 mg total) by mouth daily, Disp: 90 tablet, Rfl: 1    ketoconazole (NIZORAL) 2 % cream, APPLY TO AFFECTED AREAS OF SKIN UP TO TWICE DAILY, AS NEEDED WITH FLARE UPS, Disp: , Rfl:     LORazepam (ATIVAN) 1 mg tablet, Take 1 tablet (1 mg total) by mouth daily as needed for anxiety, Disp: 30 tablet, Rfl: 5    mometasone (ELOCON) 0.1 % cream, APPLY TO AFFECTED AREAS TWICE DAILY FOR TWO WEEKS ON, ONE WEEK OFF THEN AS NEEDED WITH FLARES, Disp: , Rfl:     nadolol (CORGARD) 80 MG tablet, Take 1 tablet (80 mg total) by mouth daily, Disp: 90 tablet, Rfl: 1    naloxone (NARCAN) 4 mg/0.1 mL nasal spray, Administer 1 spray into a nostril.  If no response after 2-3 minutes, give another dose in the other nostril using a new spray., Disp: 1 each, Rfl: 1    nystatin (MYCOSTATIN) powder, Apply topically 2 (two) times a day, Disp: 60 g, Rfl: 3    simvastatin (ZOCOR) 40 mg tablet, Take 1 tablet (40 mg total) by mouth daily, Disp: 90 tablet, Rfl: 1    traMADol (ULTRAM) 50 mg tablet, Take 1 tablet (50 mg total) by mouth every 8 (eight) hours as needed (prn), Disp: 90 tablet, Rfl: 5    zolpidem (AMBIEN) 5 mg tablet, Take 1 tablet (5 mg total) by mouth daily at bedtime as needed (prn), Disp: 30 tablet, Rfl: 5    Allergies   Allergen Reactions    Aspirin GI Intolerance    Codeine Nausea Only, GI Intolerance and Vomiting     Derivatives    Oxycodone-Acetaminophen Vomiting and GI Intolerance     darvocet  Percocet Tabs       Social History   Past Surgical History:   Procedure Laterality Date    APPENDECTOMY      BREAST BIOPSY Left 07/01/2005    stereotactic; percutaneous needle core use imag guide    BREAST EXCISIONAL BIOPSY Left     CHOLECYSTECTOMY      COLONOSCOPY      JOINT REPLACEMENT      PILONIDAL CYST EXCISION      TOTAL ABDOMINAL HYSTERECTOMY Bilateral 1994    age 38    TOTAL ABDOMINAL HYSTERECTOMY W/ BILATERAL SALPINGOOPHORECTOMY Bilateral 1994    age 38     Family History   Problem Relation Age of Onset    Breast cancer Mother         lumpectomy    Hypertension Mother     Thyroid disease Mother     Cancer Mother         abdominal mass    Lung cancer Father     Lung cancer Paternal Aunt     Esophageal cancer Paternal Aunt     Kidney nephrosis Son     Hypertension Son     No Known Problems Maternal Grandmother     No Known Problems Maternal Grandfather     Dementia Paternal Grandmother     Leukemia Paternal Grandfather     No Known Problems Son     Breast cancer Maternal Aunt     No Known Problems Maternal Aunt     No Known Problems Maternal Aunt        Objective:  /86 (BP Location: Left arm, Patient Position: Sitting, Cuff Size: Adult)   Pulse 65   Temp 98.3 °F (36.8 °C) (Temporal)   Ht 5' 1" (1.549 m)   Wt 87.1 kg (192 lb)   SpO2 98% Comment: ra  BMI 36.28 kg/m²        Physical Exam  Vitals and nursing note reviewed. Constitutional:       Appearance: She is well-developed. She is obese. HENT:      Right Ear: Tympanic membrane and external ear normal.      Left Ear: Tympanic membrane normal.   Eyes:      Conjunctiva/sclera: Conjunctivae normal.      Pupils: Pupils are equal, round, and reactive to light. Neck:      Thyroid: No thyromegaly. Vascular: No JVD. Cardiovascular:      Rate and Rhythm: Normal rate and regular rhythm. Heart sounds: Normal heart sounds. Pulmonary:      Breath sounds: Normal breath sounds. Abdominal:      General: Bowel sounds are normal.      Palpations: Abdomen is soft. Musculoskeletal:         General: Normal range of motion. Cervical back: Normal range of motion. Lymphadenopathy:      Cervical: No cervical adenopathy. Skin:     General: Skin is dry. Neurological:      General: No focal deficit present. Mental Status: She is alert and oriented to person, place, and time. Mental status is at baseline. Deep Tendon Reflexes: Reflexes are normal and symmetric.    Psychiatric:         Mood and Affect: Mood normal.         Behavior: Behavior normal.

## 2023-11-28 ENCOUNTER — TELEPHONE (OUTPATIENT)
Dept: INTERNAL MEDICINE CLINIC | Age: 73
End: 2023-11-28

## 2023-11-28 NOTE — TELEPHONE ENCOUNTER
Received prior authorization for medication:      traMADol (ULTRAM) 50 mg tablet     Scanning into media and sending to [facility]

## 2024-01-01 DIAGNOSIS — G47.00 INSOMNIA, UNSPECIFIED TYPE: ICD-10-CM

## 2024-01-02 DIAGNOSIS — F41.9 ANXIETY: ICD-10-CM

## 2024-01-02 DIAGNOSIS — G47.00 INSOMNIA, UNSPECIFIED TYPE: ICD-10-CM

## 2024-01-02 RX ORDER — ZOLPIDEM TARTRATE 5 MG/1
5 TABLET ORAL
Qty: 30 TABLET | Refills: 0 | Status: SHIPPED | OUTPATIENT
Start: 2024-01-02

## 2024-01-02 RX ORDER — LORAZEPAM 1 MG/1
1 TABLET ORAL DAILY PRN
Qty: 30 TABLET | Refills: 0 | Status: CANCELLED | OUTPATIENT
Start: 2024-01-02

## 2024-01-02 RX ORDER — ZOLPIDEM TARTRATE 5 MG/1
5 TABLET ORAL
Qty: 30 TABLET | Refills: 5 | Status: SHIPPED | OUTPATIENT
Start: 2024-01-02

## 2024-01-25 ENCOUNTER — RA CDI HCC (OUTPATIENT)
Dept: OTHER | Facility: HOSPITAL | Age: 74
End: 2024-01-25

## 2024-01-25 ENCOUNTER — APPOINTMENT (OUTPATIENT)
Dept: LAB | Age: 74
End: 2024-01-25
Payer: MEDICARE

## 2024-01-25 DIAGNOSIS — E55.9 VITAMIN D DEFICIENCY: ICD-10-CM

## 2024-01-25 LAB — 25(OH)D3 SERPL-MCNC: 26.8 NG/ML (ref 30–100)

## 2024-01-25 PROCEDURE — 36415 COLL VENOUS BLD VENIPUNCTURE: CPT

## 2024-01-25 PROCEDURE — 82306 VITAMIN D 25 HYDROXY: CPT

## 2024-02-01 ENCOUNTER — OFFICE VISIT (OUTPATIENT)
Dept: INTERNAL MEDICINE CLINIC | Age: 74
End: 2024-02-01
Payer: MEDICARE

## 2024-02-01 VITALS
HEART RATE: 54 BPM | TEMPERATURE: 97.8 F | WEIGHT: 188 LBS | SYSTOLIC BLOOD PRESSURE: 128 MMHG | BODY MASS INDEX: 35.5 KG/M2 | DIASTOLIC BLOOD PRESSURE: 78 MMHG | OXYGEN SATURATION: 96 % | HEIGHT: 61 IN

## 2024-02-01 DIAGNOSIS — I10 BENIGN ESSENTIAL HYPERTENSION: Primary | ICD-10-CM

## 2024-02-01 DIAGNOSIS — D69.6 THROMBOCYTOPENIA (HCC): ICD-10-CM

## 2024-02-01 DIAGNOSIS — G47.00 INSOMNIA, UNSPECIFIED TYPE: ICD-10-CM

## 2024-02-01 DIAGNOSIS — R73.01 IMPAIRED FASTING GLUCOSE: ICD-10-CM

## 2024-02-01 DIAGNOSIS — F11.20 CONTINUOUS OPIOID DEPENDENCE (HCC): ICD-10-CM

## 2024-02-01 DIAGNOSIS — F41.9 ANXIETY: ICD-10-CM

## 2024-02-01 DIAGNOSIS — E66.01 OBESITY, MORBID (HCC): ICD-10-CM

## 2024-02-01 DIAGNOSIS — F32.A DEPRESSION, UNSPECIFIED DEPRESSION TYPE: ICD-10-CM

## 2024-02-01 PROCEDURE — 99215 OFFICE O/P EST HI 40 MIN: CPT | Performed by: INTERNAL MEDICINE

## 2024-02-01 RX ORDER — ZOLPIDEM TARTRATE 5 MG/1
5 TABLET ORAL
Qty: 30 TABLET | Refills: 5 | Status: SHIPPED | OUTPATIENT
Start: 2024-02-01

## 2024-02-01 RX ORDER — LORAZEPAM 1 MG/1
1 TABLET ORAL DAILY PRN
Qty: 30 TABLET | Refills: 5 | Status: SHIPPED | OUTPATIENT
Start: 2024-02-01

## 2024-02-01 RX ORDER — CEPHALEXIN 500 MG/1
2000 CAPSULE ORAL ONCE
Qty: 4 CAPSULE | Refills: 0 | Status: SHIPPED | OUTPATIENT
Start: 2024-02-01 | End: 2024-02-01

## 2024-02-01 NOTE — PROGRESS NOTES
Bri Haas is a 72 year old female presenting for   Chief Complaint   Patient presents with    Office Visit    Medicare Wellness Visit     Denies Latex allergy or sensitivity.    Medication verified, no changes  Refills needed today: No    Health Maintenance Due   Topic Date Due    Hepatitis B Vaccine (For Physician/APC Discussion) (2 of 3 - 19+ 3-dose series) 04/20/2001    Hepatitis C Screening  Never done    COVID-19 Vaccine (4 - 2023-24 season) 09/01/2023    Traditional Medicare- Medicare Wellness Visit  12/15/2023       Patient is due for topics listed above, she wishes to proceed with MWV (Medicare Wellness Visit), but is not proceeding with Immunization(s) COVID-19 and Hep B and Hepatitis C Screening at this time. The following has occurred: Education provided for Immunization(s) COVID-19 and Hep B and Hepatitis C Screening.      Unaddressed Risk Adjusted HCC Categories and Diagnoses  HCC 39 - Bone/Joint/Muscle Infections/Necrosis  - Unaddressed Dx:Septic Joint Of Right Knee Joint (Cmd)  HCC 40 - Rheumatoid Arthritis and Inflammatory Connective Tissue Disease  - Unaddressed Dx:Keratoconjunctivitis Sicca (Cmd)      Last lab results:   Hemoglobin A1C (%)   Date Value   12/03/2019 5.3     Cholesterol (mg/dL)   Date Value   11/07/2023 139     HDL (mg/dL)   Date Value   11/07/2023 71     Triglycerides (mg/dL)   Date Value   11/07/2023 49     LDL (mg/dL)   Date Value   11/07/2023 58     No results found for: \"URMIC\", \"UCR\", \"MALBCR\"  No results found for: \"IFOB\"     5.) Do you do moderate to strenuous exercise (brisk walk) for about 20 minutes for 3 or more days per week?: No, I usually do not exercise this much     6 b.) How many servings of High Fiber / Whole Grain Foods to you have each day ( 1 serving = 1 cup cold cereal, 1/2 cup cooked cereal, 1 slice bread): 1 per day     7a.) Have you had a fall in the past year?: Yes     7b.) Do you feel unsteady when standing or walking?: Yes     7c.) Do you worry  Name: Mariam Nevarez      : 1950      MRN: [id number]  Encounter Provider: Devora Schaeffer MD  Encounter Date: 2024   Encounter department: [facility]    Assessment & Plan     1. Benign essential hypertension  Hypertension is very regular  2. Insomnia, unspecified type  -     zolpidem (AMBIEN) 5 mg tablet; Take 1 tablet (5 mg total) by mouth daily at bedtime as needed (prn)  -     cephalexin (KEFLEX) 500 mg capsule; Take 4 capsules (2,000 mg total) by mouth 1 (one) time for 1 dose  And with these medication she does not have good sleep at night  3. Anxiety  -     LORazepam (ATIVAN) 1 mg tablet; Take 1 tablet (1 mg total) by mouth daily as needed for anxiety  -     cephalexin (KEFLEX) 500 mg capsule; Take 4 capsules (2,000 mg total) by mouth 1 (one) time for 1 dose  Continue with the present management  4. Continuous opioid dependence (HCC)  Tramadol only as needed uses the tramadol for her knee pain or back pain and her arthralgias and myalgias  5. Thrombocytopenia (HCC)  Thrombocytopenia is a stable  6. Obesity, morbid (HCC)  Patient did lose some weight she was 192 now she is 188  7. Depression, unspecified depression type  Anxiety and depression is stable   8. Impaired fasting glucose  Will follow the fasting blood sugar         Subjective     This is a very pleasant 73 years young lady who is here today for the regular follow-up    Anxiety and depression is a stable with the medication but her insomnia is variable most of the night she sleeps well with the medication but some nights she cannot sleep even for few hours and then she feel exhausted most of the things are because of the anxiety.    Patient is here today for the follow-up.   Hypertension. I reviewed antihypertensive medication, patient does not have any side effects of  medications, no signs or symptoms of hypertension ,hypotension or orthostatic hypotension.  Patient is compliant with medications.   Blood workup related to hypertensive diagnosis reviewed.  Plan is to continue with the present management.  We will follow-up as a scheduled and adjust the doses of the medication as indicated.    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 Negative for chills.   HENT:  Negative for congestion, ear pain, hearing loss, postnasal drip, sinus pressure, sore throat and voice change.    Eyes:  Negative for pain, discharge and visual disturbance.   Respiratory:  Negative for cough, chest tightness and shortness of breath.    Cardiovascular:  Negative for chest pain, palpitations and leg swelling.   Gastrointestinal:  Negative for abdominal pain, blood in stool, diarrhea, nausea and rectal pain.   Genitourinary:  Negative for difficulty urinating, dysuria and urgency.   Musculoskeletal:  Positive for back pain and myalgias. Negative for arthralgias and joint swelling.   Skin:  Negative for rash.   Allergic/Immunologic: Negative for environmental allergies and food allergies.   Neurological:  Negative for dizziness, tremors, weakness, numbness and headaches.   Hematological:  Negative for adenopathy.   Psychiatric/Behavioral:  Positive for sleep disturbance (Even with the 5 mg of Ambien and lorazepam she slept only 2 hours last night this is the biggest problem). Negative for behavioral problems and hallucinations.        Past Medical History:   Diagnosis Date    Anxiety     last assessed 02/18/14    Arthritis     Depression     Endometriosis     Generalized osteoarthritis of multiple  about falling?: Yes     11a.) Bladder Control problems (urine leaking): Always     11b.) Bowel control problems: Sometimes     11d.) Bodily pain: Often     11f.) Feeling stressed or overwhelmed: Often     11g.) Anger or frustration: Often         Depression Screening:  Review Flowsheet  More data exists         12/15/2023   PHQ 2/9 Score   Adult PHQ 2 Score 0   Adult PHQ 2 Interpretation No further screening needed   Little interest or pleasure in activity? Not at all   Feeling down, depressed or hopeless? Not at all        Advance Directives:  discussed and advised the patient to complete one      sites     last assessed 02/18/14    HL (hearing loss) (    Hyperlipidemia     Hypertension     Left knee pain     Major depression, single episode     mental status change    Pelvic adhesions     Squamous cell skin cancer     resolved 09/2007; back     Past Surgical History:   Procedure Laterality Date    APPENDECTOMY      BREAST BIOPSY Left 07/01/2005    stereotactic; percutaneous needle core use imag guide    BREAST EXCISIONAL BIOPSY Left     CHOLECYSTECTOMY      COLONOSCOPY      JOINT REPLACEMENT      PILONIDAL CYST EXCISION      TOTAL ABDOMINAL HYSTERECTOMY Bilateral 1994    age 44    TOTAL ABDOMINAL HYSTERECTOMY W/ BILATERAL SALPINGOOPHORECTOMY Bilateral 1994    age 44     Family History   Problem Relation Age of Onset    Breast cancer Mother 60        lumpectomy    Hypertension Mother     Thyroid disease Mother     Cancer Mother 92        abdominal mass    Lung cancer Father     Lung cancer Paternal Aunt     Esophageal cancer Paternal Aunt     Kidney nephrosis Son     Hypertension Son     No Known Problems Maternal Grandmother     No Known Problems Maternal Grandfather     Dementia Paternal Grandmother     Leukemia Paternal Grandfather     No Known Problems Son     Breast cancer Maternal Aunt 70    No Known Problems Maternal Aunt     No Known Problems Maternal Aunt      Social History     Socioeconomic History    Marital status: /Civil Union     Spouse name: None    Number of children: 2    Years of education: None    Highest education level: None   Occupational History    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   Tobacco Use    Smoking status: Never    Smokeless tobacco: Never   Vaping Use    Vaping status: Never Used   Substance and Sexual Activity    Alcohol use: Not Currently     Alcohol/week: 1.0 standard drink of alcohol     Types: 1 Glasses of wine per week     Comment: rarely consumes    Drug use: No    Sexual activity: Yes     Partners: Male     Birth control/protection:  Surgical   Other Topics Concern    None   Social History Narrative    Always uses seat belt    Caffeine use    Daily coffee consumption (2 cups/day)    Exercise habits: Walking 5x per week    Feels safe at home    Lives with spouse     Social Determinants of Health     Financial Resource Strain: Not on file   Food Insecurity: Not on file   Transportation Needs: No Transportation Needs (3/7/2023)    PRAPARE - Transportation     Lack of Transportation (Medical): No     Lack of Transportation (Non-Medical): No   Physical Activity: Not on file   Stress: Not on file   Social Connections: Not on file   Intimate Partner Violence: Not on file   Housing Stability: Not on file     Current Outpatient Medications on File Prior to Visit   Medication Sig    ergocalciferol (VITAMIN D2) 50,000 units Take 1 capsule (50,000 Units total) by mouth once a week    escitalopram (LEXAPRO) 10 mg tablet Take 1 tablet (10 mg total) by mouth daily    ezetimibe (ZETIA) 10 mg tablet Take 1 tablet (10 mg total) by mouth daily    ketoconazole (NIZORAL) 2 % cream APPLY TO AFFECTED AREAS OF SKIN UP TO TWICE DAILY, AS NEEDED WITH FLARE UPS    mometasone (ELOCON) 0.1 % cream APPLY TO AFFECTED AREAS TWICE DAILY FOR TWO WEEKS ON, ONE WEEK OFF THEN AS NEEDED WITH FLARES    nadolol (CORGARD) 80 MG tablet Take 1 tablet (80 mg total) by mouth daily    naloxone (NARCAN) 4 mg/0.1 mL nasal spray Administer 1 spray into a nostril. If no response after 2-3 minutes, give another dose in the other nostril using a new spray.    nystatin (MYCOSTATIN) powder Apply topically 2 (two) times a day    simvastatin (ZOCOR) 40 mg tablet Take 1 tablet (40 mg total) by mouth daily    traMADol (ULTRAM) 50 mg tablet Take 1 tablet (50 mg total) by mouth every 8 (eight) hours as needed (prn)    [DISCONTINUED] LORazepam (ATIVAN) 1 mg tablet Take 1 tablet (1 mg total) by mouth daily as needed for anxiety    [DISCONTINUED] zolpidem (AMBIEN) 5 mg tablet Take 1 tablet (5 mg total) by  "mouth daily at bedtime as needed (prn)    [DISCONTINUED] zolpidem (AMBIEN) 5 mg tablet Take 1 tablet (5 mg total) by mouth daily at bedtime as needed (prn)     Allergies   Allergen Reactions    Aspirin GI Intolerance    Codeine Nausea Only, GI Intolerance and Vomiting     Derivatives    Oxycodone-Acetaminophen Vomiting and GI Intolerance     darvocet  Percocet Tabs     Immunization History   Administered Date(s) Administered    COVID-19 PFIZER VACCINE 0.3 ML IM 03/05/2021, 03/24/2021, 11/06/2021    COVID-19 Pfizer Vac BIVALENT Joe-sucrose 12 Yr+ IM 10/20/2022    COVID-19 Pfizer vac (Joe-sucrose, gray cap) 12 yr+ IM 05/03/2022    H1N1, All Formulations 01/13/2010    INFLUENZA 11/28/2006, 10/19/2007, 09/28/2018, 10/08/2019, 09/28/2020, 09/30/2021, 10/11/2022, 10/09/2023    Influenza Split High Dose Preservative Free IM 09/29/2014, 10/26/2015, 10/08/2019    Influenza, high dose seasonal 0.7 mL 10/08/2019, 09/28/2020    Influenza, seasonal, injectable 1950, 11/19/2012, 10/06/2016, 10/16/2017    Pneumococcal Conjugate 13-Valent 09/28/2018    Pneumococcal Polysaccharide PPV23 06/13/2014    Respiratory Syncytial Virus Vaccine (Recombinant, Adjuvanted) 09/21/2023    TD (adult) Preservative Free 06/14/2022    Td (adult), adsorbed 06/14/2022       Objective     /78 (BP Location: Left arm, Patient Position: Sitting, Cuff Size: Standard)   Pulse (!) 54   Temp 97.8 °F (36.6 °C) (Temporal)   Ht 5' 1\" (1.549 m)   Wt 85.3 kg (188 lb)   SpO2 96%   BMI 35.52 kg/m²     Physical Exam  Constitutional:       Appearance: She is well-developed. She is obese.   HENT:      Right Ear: Tympanic membrane and external ear normal.      Left Ear: Tympanic membrane normal.   Eyes:      Conjunctiva/sclera: Conjunctivae normal.      Pupils: Pupils are equal, round, and reactive to light.   Neck:      Thyroid: No thyromegaly.      Vascular: No JVD.   Cardiovascular:      Rate and Rhythm: Normal rate and regular rhythm.      Heart " sounds: Murmur heard.      Systolic murmur is present with a grade of 1/6.   Pulmonary:      Breath sounds: Normal breath sounds.   Abdominal:      General: Bowel sounds are normal.      Palpations: Abdomen is soft.   Musculoskeletal:         General: Normal range of motion.      Cervical back: Normal range of motion.      Right lower leg: No edema.      Left lower leg: No edema.   Lymphadenopathy:      Cervical: No cervical adenopathy.   Skin:     General: Skin is dry.   Neurological:      General: No focal deficit present.      Mental Status: She is alert and oriented to person, place, and time. Mental status is at baseline.      Deep Tendon Reflexes: Reflexes are normal and symmetric.   Psychiatric:         Mood and Affect: Mood normal.         Behavior: Behavior normal.       Devora Schaeffer MD

## 2024-06-06 ENCOUNTER — OFFICE VISIT (OUTPATIENT)
Dept: INTERNAL MEDICINE CLINIC | Age: 74
End: 2024-06-06
Payer: MEDICARE

## 2024-06-06 ENCOUNTER — APPOINTMENT (OUTPATIENT)
Dept: LAB | Age: 74
End: 2024-06-06
Payer: MEDICARE

## 2024-06-06 VITALS
WEIGHT: 194 LBS | DIASTOLIC BLOOD PRESSURE: 78 MMHG | TEMPERATURE: 97.8 F | HEART RATE: 61 BPM | OXYGEN SATURATION: 97 % | SYSTOLIC BLOOD PRESSURE: 140 MMHG | BODY MASS INDEX: 36.63 KG/M2 | HEIGHT: 61 IN

## 2024-06-06 DIAGNOSIS — R35.0 FREQUENCY OF URINATION: ICD-10-CM

## 2024-06-06 DIAGNOSIS — N64.9 DISORDER OF BREAST, UNSPECIFIED: ICD-10-CM

## 2024-06-06 DIAGNOSIS — B37.2 CANDIDAL INTERTRIGO: ICD-10-CM

## 2024-06-06 DIAGNOSIS — M25.562 CHRONIC PAIN OF BOTH KNEES: ICD-10-CM

## 2024-06-06 DIAGNOSIS — F41.9 ANXIETY: ICD-10-CM

## 2024-06-06 DIAGNOSIS — D69.6 THROMBOCYTOPENIA (HCC): ICD-10-CM

## 2024-06-06 DIAGNOSIS — N34.2 INFECTIVE URETHRITIS: Primary | ICD-10-CM

## 2024-06-06 DIAGNOSIS — Z12.31 ENCOUNTER FOR SCREENING MAMMOGRAM FOR MALIGNANT NEOPLASM OF BREAST: Primary | ICD-10-CM

## 2024-06-06 DIAGNOSIS — N34.2 INFECTIVE URETHRITIS: ICD-10-CM

## 2024-06-06 DIAGNOSIS — G89.29 CHRONIC PAIN OF BOTH KNEES: ICD-10-CM

## 2024-06-06 DIAGNOSIS — M25.561 CHRONIC PAIN OF BOTH KNEES: ICD-10-CM

## 2024-06-06 DIAGNOSIS — I10 HYPERTENSION, UNSPECIFIED TYPE: ICD-10-CM

## 2024-06-06 DIAGNOSIS — E78.00 HYPERCHOLESTEREMIA: ICD-10-CM

## 2024-06-06 DIAGNOSIS — F11.90 CHRONIC NARCOTIC USE: ICD-10-CM

## 2024-06-06 DIAGNOSIS — Z78.0 POSTMENOPAUSAL: ICD-10-CM

## 2024-06-06 DIAGNOSIS — M19.90 ARTHRITIS: ICD-10-CM

## 2024-06-06 LAB
BACTERIA UR QL AUTO: ABNORMAL /HPF
BILIRUB UR QL STRIP: NEGATIVE
CLARITY UR: CLEAR
COLOR UR: ABNORMAL
GLUCOSE UR STRIP-MCNC: NEGATIVE MG/DL
HGB UR QL STRIP.AUTO: NEGATIVE
KETONES UR STRIP-MCNC: NEGATIVE MG/DL
LEUKOCYTE ESTERASE UR QL STRIP: ABNORMAL
MUCOUS THREADS UR QL AUTO: ABNORMAL
NITRITE UR QL STRIP: NEGATIVE
NON-SQ EPI CELLS URNS QL MICRO: ABNORMAL /HPF
PH UR STRIP.AUTO: 6 [PH]
PROT UR STRIP-MCNC: NEGATIVE MG/DL
RBC #/AREA URNS AUTO: ABNORMAL /HPF
RENAL EPI CELLS #/AREA URNS HPF: PRESENT /[HPF]
SP GR UR STRIP.AUTO: 1.01 (ref 1–1.03)
UROBILINOGEN UR STRIP-ACNC: <2 MG/DL
WBC #/AREA URNS AUTO: ABNORMAL /HPF

## 2024-06-06 PROCEDURE — G0439 PPPS, SUBSEQ VISIT: HCPCS | Performed by: INTERNAL MEDICINE

## 2024-06-06 PROCEDURE — 99214 OFFICE O/P EST MOD 30 MIN: CPT | Performed by: INTERNAL MEDICINE

## 2024-06-06 PROCEDURE — 81001 URINALYSIS AUTO W/SCOPE: CPT

## 2024-06-06 RX ORDER — NADOLOL 80 MG/1
80 TABLET ORAL DAILY
Qty: 90 TABLET | Refills: 1 | Status: SHIPPED | OUTPATIENT
Start: 2024-06-06

## 2024-06-06 RX ORDER — NALOXONE HYDROCHLORIDE 4 MG/.1ML
SPRAY NASAL
Qty: 1 EACH | Refills: 1 | Status: SHIPPED | OUTPATIENT
Start: 2024-06-06 | End: 2025-06-06

## 2024-06-06 RX ORDER — NYSTATIN 100000 [USP'U]/G
POWDER TOPICAL 2 TIMES DAILY
Qty: 60 G | Refills: 3 | Status: SHIPPED | OUTPATIENT
Start: 2024-06-06

## 2024-06-06 RX ORDER — SIMVASTATIN 40 MG
40 TABLET ORAL DAILY
Qty: 90 TABLET | Refills: 1 | Status: SHIPPED | OUTPATIENT
Start: 2024-06-06

## 2024-06-06 RX ORDER — EZETIMIBE 10 MG/1
10 TABLET ORAL DAILY
Qty: 90 TABLET | Refills: 1 | Status: SHIPPED | OUTPATIENT
Start: 2024-06-06

## 2024-06-06 RX ORDER — CEPHALEXIN 500 MG/1
500 CAPSULE ORAL EVERY 8 HOURS SCHEDULED
Qty: 9 CAPSULE | Refills: 0 | Status: SHIPPED | OUTPATIENT
Start: 2024-06-06 | End: 2024-06-06 | Stop reason: SDUPTHER

## 2024-06-06 RX ORDER — ESCITALOPRAM OXALATE 10 MG/1
10 TABLET ORAL DAILY
Qty: 90 TABLET | Refills: 1 | Status: SHIPPED | OUTPATIENT
Start: 2024-06-06

## 2024-06-06 RX ORDER — TRAMADOL HYDROCHLORIDE 50 MG/1
50 TABLET ORAL EVERY 8 HOURS PRN
Qty: 90 TABLET | Refills: 5 | Status: SHIPPED | OUTPATIENT
Start: 2024-06-06 | End: 2025-06-06

## 2024-06-06 RX ORDER — CEPHALEXIN 500 MG/1
500 CAPSULE ORAL EVERY 8 HOURS SCHEDULED
Qty: 9 CAPSULE | Refills: 0 | Status: SHIPPED | OUTPATIENT
Start: 2024-06-06 | End: 2024-06-09

## 2024-06-06 RX ORDER — LORAZEPAM 1 MG/1
1 TABLET ORAL DAILY PRN
Qty: 30 TABLET | Refills: 5 | Status: SHIPPED | OUTPATIENT
Start: 2024-06-06

## 2024-06-06 NOTE — PROGRESS NOTES
Ambulatory Visit  Name: Mariam Nevarez      : 1950      MRN: [id number]  Encounter Provider: Devora Schaeffer MD  Encounter Date: 2024   Encounter department: [facility] BATH    Assessment & Plan   1. Encounter for screening mammogram for malignant neoplasm of breast  -     Mammo diagnostic bilateral w 3d & cad; Future  2. Thrombocytopenia (HCC)  -     traMADol (ULTRAM) 50 mg tablet; Take 1 tablet (50 mg total) by mouth every 8 (eight) hours as needed (prn)  3. Arthritis  -     traMADol (ULTRAM) 50 mg tablet; Take 1 tablet (50 mg total) by mouth every 8 (eight) hours as needed (prn)  4. Chronic pain of both knees  -     traMADol (ULTRAM) 50 mg tablet; Take 1 tablet (50 mg total) by mouth every 8 (eight) hours as needed (prn)  5. Hypercholesteremia  -     simvastatin (ZOCOR) 40 mg tablet; Take 1 tablet (40 mg total) by mouth daily  -     ezetimibe (ZETIA) 10 mg tablet; Take 1 tablet (10 mg total) by mouth daily  6. Candidal intertrigo  -     nystatin (MYCOSTATIN) powder; Apply topically 2 (two) times a day  7. Hypertension, unspecified type  -     nadolol (CORGARD) 80 MG tablet; Take 1 tablet (80 mg total) by mouth daily  8. Anxiety  -     escitalopram (LEXAPRO) 10 mg tablet; Take 1 tablet (10 mg total) by mouth daily  -     LORazepam (ATIVAN) 1 mg tablet; Take 1 tablet (1 mg total) by mouth daily as needed for anxiety  9. Chronic narcotic use  -     naloxone (NARCAN) 4 mg/0.1 mL nasal spray; Administer 1 spray into a nostril. If no response after 2-3 minutes, give another dose in the other nostril using a new spray.  10. Disorder of breast, unspecified  -     Mammo diagnostic bilateral w 3d & cad; Future  11. Frequency of urination  -     UA w Reflex to Microscopic w Reflex to Culture; Future       Preventive health issues were discussed with patient, and age appropriate screening tests were ordered as noted in patient's After Visit Summary. Personalized health advice and  appropriate referrals for health education or preventive services given if needed, as noted in patient's After Visit Summary.    History of Present Illness     This is a very pleasant 74 years young lady who is here today for the regular follow-up also for the Medicare wellness visit she is doing very well with the Medicare wellness parameters.  Wanted to go for the mammogram although I at the age of 74 we do not recommend mammogram since the previous mammograms were all normal but she wanted to go 1 more time that is no problem we will can order it.    Patient is here today for the follow-up.   Hypertension. I reviewed antihypertensive medication, patient does not have any side effects of  medications, no signs or symptoms of hypertension ,hypotension or orthostatic hypotension.  Patient is compliant with medications.  Blood workup related to hypertensive diagnosis reviewed.  Plan is to continue with the present management.  We will follow-up as a scheduled and adjust the doses of the medication as indicated.  Blood pressure could be little better systolic is 140 diastolic is normal continue with the present medication    Chronic problem with insomnia anxiety and depression there is stable patient is on multiple medications no problem with abuse of the medications.  She is not able to wean these medications of she does not have any side effects of the medications    Obesity her BMI is 36 she tried to lose weight but unfortunately she is not able to lose much weight because of her problems with the arthritis and the joint pains she cannot do much exercises    She is complaining of frequency of urination and some incontinence this is going on for few days she is concerned about urinary tract infection I will get the UA and CNS for her before she goes home and go from there.    Hypercholesterolemia continue with the present medications we will follow the lipid panel.    L thrombocytopenia with no symptoms no problems  with the bleeding problem.  No other hematological abnormalities continue to observe and follow last blood workup was done in October 2023    Hypertension  Pertinent negatives include no chest pain, headaches, palpitations or shortness of breath.      Patient Care Team:  Devora Schaeffer MD as PCP - General    Review of Systems   Constitutional:  Positive for fatigue. Negative for chills.   HENT:  Negative for congestion, ear pain, hearing loss, postnasal drip, sinus pressure, sore throat and voice change.    Eyes:  Negative for pain, discharge and visual disturbance.   Respiratory:  Negative for cough, chest tightness and shortness of breath.    Cardiovascular:  Negative for chest pain, palpitations and leg swelling.   Gastrointestinal:  Negative for abdominal pain, blood in stool, diarrhea, nausea and rectal pain.   Genitourinary:  Positive for frequency and urgency. Negative for difficulty urinating and dysuria.   Musculoskeletal:  Positive for arthralgias and back pain. Negative for joint swelling.   Skin:  Negative for rash.   Allergic/Immunologic: Negative for environmental allergies and food allergies.   Neurological:  Negative for dizziness, tremors, weakness, numbness and headaches.   Hematological:  Negative for adenopathy.   Psychiatric/Behavioral:  Negative for behavioral problems and hallucinations.      Medical History Reviewed by provider this encounter:       Annual Wellness Visit Questionnaire   Mariam is here for her Subsequent Wellness visit. Last Medicare Wellness visit information reviewed, patient interviewed and updates made to the record today.      Health Risk Assessment:   Patient rates overall health as good. Patient feels that their physical health rating is same. Patient is very satisfied with their life. Eyesight was rated as same. Hearing was rated as same. Patient feels that their emotional and mental health rating is same. Patients states they are never, rarely angry. Patient states  they are never, rarely unusually tired/fatigued. Pain experienced in the last 7 days has been none. Patient states that she has experienced no weight loss or gain in last 6 months.     Depression Screening:   PHQ-9 Score: 1      Fall Risk Screening:   In the past year, patient has experienced: no history of falling in past year      Urinary Incontinence Screening:   Patient has leaked urine accidently in the last six months.     Home Safety:  Patient does not have trouble with stairs inside or outside of their home. Patient has working smoke alarms and has working carbon monoxide detector. Home safety hazards include: none.     Nutrition:   Current diet is Regular.     Medications:   Patient is not currently taking any over-the-counter supplements. Patient is able to manage medications.     Activities of Daily Living (ADLs)/Instrumental Activities of Daily Living (IADLs):   Walk and transfer into and out of bed and chair?: Yes  Dress and groom yourself?: Yes    Bathe or shower yourself?: Yes    Feed yourself? Yes  Do your laundry/housekeeping?: Yes  Manage your money, pay your bills and track your expenses?: Yes  Make your own meals?: Yes    Do your own shopping?: Yes    Previous Hospitalizations:   Any hospitalizations or ED visits within the last 12 months?: No      Advance Care Planning:   Living will: Yes    Durable POA for healthcare: Yes    Advanced directive: Yes    Advanced directive counseling given: Yes      Cognitive Screening:   Provider or family/friend/caregiver concerned regarding cognition?: No    PREVENTIVE SCREENINGS      Cardiovascular Screening:    General: Screening Not Indicated and History Lipid Disorder      Diabetes Screening:     General: Screening Current      Colorectal Cancer Screening:     General: Screening Current      Breast Cancer Screening:     General: Screening Current      Cervical Cancer Screening:    General: Screening Not Indicated and Risks and Benefits Discussed       Osteoporosis Screening:    General: Risks and Benefits Discussed    Due for: DXA Axial      Abdominal Aortic Aneurysm (AAA) Screening:        General: Risks and Benefits Discussed and Screening Current      Lung Cancer Screening:     General: Screening Not Indicated      Hepatitis C Screening:    General: Screening Current    Screening, Brief Intervention, and Referral to Treatment (SBIRT)    Screening  Typical number of drinks in a day: 0  Typical number of drinks in a week: 0  Interpretation: Low risk drinking behavior.    AUDIT-C Screenin) How often did you have a drink containing alcohol in the past year? monthly or less  2) How many drinks did you have on a typical day when you were drinking in the past year? 1 to 2  3) How often did you have 6 or more drinks on one occasion in the past year? never    AUDIT-C Score: 1  Interpretation: Score 0-2 (female): Negative screen for alcohol misuse    Single Item Drug Screening:  How often have you used an illegal drug (including marijuana) or a prescription medication for non-medical reasons in the past year? never    Single Item Drug Screen Score: 0  Interpretation: Negative screen for possible drug use disorder    Review of Current Opioid Use    Opioid Risk Tool (ORT) Interpretation: Complete Opioid Risk Tool (ORT)    Other Counseling Topics:   Car/seat belt/driving safety and calcium and vitamin D intake and regular weightbearing exercise.     Social Determinants of Health     Food Insecurity: No Food Insecurity (2024)    Hunger Vital Sign     Worried About Running Out of Food in the Last Year: Never true     Ran Out of Food in the Last Year: Never true   Transportation Needs: No Transportation Needs (2024)    PRAPARE - Transportation     Lack of Transportation (Medical): No     Lack of Transportation (Non-Medical): No   Housing Stability: Unknown (2024)    Housing Stability Vital Sign     Unable to Pay for Housing in the Last Year: No     Homeless  "in the Last Year: No   Utilities: Not At Risk (6/6/2024)    [facility] Utilities     Threatened with loss of utilities: No     No results found.    Objective     /78 (BP Location: Left arm, Patient Position: Sitting, Cuff Size: Large)   Pulse 61   Temp 97.8 °F (36.6 °C) (Temporal)   Ht 5' 1\" (1.549 m)   Wt 88 kg (194 lb)   SpO2 97%   BMI 36.66 kg/m²     Physical Exam  Constitutional:       Appearance: She is obese.   HENT:      Head: Normocephalic.   Eyes:      Pupils: Pupils are equal, round, and reactive to light.   Cardiovascular:      Rate and Rhythm: Normal rate and regular rhythm.      Heart sounds: No murmur heard.  Pulmonary:      Breath sounds: Normal breath sounds.   Abdominal:      General: Bowel sounds are normal.      Palpations: Abdomen is soft.   Musculoskeletal:      Cervical back: Normal range of motion.   Skin:     General: Skin is warm.   Neurological:      Mental Status: She is alert and oriented to person, place, and time.   Psychiatric:         Behavior: Behavior normal.         Thought Content: Thought content normal.       Administrative Statements           "

## 2024-06-06 NOTE — PATIENT INSTRUCTIONS
Medicare Preventive Visit Patient Instructions  Thank you for completing your Welcome to Medicare Visit or Medicare Annual Wellness Visit today. Your next wellness visit will be due in one year (6/7/2025).  The screening/preventive services that you may require over the next 5-10 years are detailed below. Some tests may not apply to you based off risk factors and/or age. Screening tests ordered at today's visit but not completed yet may show as past due. Also, please note that scanned in results may not display below.  Preventive Screenings:  Service Recommendations Previous Testing/Comments   Colorectal Cancer Screening  * Colonoscopy    * Fecal Occult Blood Test (FOBT)/Fecal Immunochemical Test (FIT)  * Fecal DNA/Cologuard Test  * Flexible Sigmoidoscopy Age: 45-75 years old   Colonoscopy: every 10 years (may be performed more frequently if at higher risk)  OR  FOBT/FIT: every 1 year  OR  Cologuard: every 3 years  OR  Sigmoidoscopy: every 5 years  Screening may be recommended earlier than age 45 if at higher risk for colorectal cancer. Also, an individualized decision between you and your healthcare provider will decide whether screening between the ages of 76-85 would be appropriate. Colonoscopy: 08/05/2021  FOBT/FIT: Not on file  Cologuard: Not on file  Sigmoidoscopy: Not on file    Screening Current     Breast Cancer Screening Age: 40+ years old  Frequency: every 1-2 years  Not required if history of left and right mastectomy Mammogram: 06/09/2023    Screening Current   Cervical Cancer Screening Between the ages of 21-29, pap smear recommended once every 3 years.   Between the ages of 30-65, can perform pap smear with HPV co-testing every 5 years.   Recommendations may differ for women with a history of total hysterectomy, cervical cancer, or abnormal pap smears in past. Pap Smear: 01/18/2019    Screening Not Indicated   Hepatitis C Screening Once for adults born between 1945 and 1965  More frequently in  patients at high risk for Hepatitis C Hep C Antibody: 12/18/2019    Screening Current   Diabetes Screening 1-2 times per year if you're at risk for diabetes or have pre-diabetes Fasting glucose: 96 mg/dL (10/4/2023)  A1C: 5.4 % (9/8/2022)  Screening Current   Cholesterol Screening Once every 5 years if you don't have a lipid disorder. May order more often based on risk factors. Lipid panel: 10/04/2023    Screening Not Indicated  History Lipid Disorder     Other Preventive Screenings Covered by Medicare:  Abdominal Aortic Aneurysm (AAA) Screening: covered once if your at risk. You're considered to be at risk if you have a family history of AAA.  Lung Cancer Screening: covers low dose CT scan once per year if you meet all of the following conditions: (1) Age 55-77; (2) No signs or symptoms of lung cancer; (3) Current smoker or have quit smoking within the last 15 years; (4) You have a tobacco smoking history of at least 20 pack years (packs per day multiplied by number of years you smoked); (5) You get a written order from a healthcare provider.  Glaucoma Screening: covered annually if you're considered high risk: (1) You have diabetes OR (2) Family history of glaucoma OR (3)  aged 50 and older OR (4)  American aged 65 and older  Osteoporosis Screening: covered every 2 years if you meet one of the following conditions: (1) You're estrogen deficient and at risk for osteoporosis based off medical history and other findings; (2) Have a vertebral abnormality; (3) On glucocorticoid therapy for more than 3 months; (4) Have primary hyperparathyroidism; (5) On osteoporosis medications and need to assess response to drug therapy.   Last bone density test (DXA Scan): 11/04/2010.  HIV Screening: covered annually if you're between the age of 15-65. Also covered annually if you are younger than 15 and older than 65 with risk factors for HIV infection. For pregnant patients, it is covered up to 3 times per  pregnancy.    Immunizations:  Immunization Recommendations   Influenza Vaccine Annual influenza vaccination during flu season is recommended for all persons aged >= 6 months who do not have contraindications   Pneumococcal Vaccine   * Pneumococcal conjugate vaccine = PCV13 (Prevnar 13), PCV15 (Vaxneuvance), PCV20 (Prevnar 20)  * Pneumococcal polysaccharide vaccine = PPSV23 (Pneumovax) Adults 19-63 yo with certain risk factors or if 65+ yo  If never received any pneumonia vaccine: recommend Prevnar 20 (PCV20)  Give PCV20 if previously received 1 dose of PCV13 or PPSV23   Hepatitis B Vaccine 3 dose series if at intermediate or high risk (ex: diabetes, end stage renal disease, liver disease)   Respiratory syncytial virus (RSV) Vaccine - COVERED BY MEDICARE PART D  * RSVPreF3 (Arexvy) CDC recommends that adults 60 years of age and older may receive a single dose of RSV vaccine using shared clinical decision-making (SCDM)   Tetanus (Td) Vaccine - COST NOT COVERED BY MEDICARE PART B Following completion of primary series, a booster dose should be given every 10 years to maintain immunity against tetanus. Td may also be given as tetanus wound prophylaxis.   Tdap Vaccine - COST NOT COVERED BY MEDICARE PART B Recommended at least once for all adults. For pregnant patients, recommended with each pregnancy.   Shingles Vaccine (Shingrix) - COST NOT COVERED BY MEDICARE PART B  2 shot series recommended in those 19 years and older who have or will have weakened immune systems or those 50 years and older     Health Maintenance Due:      Topic Date Due   • Breast Cancer Screening: Mammogram  06/09/2024   • Colorectal Cancer Screening  08/04/2026   • Hepatitis C Screening  Completed     Immunizations Due:      Topic Date Due   • COVID-19 Vaccine (6 - 2023-24 season) 09/01/2023     Advance Directives   What are advance directives?  Advance directives are legal documents that state your wishes and plans for medical care. These plans  are made ahead of time in case you lose your ability to make decisions for yourself. Advance directives can apply to any medical decision, such as the treatments you want, and if you want to donate organs.   What are the types of advance directives?  There are many types of advance directives, and each state has rules about how to use them. You may choose a combination of any of the following:  Living will:  This is a written record of the treatment you want. You can also choose which treatments you do not want, which to limit, and which to stop at a certain time. This includes surgery, medicine, IV fluid, and tube feedings.   Durable power of  for healthcare (DPAHC):  This is a written record that states who you want to make healthcare choices for you when you are unable to make them for yourself. This person, called a proxy, is usually a family member or a friend. You may choose more than 1 proxy.  Do not resuscitate (DNR) order:  A DNR order is used in case your heart stops beating or you stop breathing. It is a request not to have certain forms of treatment, such as CPR. A DNR order may be included in other types of advance directives.  Medical directive:  This covers the care that you want if you are in a coma, near death, or unable to make decisions for yourself. You can list the treatments you want for each condition. Treatment may include pain medicine, surgery, blood transfusions, dialysis, IV or tube feedings, and a ventilator (breathing machine).  Values history:  This document has questions about your views, beliefs, and how you feel and think about life. This information can help others choose the care that you would choose.  Why are advance directives important?  An advance directive helps you control your care. Although spoken wishes may be used, it is better to have your wishes written down. Spoken wishes can be misunderstood, or not followed. Treatments may be given even if you do not want  them. An advance directive may make it easier for your family to make difficult choices about your care.   Urinary Incontinence   Urinary incontinence (UI)  is when you lose control of your bladder. UI develops because your bladder cannot store or empty urine properly. The 3 most common types of UI are stress incontinence, urge incontinence, or both.  Medicines:   May be given to help strengthen your bladder control. Report any side effects of medication to your healthcare provider.  Do pelvic muscle exercises often:  Your pelvic muscles help you stop urinating. Squeeze these muscles tight for 5 seconds, then relax for 5 seconds. Gradually work up to squeezing for 10 seconds. Do 3 sets of 15 repetitions a day, or as directed. This will help strengthen your pelvic muscles and improve bladder control.  Train your bladder:  Go to the bathroom at set times, such as every 2 hours, even if you do not feel the urge to go. You can also try to hold your urine when you feel the urge to go. For example, hold your urine for 5 minutes when you feel the urge to go. As that becomes easier, hold your urine for 10 minutes.   Self-care:   Keep a UI record.  Write down how often you leak urine and how much you leak. Make a note of what you were doing when you leaked urine.  Drink liquids as directed. You may need to limit the amount of liquid you drink to help control your urine leakage. Do not drink any liquid right before you go to bed. Limit or do not have drinks that contain caffeine or alcohol.   Prevent constipation.  Eat a variety of high-fiber foods. Good examples are high-fiber cereals, beans, vegetables, and whole-grain breads. Walking is the best way to trigger your intestines to have a bowel movement.  Exercise regularly and maintain a healthy weight.  Weight loss and exercise will decrease pressure on your bladder and help you control your leakage.   Use a catheter as directed  to help empty your bladder. A catheter is a  tiny, plastic tube that is put into your bladder to drain your urine.   Go to behavior therapy as directed.  Behavior therapy may be used to help you learn to control your urge to urinate.    Weight Management   Why it is important to manage your weight:  Being overweight increases your risk of health conditions such as heart disease, high blood pressure, type 2 diabetes, and certain types of cancer. It can also increase your risk for osteoarthritis, sleep apnea, and other respiratory problems. Aim for a slow, steady weight loss. Even a small amount of weight loss can lower your risk of health problems.  How to lose weight safely:  A safe and healthy way to lose weight is to eat fewer calories and get regular exercise. You can lose up about 1 pound a week by decreasing the number of calories you eat by 500 calories each day.   Healthy meal plan for weight management:  A healthy meal plan includes a variety of foods, contains fewer calories, and helps you stay healthy. A healthy meal plan includes the following:  Eat whole-grain foods more often.  A healthy meal plan should contain fiber. Fiber is the part of grains, fruits, and vegetables that is not broken down by your body. Whole-grain foods are healthy and provide extra fiber in your diet. Some examples of whole-grain foods are whole-wheat breads and pastas, oatmeal, brown rice, and bulgur.  Eat a variety of vegetables every day.  Include dark, leafy greens such as spinach, kale, damien greens, and mustard greens. Eat yellow and orange vegetables such as carrots, sweet potatoes, and winter squash.   Eat a variety of fruits every day.  Choose fresh or canned fruit (canned in its own juice or light syrup) instead of juice. Fruit juice has very little or no fiber.  Eat low-fat dairy foods.  Drink fat-free (skim) milk or 1% milk. Eat fat-free yogurt and low-fat cottage cheese. Try low-fat cheeses such as mozzarella and other reduced-fat cheeses.  Choose meat and  other protein foods that are low in fat.  Choose beans or other legumes such as split peas or lentils. Choose fish, skinless poultry (chicken or turkey), or lean cuts of red meat (beef or pork). Before you cook meat or poultry, cut off any visible fat.   Use less fat and oil.  Try baking foods instead of frying them. Add less fat, such as margarine, sour cream, regular salad dressing and mayonnaise to foods. Eat fewer high-fat foods. Some examples of high-fat foods include french fries, doughnuts, ice cream, and cakes.  Eat fewer sweets.  Limit foods and drinks that are high in sugar. This includes candy, cookies, regular soda, and sweetened drinks.  Exercise:  Exercise at least 30 minutes per day on most days of the week. Some examples of exercise include walking, biking, dancing, and swimming. You can also fit in more physical activity by taking the stairs instead of the elevator or parking farther away from stores. Ask your healthcare provider about the best exercise plan for you.   Narcotic (Opioid) Safety    Use narcotics safely:  Take prescribed narcotics exactly as directed  Do not give narcotics to others or take narcotics that belong to someone else  Do not mix narcotics without medicines or alcohol  Do not drive or operate heavy machinery after you take the narcotic  Monitor for side effects and notify your healthcare provider if you experienced side effects such as nausea, sleepiness, itching, or trouble thinking clearly.    Manage constipation:    Constipation is the most common side effect of narcotic medicine. Constipation is when you have hard, dry bowel movements, or you go longer than usual between bowel movements. Tell your healthcare provider about all changes in your bowel movements while you are taking narcotics. He or she may recommend laxative medicine to help you have a bowel movement. He or she may also change the kind of narcotic you are taking, or change when you take it. The following are  more ways you can prevent or relieve constipation:    Drink liquids as directed.  You may need to drink extra liquids to help soften and move your bowels. Ask how much liquid to drink each day and which liquids are best for you.  Eat high-fiber foods.  This may help decrease constipation by adding bulk to your bowel movements. High-fiber foods include fruits, vegetables, whole-grain breads and cereals, and beans. Your healthcare provider or dietitian can help you create a high-fiber meal plan. Your provider may also recommend a fiber supplement if you cannot get enough fiber from food.  Exercise regularly.  Regular physical activity can help stimulate your intestines. Walking is a good exercise to prevent or relieve constipation. Ask which exercises are best for you.  Schedule a time each day to have a bowel movement.  This may help train your body to have regular bowel movements. Bend forward while you are on the toilet to help move the bowel movement out. Sit on the toilet for at least 10 minutes, even if you do not have a bowel movement.    Store narcotics safely:   Store narcotics where others cannot easily get them.  Keep them in a locked cabinet or secure area. Do not  keep them in a purse or other bag you carry with you. A person may be looking for something else and find the narcotics.  Make sure narcotics are stored out of the reach of children.  A child can easily overdose on narcotics. Narcotics may look like candy to a small child.    The best way to dispose of narcotics:      The laws vary by country and area. In the [place], the best way is to return the narcotics through a take-back program. This program is offered by the [place] Drug Enforcement Agency (NISHA). The following are options for using the program:  Take the narcotics to a NISHA collection site.  The site is often a law enforcement center. Call your local law enforcement center for scheduled take-back days in your area. You will be given  information on where to go if the collection site is in a different location.  Take the narcotics to an approved pharmacy or hospital.  A pharmacy or hospital may be set up as a collection site. You will need to ask if it is a NISHA collection site if you were not directed there. A pharmacy or doctor's office may not be able to take back narcotics unless it is a NISHA site.  Use a mail-back system.  This means you are given containers to put the narcotics into. You will then mail them in the containers.  Use a take-back drop box.  This is a place to leave the narcotics at any time. People and animals will not be able to get into the box. Your local law enforcement agency can tell you where to find a drop box in your area.    Other ways to manage pain:   Ask your healthcare provider about non-narcotic medicines to control pain.  Nonprescription medicines include NSAIDs (such as ibuprofen) and acetaminophen. Prescription medicines include muscle relaxers, antidepressants, and steroids.  Pain may be managed without any medicines.  Some ways to relieve pain include massage, aromatherapy, or meditation. Physical or occupational therapy may also help.    For more information:   Drug Enforcement Administration  [address] [place] [address]  Phone: 5- 448 - 354-8823  Web Address: https://www.deadiversion.[facility].gov/drug_disposal/    US Food and Drug Administration  [address] [place] [address]  Phone: 2- 347 - 402-3480  Web Address: http://www.fda.gov     © Copyright [employer] 2018 Information is for End User's use only and may not be sold, redistributed or otherwise used for commercial purposes. All illustrations and images included in CareNotes® are the copyrighted property of [employer].D.A[employer]M., Inc. or [employer]

## 2024-06-07 ENCOUNTER — TELEPHONE (OUTPATIENT)
Age: 74
End: 2024-06-07

## 2024-06-07 NOTE — TELEPHONE ENCOUNTER
Reason for call:   [x] Prior Auth  [] Other:     Caller:  [x] Patient  [] Pharmacy  Name:   Address:   Callback Number:     Medication: traMADol (ULTRAM) 50 mg tablet     Dose/Frequency: Take 1 tablet (50 mg total) by mouth every 8 (eight) hours     Quantity: 90    Ordering Provider:   [x] PCP/Provider -   [] Speciality/Provider -

## 2024-06-11 ENCOUNTER — HOSPITAL ENCOUNTER (OUTPATIENT)
Dept: RADIOLOGY | Age: 74
Discharge: HOME | End: 2024-06-11
Payer: MEDICARE

## 2024-06-11 DIAGNOSIS — Z12.31 VISIT FOR SCREENING MAMMOGRAM: ICD-10-CM

## 2024-06-11 PROCEDURE — 77067 SCR MAMMO BI INCL CAD: CPT

## 2024-06-11 PROCEDURE — 77063 BREAST TOMOSYNTHESIS BI: CPT

## 2024-06-17 ENCOUNTER — TELEPHONE (OUTPATIENT)
Age: 74
End: 2024-06-17

## 2024-06-17 DIAGNOSIS — Z79.2 NEED FOR ANTIBIOTIC PROPHYLAXIS FOR DENTAL PROCEDURE: Primary | ICD-10-CM

## 2024-06-17 DIAGNOSIS — F41.9 ANXIETY: ICD-10-CM

## 2024-06-17 DIAGNOSIS — G47.00 INSOMNIA, UNSPECIFIED TYPE: ICD-10-CM

## 2024-06-17 RX ORDER — CEPHALEXIN 500 MG/1
2000 CAPSULE ORAL ONCE
Qty: 4 CAPSULE | Refills: 0 | Status: CANCELLED | OUTPATIENT
Start: 2024-06-17 | End: 2024-06-17

## 2024-06-17 RX ORDER — AMOXICILLIN 500 MG/1
CAPSULE ORAL
Qty: 12 CAPSULE | Refills: 1 | Status: SHIPPED | OUTPATIENT
Start: 2024-06-17 | End: 2025-06-17

## 2024-06-17 NOTE — TELEPHONE ENCOUNTER
PROVIDER OUT OF OFFICE      Patient states she take Kelfex (2,000 mg ) 1 hour prior to dental procedure-      Please advise, thank you

## 2024-06-17 NOTE — TELEPHONE ENCOUNTER
Patient called the RX Refill Line. Message is being forwarded to the office.     Patient is requesting an antibiotic needed prior to dental work. Patient will needs refill. She is holding off on scheduling the dental work until she gets the antibiotic      Please contact patient at [phone]

## 2024-06-20 NOTE — TELEPHONE ENCOUNTER
Spoke to Dr. Strong on Monday.     Patient aware. She states that she spoke to her ortho and he recommended that she takes the antibiotics prior to dental work.     Patient aware antibiotics were sent to [facility] and will pickup there.

## 2024-06-21 ENCOUNTER — TELEPHONE (OUTPATIENT)
Dept: INTERNAL MEDICINE CLINIC | Age: 74
End: 2024-06-21

## 2024-06-21 ENCOUNTER — TELEPHONE (OUTPATIENT)
Age: 74
End: 2024-06-21

## 2024-06-21 NOTE — TELEPHONE ENCOUNTER
[x] Prior Auth  [] Other:     Caller:  [] Patient  [x] Pharmacy ([facility] Rxform scanned under media)     [facility] - [place] - [address] [facility]  [address], [facility] [address]  Phone: [phone]  Fax: [phone]  NISHA #: --       Medication: traMADol (ULTRAM) 50 mg tablet     Dose/Frequency: Take 1 tablet (50 mg total) by mouth every 8 (eight) hours as needed (prn),     Quantity: 90 tabs    Ordering Provider:   [x] PCP/Provider -   [] Speciality/Provider -       Is the patient's insurance updated in EPIC?   [x] Yes   [] No     Is a copy of the patient's insurance scanned in EPIC?   [x] Yes   [] No

## 2024-06-21 NOTE — TELEPHONE ENCOUNTER
Received medication prior auth for       traMADol (ULTRAM) 50 mg tablet   Scanned into chart and sent to Pod

## 2024-06-22 NOTE — TELEPHONE ENCOUNTER
PA for [id number]    Submitted via    [x]CMM-KEY [id number]  []Surescripts-Case ID #   []Faxed to plan   []Other website   []Phone call Case ID #     Office notes sent, clinical questions answered. Awaiting determination    Turnaround time for your insurance to make a decision on your Prior Authorization can take 7-21 business days.

## 2024-07-08 DIAGNOSIS — G47.00 INSOMNIA, UNSPECIFIED TYPE: ICD-10-CM

## 2024-07-09 RX ORDER — ZOLPIDEM TARTRATE 5 MG/1
5 TABLET ORAL
Qty: 30 TABLET | Refills: 0 | Status: SHIPPED | OUTPATIENT
Start: 2024-07-09

## 2024-07-09 NOTE — TELEPHONE ENCOUNTER
Patient called stating that Zolpidem was called into pharmacy but there were no refills called in.  Patient stated she normally had five refills called in. Please advise.

## 2024-07-09 NOTE — TELEPHONE ENCOUNTER
Pt calling to confirm if refill request went through; advised yes and currently with Dr. Schaeffer for review.

## 2024-08-03 DIAGNOSIS — B37.2 CANDIDAL INTERTRIGO: ICD-10-CM

## 2024-08-05 RX ORDER — NYSTATIN 100000 [USP'U]/G
POWDER TOPICAL
Qty: 60 G | Refills: 0 | Status: SHIPPED | OUTPATIENT
Start: 2024-08-05

## 2024-08-06 DIAGNOSIS — G47.00 INSOMNIA, UNSPECIFIED TYPE: ICD-10-CM

## 2024-08-06 RX ORDER — ZOLPIDEM TARTRATE 5 MG/1
5 TABLET ORAL
Qty: 30 TABLET | Refills: 0 | Status: SHIPPED | OUTPATIENT
Start: 2024-08-06

## 2024-08-06 NOTE — TELEPHONE ENCOUNTER
Medication: Ambien    Dose/Frequency: 5mg 1 tab day     Quantity: 30    Pharmacy: [facility]    Office:   [x] PCP/Provider -   [] Speciality/Provider -     Does the patient have enough for 3 days?   [x] Yes   [] No - Send as HP to POD       Patient would like 3 refills of medication as well.

## 2024-09-03 DIAGNOSIS — G47.00 INSOMNIA, UNSPECIFIED TYPE: ICD-10-CM

## 2024-09-03 RX ORDER — ZOLPIDEM TARTRATE 5 MG/1
5 TABLET ORAL
Qty: 30 TABLET | Refills: 0 | Status: SHIPPED | OUTPATIENT
Start: 2024-09-03

## 2024-09-03 NOTE — TELEPHONE ENCOUNTER
Medication: zolpidem (AMBIEN)    Dose/Frequency: 5 mg, take 1 tablet by mouth daily at bedtime as needed    Quantity: 30    Pharmacy: [id number] - [address]    Office:   [x] PCP/Provider -   [] Speciality/Provider -     Does the patient have enough for 3 days?   [] Yes   [x] No - Send as HP to POD

## 2024-10-01 DIAGNOSIS — G47.00 INSOMNIA, UNSPECIFIED TYPE: ICD-10-CM

## 2024-10-01 RX ORDER — ZOLPIDEM TARTRATE 5 MG/1
5 TABLET ORAL
Qty: 30 TABLET | Refills: 0 | Status: SHIPPED | OUTPATIENT
Start: 2024-10-01

## 2024-10-01 NOTE — TELEPHONE ENCOUNTER
Medication: zolpidem (AMBIEN)     Dose/Frequency: 5 mg, take 1 tablet by mouth daily at bedtime as needed     Quantity: 30     Pharmacy: [id number] - [address]     Office:   [x] PCP/Provider - Dr Schaeffer  [] Speciality/Provider -      Does the patient have enough for 3 days?   [x] Yes   [] No - Send as HP to POD

## 2024-10-08 ENCOUNTER — RA CDI HCC (OUTPATIENT)
Dept: OTHER | Facility: HOSPITAL | Age: 74
End: 2024-10-08

## 2024-10-09 ENCOUNTER — APPOINTMENT (OUTPATIENT)
Dept: LAB | Age: 74
End: 2024-10-09
Payer: MEDICARE

## 2024-10-09 DIAGNOSIS — E78.00 HYPERCHOLESTEREMIA: ICD-10-CM

## 2024-10-09 DIAGNOSIS — I10 HYPERTENSION, UNSPECIFIED TYPE: ICD-10-CM

## 2024-10-09 DIAGNOSIS — D69.6 THROMBOCYTOPENIA (HCC): ICD-10-CM

## 2024-10-09 LAB
ANION GAP SERPL CALCULATED.3IONS-SCNC: 5 MMOL/L (ref 4–13)
BASOPHILS # BLD AUTO: 0.02 THOUSANDS/ΜL (ref 0–0.1)
BASOPHILS NFR BLD AUTO: 0 % (ref 0–1)
BUN SERPL-MCNC: 13 MG/DL (ref 5–25)
CALCIUM SERPL-MCNC: 8.2 MG/DL (ref 8.4–10.2)
CHLORIDE SERPL-SCNC: 106 MMOL/L (ref 96–108)
CHOLEST SERPL-MCNC: 127 MG/DL
CO2 SERPL-SCNC: 29 MMOL/L (ref 21–32)
CREAT SERPL-MCNC: 0.69 MG/DL (ref 0.6–1.3)
EOSINOPHIL # BLD AUTO: 0.11 THOUSAND/ΜL (ref 0–0.61)
EOSINOPHIL NFR BLD AUTO: 2 % (ref 0–6)
ERYTHROCYTE [DISTWIDTH] IN BLOOD BY AUTOMATED COUNT: 12.9 % (ref 11.6–15.1)
GFR SERPL CREATININE-BSD FRML MDRD: 85 ML/MIN/1.73SQ M
GLUCOSE P FAST SERPL-MCNC: 91 MG/DL (ref 65–99)
HCT VFR BLD AUTO: 37.1 % (ref 34.8–46.1)
HDLC SERPL-MCNC: 56 MG/DL
HGB BLD-MCNC: 12.2 G/DL (ref 11.5–15.4)
IMM GRANULOCYTES # BLD AUTO: 0.01 THOUSAND/UL (ref 0–0.2)
IMM GRANULOCYTES NFR BLD AUTO: 0 % (ref 0–2)
LDLC SERPL CALC-MCNC: 55 MG/DL (ref 0–100)
LYMPHOCYTES # BLD AUTO: 2.03 THOUSANDS/ΜL (ref 0.6–4.47)
LYMPHOCYTES NFR BLD AUTO: 36 % (ref 14–44)
MCH RBC QN AUTO: 29.7 PG (ref 26.8–34.3)
MCHC RBC AUTO-ENTMCNC: 32.9 G/DL (ref 31.4–37.4)
MCV RBC AUTO: 90 FL (ref 82–98)
MONOCYTES # BLD AUTO: 0.36 THOUSAND/ΜL (ref 0.17–1.22)
MONOCYTES NFR BLD AUTO: 6 % (ref 4–12)
NEUTROPHILS # BLD AUTO: 3.09 THOUSANDS/ΜL (ref 1.85–7.62)
NEUTS SEG NFR BLD AUTO: 56 % (ref 43–75)
NONHDLC SERPL-MCNC: 71 MG/DL
NRBC BLD AUTO-RTO: 0 /100 WBCS
PLATELET # BLD AUTO: 119 THOUSANDS/UL (ref 149–390)
PMV BLD AUTO: 10.8 FL (ref 8.9–12.7)
POTASSIUM SERPL-SCNC: 4 MMOL/L (ref 3.5–5.3)
RBC # BLD AUTO: 4.11 MILLION/UL (ref 3.81–5.12)
SODIUM SERPL-SCNC: 140 MMOL/L (ref 135–147)
TRIGL SERPL-MCNC: 78 MG/DL
WBC # BLD AUTO: 5.62 THOUSAND/UL (ref 4.31–10.16)

## 2024-10-09 PROCEDURE — 80048 BASIC METABOLIC PNL TOTAL CA: CPT

## 2024-10-09 PROCEDURE — 36415 COLL VENOUS BLD VENIPUNCTURE: CPT

## 2024-10-09 PROCEDURE — 85025 COMPLETE CBC W/AUTO DIFF WBC: CPT

## 2024-10-09 PROCEDURE — 80061 LIPID PANEL: CPT

## 2024-10-15 ENCOUNTER — OFFICE VISIT (OUTPATIENT)
Dept: INTERNAL MEDICINE CLINIC | Age: 74
End: 2024-10-15
Payer: MEDICARE

## 2024-10-15 VITALS
HEIGHT: 61 IN | DIASTOLIC BLOOD PRESSURE: 74 MMHG | WEIGHT: 198 LBS | SYSTOLIC BLOOD PRESSURE: 140 MMHG | HEART RATE: 56 BPM | OXYGEN SATURATION: 97 % | BODY MASS INDEX: 37.38 KG/M2 | TEMPERATURE: 97.3 F

## 2024-10-15 DIAGNOSIS — G89.29 CHRONIC PAIN OF BOTH KNEES: ICD-10-CM

## 2024-10-15 DIAGNOSIS — R73.01 IMPAIRED FASTING GLUCOSE: ICD-10-CM

## 2024-10-15 DIAGNOSIS — D69.6 THROMBOCYTOPENIA (HCC): ICD-10-CM

## 2024-10-15 DIAGNOSIS — M25.561 CHRONIC PAIN OF BOTH KNEES: ICD-10-CM

## 2024-10-15 DIAGNOSIS — I10 BENIGN ESSENTIAL HYPERTENSION: Primary | ICD-10-CM

## 2024-10-15 DIAGNOSIS — M25.562 CHRONIC PAIN OF BOTH KNEES: ICD-10-CM

## 2024-10-15 DIAGNOSIS — M19.90 ARTHRITIS: ICD-10-CM

## 2024-10-15 DIAGNOSIS — E78.00 HYPERCHOLESTEROLEMIA: ICD-10-CM

## 2024-10-15 DIAGNOSIS — Z86.19 HX OF COLD SORES: ICD-10-CM

## 2024-10-15 DIAGNOSIS — F41.9 ANXIETY: ICD-10-CM

## 2024-10-15 DIAGNOSIS — G47.00 INSOMNIA, UNSPECIFIED TYPE: ICD-10-CM

## 2024-10-15 PROCEDURE — 99214 OFFICE O/P EST MOD 30 MIN: CPT | Performed by: INTERNAL MEDICINE

## 2024-10-15 PROCEDURE — G2211 COMPLEX E/M VISIT ADD ON: HCPCS | Performed by: INTERNAL MEDICINE

## 2024-10-15 RX ORDER — TRAMADOL HYDROCHLORIDE 50 MG/1
50 TABLET ORAL EVERY 8 HOURS PRN
Qty: 90 TABLET | Refills: 5 | Status: SHIPPED | OUTPATIENT
Start: 2024-10-15 | End: 2025-10-15

## 2024-10-15 RX ORDER — ACYCLOVIR 50 MG/G
OINTMENT TOPICAL 3 TIMES DAILY
Qty: 15 G | Refills: 2 | Status: SHIPPED | OUTPATIENT
Start: 2024-10-15

## 2024-10-15 RX ORDER — LORAZEPAM 1 MG/1
1 TABLET ORAL DAILY PRN
Qty: 30 TABLET | Refills: 5 | Status: SHIPPED | OUTPATIENT
Start: 2024-10-15

## 2024-10-15 RX ORDER — ZOLPIDEM TARTRATE 5 MG/1
5 TABLET ORAL
Qty: 30 TABLET | Refills: 5 | Status: SHIPPED | OUTPATIENT
Start: 2024-10-15

## 2024-10-15 NOTE — ASSESSMENT & PLAN NOTE
Insomnia is a most problem she takes Ambien 5 mg is still she sleeps about 2 to 3 hours when she wake up she cannot fall asleep she initiate the sleep with Ambien but she cannot maintain  Orders:    zolpidem (AMBIEN) 5 mg tablet; Take 1 tablet (5 mg total) by mouth daily at bedtime as needed (prn)

## 2024-10-15 NOTE — ASSESSMENT & PLAN NOTE
Orders:    traMADol (ULTRAM) 50 mg tablet; Take 1 tablet (50 mg total) by mouth every 8 (eight) hours as needed (prn)

## 2024-10-15 NOTE — PROGRESS NOTES
Ambulatory Visit  Name: Mariam Nevarez      : 1950      MRN: [id number]  Encounter Provider: Devora Schaeffer MD  Encounter Date: 10/15/2024   Encounter department: [facility]    Assessment & Plan  Anxiety    Orders:    LORazepam (ATIVAN) 1 mg tablet; Take 1 tablet (1 mg total) by mouth daily as needed for anxiety    Thrombocytopenia (HCC)    Orders:    traMADol (ULTRAM) 50 mg tablet; Take 1 tablet (50 mg total) by mouth every 8 (eight) hours as needed (prn)    Arthritis    Orders:    traMADol (ULTRAM) 50 mg tablet; Take 1 tablet (50 mg total) by mouth every 8 (eight) hours as needed (prn)    Chronic pain of both knees  Continue with the tramadol as needed  Orders:    traMADol (ULTRAM) 50 mg tablet; Take 1 tablet (50 mg total) by mouth every 8 (eight) hours as needed (prn)    Insomnia, unspecified type  Insomnia is a most problem she takes Ambien 5 mg is still she sleeps about 2 to 3 hours when she wake up she cannot fall asleep she initiate the sleep with Ambien but she cannot maintain  Orders:    zolpidem (AMBIEN) 5 mg tablet; Take 1 tablet (5 mg total) by mouth daily at bedtime as needed (prn)    Benign essential hypertension  Hypertension could be better controlled systolic blood pressure is 140       Impaired fasting glucose  Fasting blood sugars was normal       Hypercholesterolemia  Lipid panel was excellent continue with the present management       Hx of cold sores    Orders:    acyclovir (ZOVIRAX) 5 % ointment; Apply topically 3 (three) times a day       History of Present Illness     No known 74 years young lady is here today for the regular follow-up she is doing well no new complaints except her usual problem of insomnia and joint pains back in in the knee she is trying to lose weight but unable to she is working on it she cannot do much exercises because of her joint problems    Hypertension could be better controlled systolic is slightly high at 140 we will  continue to follow no new complaints regarding hypertension no side effects of the medications.    Hypercholesterolemia reviewed the lipid panel patient is on statin therapy.  Side effects of statin medications.  Patient is taking his medications  regularly.  Comorbidities include hypertension, diabetes, coronary artery disease.  plan is to continue with the present management continue to follow-up with the lipid panel    Current symptoms: none. denies current suicidal and homicidal ideation. He complains of the following side effects from the treatment: none.      Affect/Behavior:  full facial expressions, good grooming, good insight, normal perception, normal reasoning, normal speech pattern and content, and normal thought patterns none    Assessment/Plan  Anxiety Disorder - improving    Instructed patient to contact office or on-call physician promptly should condition worsen or any new symptoms appear and provided on-call telephone numbers. IF THE PATIENT HAS ANY SUICIDAL OR HOMICIDAL IDEATIONS, CALL THE OFFICE, DISCUSS WITH A SUPPORT MEMBER, OR GO TO THE ER IMMEDIATELY. Patient was agreeable with this plan..              History obtained from : patient  Review of Systems  Medical History Reviewed by provider this encounter:       Current Outpatient Medications on File Prior to Visit   Medication Sig Dispense Refill    amoxicillin (AMOXIL) 500 mg capsule Take 4 tablets prior to dental work 12 capsule 1    escitalopram (LEXAPRO) 10 mg tablet Take 1 tablet (10 mg total) by mouth daily 90 tablet 1    ezetimibe (ZETIA) 10 mg tablet Take 1 tablet (10 mg total) by mouth daily 90 tablet 1    ketoconazole (NIZORAL) 2 % cream APPLY TO AFFECTED AREAS OF SKIN UP TO TWICE DAILY, AS NEEDED WITH FLARE UPS      mometasone (ELOCON) 0.1 % cream APPLY TO AFFECTED AREAS TWICE DAILY FOR TWO WEEKS ON, ONE WEEK OFF THEN AS NEEDED WITH FLARES      nadolol (CORGARD) 80 MG tablet Take 1 tablet (80 mg total) by mouth daily 90 tablet 1     "naloxone (NARCAN) 4 mg/0.1 mL nasal spray Administer 1 spray into a nostril. If no response after 2-3 minutes, give another dose in the other nostril using a new spray. 1 each 1    nystatin (MYCOSTATIN) powder APPLY TO AFFECTED AREA(S) TWICE A DAY 60 g 0    simvastatin (ZOCOR) 40 mg tablet Take 1 tablet (40 mg total) by mouth daily 90 tablet 1    [DISCONTINUED] LORazepam (ATIVAN) 1 mg tablet Take 1 tablet (1 mg total) by mouth daily as needed for anxiety 30 tablet 5    [DISCONTINUED] traMADol (ULTRAM) 50 mg tablet Take 1 tablet (50 mg total) by mouth every 8 (eight) hours as needed (prn) 90 tablet 5    [DISCONTINUED] zolpidem (AMBIEN) 5 mg tablet Take 1 tablet (5 mg total) by mouth daily at bedtime as needed (prn) 30 tablet 0    naloxone (NARCAN) 4 mg/0.1 mL nasal spray Administer 1 spray into a nostril. If no response after 2-3 minutes, give another dose in the other nostril using a new spray. 1 each 1     No current facility-administered medications on file prior to visit.          Objective     /74 (BP Location: Left arm, Patient Position: Sitting, Cuff Size: Large)   Pulse 56   Temp (!) 97.3 °F (36.3 °C) (Temporal)   Ht 5' 1\" (1.549 m)   Wt 89.8 kg (198 lb)   SpO2 97% Comment: room air  BMI 37.41 kg/m²     Physical Exam  Constitutional:       Appearance: She is obese.   HENT:      Head: Normocephalic.   Eyes:      Pupils: Pupils are equal, round, and reactive to light.   Cardiovascular:      Rate and Rhythm: Normal rate and regular rhythm.      Heart sounds: No murmur heard.  Pulmonary:      Effort: Pulmonary effort is normal.      Breath sounds: Normal breath sounds.   Abdominal:      General: Bowel sounds are normal.      Palpations: Abdomen is soft.   Musculoskeletal:      Cervical back: Normal range of motion.   Skin:     General: Skin is warm.   Neurological:      General: No focal deficit present.      Mental Status: She is alert and oriented to person, place, and time.   Psychiatric:         " Behavior: Behavior normal.         Thought Content: Thought content normal.

## 2025-01-22 ENCOUNTER — RA CDI HCC (OUTPATIENT)
Dept: OTHER | Facility: HOSPITAL | Age: 75
End: 2025-01-22

## 2025-01-29 ENCOUNTER — OFFICE VISIT (OUTPATIENT)
Dept: INTERNAL MEDICINE CLINIC | Age: 75
End: 2025-01-29
Payer: MEDICARE

## 2025-01-29 VITALS
SYSTOLIC BLOOD PRESSURE: 128 MMHG | TEMPERATURE: 96.6 F | HEIGHT: 61 IN | WEIGHT: 201 LBS | OXYGEN SATURATION: 94 % | DIASTOLIC BLOOD PRESSURE: 82 MMHG | HEART RATE: 60 BPM | BODY MASS INDEX: 37.95 KG/M2

## 2025-01-29 DIAGNOSIS — D69.6 THROMBOCYTOPENIA (HCC): ICD-10-CM

## 2025-01-29 DIAGNOSIS — F11.20 CONTINUOUS OPIOID DEPENDENCE (HCC): ICD-10-CM

## 2025-01-29 DIAGNOSIS — E66.01 OBESITY, MORBID (HCC): ICD-10-CM

## 2025-01-29 DIAGNOSIS — F51.01 PRIMARY INSOMNIA: ICD-10-CM

## 2025-01-29 DIAGNOSIS — F11.20 OPIOID DEPENDENCE, UNCOMPLICATED (HCC): ICD-10-CM

## 2025-01-29 DIAGNOSIS — I10 HYPERTENSION, UNSPECIFIED TYPE: ICD-10-CM

## 2025-01-29 DIAGNOSIS — I10 BENIGN ESSENTIAL HYPERTENSION: Primary | ICD-10-CM

## 2025-01-29 PROCEDURE — G2211 COMPLEX E/M VISIT ADD ON: HCPCS | Performed by: INTERNAL MEDICINE

## 2025-01-29 PROCEDURE — 99214 OFFICE O/P EST MOD 30 MIN: CPT | Performed by: INTERNAL MEDICINE

## 2025-01-29 RX ORDER — NADOLOL 80 MG/1
80 TABLET ORAL DAILY
Qty: 90 TABLET | Refills: 1 | Status: SHIPPED | OUTPATIENT
Start: 2025-01-29

## 2025-01-29 NOTE — PROGRESS NOTES
Name: Mariam Nevarez      : 1950      MRN: [id number]  Encounter Provider: Devora Schaeffer MD  Encounter Date: 2025   Encounter department: [facility]  :  Assessment & Plan  Hypertension, unspecified type    Orders:    nadolol (CORGARD) 80 MG tablet; Take 1 tablet (80 mg total) by mouth daily    Continuous opioid dependence (HCC)  Opiate use for arthritis       Thrombocytopenia (HCC)  Mild thrombocytopenia no bleeding problems       Obesity, morbid (HCC)    Discussed about the GLP-1 patient will think about it       Opioid dependence, uncomplicated (HCC)  Send is on tramadol for her joint pains and she does not abuse the medication       Benign essential hypertension  Hypertension is very well-controlled       Primary insomnia  Biggest problem is primary insomnia she wake up 3 to 4:00 at night and she does not able to sleep after that she said that she gets maybe 3 hours of good sleep and then she become sleepy in the daytime I recommended her to see the sleep specialist but she is not very anxious about it              History of Present Illness   This is a very pleasant 74 years young lady who is here today for the regular problem according to her her biggest problem is a insomnia and her arthritis mostly in the hands and also in the knee when she goes up and down the stairs she is living with that sometimes she takes the medication tramadol as needed for the pain which helps her    Patient is here today for the follow-up.   Hypertension. I reviewed antihypertensive medication, patient does not have any side effects of  medications, no signs or symptoms of hypertension ,hypotension or orthostatic hypotension.  Patient is compliant with medications.  Blood workup related to hypertensive diagnosis reviewed.  Plan is to continue with the present management.  We will follow-up as a scheduled and adjust the doses of the medication as indicated.    Obesity she is trying  "to lose weight but unable to lose weight because of her arthritic problem and the lack of exercise she started walking now I discussed with her regarding the GLP-1 inhibitor to overall improve her general health with the weight loss she is not very anxious about it but she will think about it    Primary insomnia cannot sleep more than 3 to 4 hours taking medication for insomnia which are not helping her a problem with anxiety and I recommend her to see and consult the sleep doctor but she does not want to do that right now but we will discuss with her again      Review of Systems   Constitutional:  Positive for fatigue. Negative for chills.   HENT:  Negative for congestion, ear pain, hearing loss, postnasal drip, sinus pressure, sore throat and voice change.    Eyes:  Negative for pain, discharge and visual disturbance.   Respiratory:  Negative for cough, chest tightness and shortness of breath.    Cardiovascular:  Negative for chest pain, palpitations and leg swelling.   Gastrointestinal:  Negative for abdominal pain, blood in stool, diarrhea, nausea and rectal pain.   Genitourinary:  Positive for urgency. Negative for difficulty urinating and dysuria.   Musculoskeletal:  Negative for arthralgias and joint swelling.   Skin:  Negative for rash.   Allergic/Immunologic: Negative for environmental allergies and food allergies.   Neurological:  Negative for dizziness, tremors, weakness, numbness and headaches.   Hematological:  Negative for adenopathy.   Psychiatric/Behavioral:  Positive for sleep disturbance (Insomnia). Negative for behavioral problems and hallucinations.        Objective   /82 (BP Location: Left arm, Patient Position: Sitting, Cuff Size: Standard)   Pulse 60   Temp (!) 96.6 °F (35.9 °C) (Temporal)   Ht 5' 1\" (1.549 m)   Wt 91.2 kg (201 lb)   SpO2 94%   BMI 37.98 kg/m²      Physical Exam  Constitutional:       Appearance: She is well-developed. She is obese.   HENT:      Right Ear: Tympanic " membrane and external ear normal.      Left Ear: Tympanic membrane normal.   Eyes:      Conjunctiva/sclera: Conjunctivae normal.      Pupils: Pupils are equal, round, and reactive to light.   Neck:      Thyroid: No thyromegaly.      Vascular: No JVD.   Cardiovascular:      Rate and Rhythm: Normal rate and regular rhythm.      Heart sounds: Normal heart sounds.   Pulmonary:      Breath sounds: Normal breath sounds.   Abdominal:      General: Bowel sounds are normal.      Palpations: Abdomen is soft.   Musculoskeletal:         General: Normal range of motion.      Cervical back: Normal range of motion.   Lymphadenopathy:      Cervical: No cervical adenopathy.   Skin:     General: Skin is dry.   Neurological:      General: No focal deficit present.      Mental Status: She is alert and oriented to person, place, and time. Mental status is at baseline.      Deep Tendon Reflexes: Reflexes are normal and symmetric.   Psychiatric:         Mood and Affect: Mood normal.         Behavior: Behavior normal.

## 2025-01-29 NOTE — ASSESSMENT & PLAN NOTE
Discussed about the GLP-1 patient will think about it       
Biggest problem is primary insomnia she wake up 3 to 4:00 at night and she does not able to sleep after that she said that she gets maybe 3 hours of good sleep and then she become sleepy in the daytime I recommended her to see the sleep specialist but she is not very anxious about it       
Hypertension is very well-controlled       
Mild thrombocytopenia no bleeding problems       
Send is on tramadol for her joint pains and she does not abuse the medication       
No

## 2025-04-09 DIAGNOSIS — G47.00 INSOMNIA, UNSPECIFIED TYPE: ICD-10-CM

## 2025-04-09 RX ORDER — ZOLPIDEM TARTRATE 5 MG/1
5 TABLET ORAL
Qty: 30 TABLET | Refills: 5 | Status: SHIPPED | OUTPATIENT
Start: 2025-04-09

## 2025-04-09 NOTE — TELEPHONE ENCOUNTER
Medication: zolpidem (AMBIEN) 5 mg tablet     Dose/Frequency:     Take 1 tablet (5 mg total) by mouth daily at bedtime as needed (prn)       Quantity: 30    Pharmacy: [id number] - [place], [place] - [address]     Office:   [x] PCP/Provider - Dr Schaeffer  [] Speciality/Provider -     Does the patient have enough for 3 days?   [x] Yes   [] No - Send as HP to POD    No refills at pharmacy she said.

## 2025-04-29 ENCOUNTER — TELEPHONE (OUTPATIENT)
Age: 75
End: 2025-04-29

## 2025-04-29 DIAGNOSIS — M25.561 CHRONIC PAIN OF BOTH KNEES: ICD-10-CM

## 2025-04-29 DIAGNOSIS — F41.9 ANXIETY: ICD-10-CM

## 2025-04-29 DIAGNOSIS — M25.562 CHRONIC PAIN OF BOTH KNEES: ICD-10-CM

## 2025-04-29 DIAGNOSIS — M19.90 ARTHRITIS: ICD-10-CM

## 2025-04-29 DIAGNOSIS — G89.29 CHRONIC PAIN OF BOTH KNEES: ICD-10-CM

## 2025-04-29 DIAGNOSIS — D69.6 THROMBOCYTOPENIA (HCC): ICD-10-CM

## 2025-04-29 RX ORDER — LORAZEPAM 1 MG/1
1 TABLET ORAL DAILY PRN
Qty: 30 TABLET | Refills: 5 | Status: SHIPPED | OUTPATIENT
Start: 2025-04-29

## 2025-04-29 RX ORDER — TRAMADOL HYDROCHLORIDE 50 MG/1
50 TABLET ORAL EVERY 8 HOURS PRN
Qty: 90 TABLET | Refills: 5 | Status: SHIPPED | OUTPATIENT
Start: 2025-04-29 | End: 2026-04-29

## 2025-04-29 NOTE — TELEPHONE ENCOUNTER
Rosalind stated another family member has been diagnosed with breast cancer and she'd like a script for a mammogram.    I tried explaining to her that she currently has one and is scheduled for 6/16 but she is still requesting Dr. Schaeffer order one for her.    Please review current script in chart and contact pt to advise if needed.

## 2025-04-29 NOTE — TELEPHONE ENCOUNTER
Medication: LORazepam (ATIVAN     Dose/Frequency: 1 mg    Quantity: 30    Pharmacy: [facility] - [place], [place] [address]  Phone: [phone] Fax: [phone]      Office:   [x] PCP/Provider -   [] Speciality/Provider -     Does the patient have enough for 3 days?   [] Yes   [x] No - Send as HP to POD    ___________________________________    Medication: traMADol (ULTRAM)     Dose/Frequency: 50 mg    Quantity: 90    Pharmacy: [facility] - [place], [address] [address]  Phone: [phone] Fax: [phone]    Office:   [x] PCP/Provider -   [] Speciality/Provider -     Does the patient have enough for 3 days?   [] Yes   [x] No - Send as HP to POD

## 2025-06-09 ENCOUNTER — RA CDI HCC (OUTPATIENT)
Dept: OTHER | Facility: HOSPITAL | Age: 75
End: 2025-06-09

## 2025-06-16 ENCOUNTER — HOSPITAL ENCOUNTER (OUTPATIENT)
Dept: RADIOLOGY | Age: 75
Discharge: HOME | End: 2025-06-16
Payer: MEDICARE

## 2025-06-16 ENCOUNTER — OFFICE VISIT (OUTPATIENT)
Dept: INTERNAL MEDICINE CLINIC | Age: 75
End: 2025-06-16
Payer: MEDICARE

## 2025-06-16 VITALS
OXYGEN SATURATION: 96 % | DIASTOLIC BLOOD PRESSURE: 74 MMHG | TEMPERATURE: 97.2 F | WEIGHT: 199.4 LBS | BODY MASS INDEX: 37.65 KG/M2 | HEIGHT: 61 IN | HEART RATE: 62 BPM | SYSTOLIC BLOOD PRESSURE: 126 MMHG

## 2025-06-16 VITALS — HEIGHT: 61 IN | WEIGHT: 194 LBS | BODY MASS INDEX: 36.63 KG/M2

## 2025-06-16 DIAGNOSIS — F51.01 PRIMARY INSOMNIA: ICD-10-CM

## 2025-06-16 DIAGNOSIS — I10 BENIGN ESSENTIAL HYPERTENSION: Primary | ICD-10-CM

## 2025-06-16 DIAGNOSIS — E55.9 VITAMIN D DEFICIENCY: ICD-10-CM

## 2025-06-16 DIAGNOSIS — I10 HYPERTENSION, UNSPECIFIED TYPE: ICD-10-CM

## 2025-06-16 DIAGNOSIS — Z12.31 VISIT FOR SCREENING MAMMOGRAM: ICD-10-CM

## 2025-06-16 DIAGNOSIS — E66.01 OBESITY, MORBID (HCC): ICD-10-CM

## 2025-06-16 DIAGNOSIS — E78.00 HYPERCHOLESTEROLEMIA: ICD-10-CM

## 2025-06-16 PROCEDURE — G0439 PPPS, SUBSEQ VISIT: HCPCS | Performed by: INTERNAL MEDICINE

## 2025-06-16 PROCEDURE — 77063 BREAST TOMOSYNTHESIS BI: CPT

## 2025-06-16 PROCEDURE — 99214 OFFICE O/P EST MOD 30 MIN: CPT | Performed by: INTERNAL MEDICINE

## 2025-06-16 PROCEDURE — 77067 SCR MAMMO BI INCL CAD: CPT

## 2025-06-16 RX ORDER — GABAPENTIN 100 MG/1
100 CAPSULE ORAL
Qty: 30 CAPSULE | Refills: 3 | Status: SHIPPED | OUTPATIENT
Start: 2025-06-16

## 2025-06-16 RX ORDER — NADOLOL 80 MG/1
80 TABLET ORAL DAILY
Qty: 90 TABLET | Refills: 1 | Status: SHIPPED | OUTPATIENT
Start: 2025-06-16

## 2025-06-16 NOTE — ASSESSMENT & PLAN NOTE
Significant problem with the insomnia patient is on lorazepam 1 mg and Ambien 5 mg still she is not able to sleep well.  She goes to sleep almost 11:00 at night but she cannot sleep until 5 AM when it is ready to wake up she will fall asleep she can get the full sleep and tired and on fresh in the morning I offered her sleep study but she is not ready for it because her  is not doing well  Orders:    gabapentin (NEURONTIN) 100 mg capsule; Take 1 capsule (100 mg total) by mouth daily at bedtime

## 2025-06-16 NOTE — ASSESSMENT & PLAN NOTE
We will follow-up with the lipid panel  Orders:    Lipid panel; Future    TSH, 3rd generation; Future

## 2025-06-16 NOTE — PROGRESS NOTES
Name: Mariam Nevarez      : 1950      MRN: [id number]  Encounter Provider: Devora Schaeffer MD  Encounter Date: 2025   Encounter department: [facility]  :  Assessment & Plan  Hypertension, unspecified type  Hypertension is very well-controlled  Orders:    nadolol (CORGARD) 80 MG tablet; Take 1 tablet (80 mg total) by mouth daily    CBC and differential; Future    Comprehensive metabolic panel; Future    Lipid panel; Future    UA w Reflex to Microscopic w Reflex to Culture; Future    Vitamin D 25 hydroxy; Future    TSH, 3rd generation; Future    Benign essential hypertension         Hypercholesterolemia  We will follow-up with the lipid panel  Orders:    Lipid panel; Future    TSH, 3rd generation; Future    Obesity, morbid (HCC)    Able to lose much weight but this time she lost about 2 pounds       Primary insomnia  Significant problem with the insomnia patient is on lorazepam 1 mg and Ambien 5 mg still she is not able to sleep well.  She goes to sleep almost 11:00 at night but she cannot sleep until 5 AM when it is ready to wake up she will fall asleep she can get the full sleep and tired and on fresh in the morning I offered her sleep study but she is not ready for it because her  is not doing well  Orders:    gabapentin (NEURONTIN) 100 mg capsule; Take 1 capsule (100 mg total) by mouth daily at bedtime    Vitamin D deficiency    Orders:    Vitamin D 25 hydroxy; Future       Preventive health issues were discussed with patient, and age appropriate screening tests were ordered as noted in patient's After Visit Summary. Personalized health advice and appropriate referrals for health education or preventive services given if needed, as noted in patient's After Visit Summary.    History of Present Illness     This is a very pleasant 75 years young lady who is here today for the regular follow-up and Medicare wellness visit her Medicare wellness is up to date  including her immunization    Patient is here today for the follow-up.   Hypertension. I reviewed antihypertensive medication, patient does not have any side effects of  medications, no signs or symptoms of hypertension ,hypotension or orthostatic hypotension.  Patient is compliant with medications.  Blood workup related to hypertensive diagnosis reviewed.  Plan is to continue with the present management.  We will follow-up as a scheduled and adjust the doses of the medication as indicated.    Insomnia this is the big problem I described it in the review of system and also in the assessment and plan we will try Neurontin 100 mg at bedtime to help her with sleep continue with the Ambien as before and she can take lorazepam as needed in the evening for anxiety    Obesity she is not able to lose much weight discussed with her before she is trying but no significant weight loss so far       Patient Care Team:  Devora Schaeffer MD as PCP - General    Review of Systems   Constitutional:  Positive for fatigue. Negative for chills.   HENT:  Negative for congestion, ear pain, hearing loss, postnasal drip, sinus pressure, sore throat and voice change.    Eyes:  Negative for pain, discharge and visual disturbance.   Respiratory:  Negative for cough, chest tightness and shortness of breath.    Cardiovascular:  Negative for chest pain, palpitations and leg swelling.   Gastrointestinal:  Negative for abdominal pain, blood in stool, diarrhea, nausea and rectal pain.   Genitourinary:  Negative for difficulty urinating, dysuria and urgency.   Musculoskeletal:  Negative for arthralgias and joint swelling.   Skin:  Negative for rash.   Allergic/Immunologic: Negative for environmental allergies and food allergies.   Neurological:  Negative for dizziness, tremors, weakness, numbness and headaches.   Hematological:  Negative for adenopathy.   Psychiatric/Behavioral:  Positive for sleep disturbance (Problem with the sleep started after she  lost her son and since then she is not able to do good with the sleep as given above). Negative for behavioral problems and hallucinations. The patient is nervous/anxious.      Medical History Reviewed by provider this encounter:       Annual Wellness Visit Questionnaire   Mariam is here for her Subsequent Wellness visit. Last Medicare Wellness visit information reviewed, patient interviewed and updates made to the record today.      Health Risk Assessment:   Patient rates overall health as good. Patient feels that their physical health rating is same. Patient is satisfied with their life. Eyesight was rated as same. Hearing was rated as slightly worse. Patient feels that their emotional and mental health rating is much better. Patients states they are never, rarely angry. Patient states they are sometimes unusually tired/fatigued. Pain experienced in the last 7 days has been some. Patient's pain rating has been 3/10. Patient states that she has experienced no weight loss or gain in last 6 months.     Depression Screening:   PHQ-9 Score: 4      Fall Risk Screening:   In the past year, patient has experienced: no history of falling in past year      Urinary Incontinence Screening:   Patient has leaked urine accidently in the last six months.     Home Safety:  Patient does not have trouble with stairs inside or outside of their home. Patient has working smoke alarms and has working carbon monoxide detector. Home safety hazards include: none.     Nutrition:   Current diet is Regular.     Medications:   Patient is not currently taking any over-the-counter supplements. Patient is able to manage medications.     Activities of Daily Living (ADLs)/Instrumental Activities of Daily Living (IADLs):   Walk and transfer into and out of bed and chair?: Yes  Dress and groom yourself?: Yes    Bathe or shower yourself?: Yes    Feed yourself? Yes  Do your laundry/housekeeping?: Yes  Manage your money, pay your bills and track your  expenses?: Yes  Make your own meals?: Yes    Do your own shopping?: Yes    Previous Hospitalizations:   Any hospitalizations or ED visits within the last 12 months?: No      Advance Care Planning:   Living will: Yes    Durable POA for healthcare: Yes    Advanced directive: Yes    Advanced directive counseling given: Yes      Cognitive Screening:   Provider or family/friend/caregiver concerned regarding cognition?: No    Preventive Screenings      Cardiovascular Screening:    General: Screening Not Indicated and History Lipid Disorder      Diabetes Screening:     General: Screening Current      Colorectal Cancer Screening:     General: Screening Current      Breast Cancer Screening:     General: Screening Current      Cervical Cancer Screening:    General: Screening Not Indicated and Risks and Benefits Discussed      Abdominal Aortic Aneurysm (AAA) Screening:        General: Screening Current      Lung Cancer Screening:     General: Screening Not Indicated and Risks and Benefits Discussed      Hepatitis C Screening:    General: Screening Current    Immunizations:  - Immunizations due: Influenza, Tdap and Zoster (Shingrix)    Screening, Brief Intervention, and Referral to Treatment (SBIRT)     Screening  Typical number of drinks in a day: 0  Typical number of drinks in a week: 0  Interpretation: Low risk drinking behavior.    AUDIT-C Screenin) How often did you have a drink containing alcohol in the past year? monthly or less  2) How many drinks did you have on a typical day when you were drinking in the past year? 0  3) How often did you have 6 or more drinks on one occasion in the past year? never    AUDIT-C Score: 1  Interpretation: Score 0-2 (female): Negative screen for alcohol misuse    Single Item Drug Screening:  How often have you used an illegal drug (including marijuana) or a prescription medication for non-medical reasons in the past year? never    Single Item Drug Screen Score: 0  Interpretation:  Negative screen for possible drug use disorder    Review of Current Opioid Use    Opioid Risk Tool (ORT) Interpretation: Complete Opioid Risk Tool (ORT)    Other Counseling Topics:   Car/seat belt/driving safety and calcium and vitamin D intake and regular weightbearing exercise.     Social Drivers of Health     Food Insecurity: No Food Insecurity (6/14/2025)    Nursing - Inadequate Food Risk Classification     Worried About Running Out of Food in the Last Year: Never true     Ran Out of Food in the Last Year: Never true   Transportation Needs: No Transportation Needs (6/14/2025)    PRAPARE - Transportation     Lack of Transportation (Medical): No     Lack of Transportation (Non-Medical): No   Housing Stability: Low Risk  (6/14/2025)    Housing Stability Vital Sign     Unable to Pay for Housing in the Last Year: No     Number of Times Moved in the Last Year: 0     Homeless in the Last Year: No   Utilities: Not At Risk (6/14/2025)    [facility] Utilities     Threatened with loss of utilities: No     No results found.    Objective   There were no vitals taken for this visit.    Physical Exam  Constitutional:       Appearance: She is well-developed. She is obese.   HENT:      Right Ear: Tympanic membrane and external ear normal.      Left Ear: Tympanic membrane normal.     Eyes:      Conjunctiva/sclera: Conjunctivae normal.      Pupils: Pupils are equal, round, and reactive to light.     Neck:      Thyroid: No thyromegaly.      Vascular: No JVD.     Cardiovascular:      Rate and Rhythm: Normal rate and regular rhythm.      Heart sounds: Normal heart sounds.   Pulmonary:      Breath sounds: Normal breath sounds.   Abdominal:      General: Bowel sounds are normal.      Palpations: Abdomen is soft.     Musculoskeletal:         General: Normal range of motion.      Cervical back: Normal range of motion.   Lymphadenopathy:      Cervical: No cervical adenopathy.     Skin:     General: Skin is dry.     Neurological:      General: No  focal deficit present.      Mental Status: She is alert and oriented to person, place, and time. Mental status is at baseline.      Deep Tendon Reflexes: Reflexes are normal and symmetric.     Psychiatric:         Mood and Affect: Mood normal.         Behavior: Behavior normal.

## 2025-06-16 NOTE — PATIENT INSTRUCTIONS
Medicare Preventive Visit Patient Instructions  Thank you for completing your Welcome to Medicare Visit or Medicare Annual Wellness Visit today. Your next wellness visit will be due in one year (6/17/2026).  The screening/preventive services that you may require over the next 5-10 years are detailed below. Some tests may not apply to you based off risk factors and/or age. Screening tests ordered at today's visit but not completed yet may show as past due. Also, please note that scanned in results may not display below.  Preventive Screenings:  Service Recommendations Previous Testing/Comments   Colorectal Cancer Screening  * Colonoscopy    * Fecal Occult Blood Test (FOBT)/Fecal Immunochemical Test (FIT)  * Fecal DNA/Cologuard Test  * Flexible Sigmoidoscopy Age: 45-75 years old   Colonoscopy: every 10 years (may be performed more frequently if at higher risk)  OR  FOBT/FIT: every 1 year  OR  Cologuard: every 3 years  OR  Sigmoidoscopy: every 5 years  Screening may be recommended earlier than age 45 if at higher risk for colorectal cancer. Also, an individualized decision between you and your healthcare provider will decide whether screening between the ages of 76-85 would be appropriate. Colonoscopy: 08/05/2021  FOBT/FIT: Not on file  Cologuard: Not on file  Sigmoidoscopy: Not on file          Breast Cancer Screening Age: 40+ years old  Frequency: every 1-2 years  Not required if history of left and right mastectomy Mammogram: 06/16/2025    Screening Current   Cervical Cancer Screening Between the ages of 21-29, pap smear recommended once every 3 years.   Between the ages of 30-65, can perform pap smear with HPV co-testing every 5 years.   Recommendations may differ for women with a history of total hysterectomy, cervical cancer, or abnormal pap smears in past. Pap Smear: 01/18/2019    Screening Not Indicated   Hepatitis C Screening Once for adults born between 1945 and 1965  More frequently in patients at high risk  for Hepatitis C Hep C Antibody: 12/18/2019    Screening Current   Diabetes Screening 1-2 times per year if you're at risk for diabetes or have pre-diabetes Fasting glucose: 91 mg/dL (10/9/2024)  A1C: 5.4 % (9/8/2022)  Screening Current   Cholesterol Screening Once every 5 years if you don't have a lipid disorder. May order more often based on risk factors. Lipid panel: 10/09/2024    Screening Not Indicated  History Lipid Disorder     Other Preventive Screenings Covered by Medicare:  Abdominal Aortic Aneurysm (AAA) Screening: covered once if your at risk. You're considered to be at risk if you have a family history of AAA.  Lung Cancer Screening: covers low dose CT scan once per year if you meet all of the following conditions: (1) Age 55-77; (2) No signs or symptoms of lung cancer; (3) Current smoker or have quit smoking within the last 15 years; (4) You have a tobacco smoking history of at least 20 pack years (packs per day multiplied by number of years you smoked); (5) You get a written order from a healthcare provider.  Glaucoma Screening: covered annually if you're considered high risk: (1) You have diabetes OR (2) Family history of glaucoma OR (3)  aged 50 and older OR (4)  American aged 65 and older  Osteoporosis Screening: covered every 2 years if you meet one of the following conditions: (1) You're estrogen deficient and at risk for osteoporosis based off medical history and other findings; (2) Have a vertebral abnormality; (3) On glucocorticoid therapy for more than 3 months; (4) Have primary hyperparathyroidism; (5) On osteoporosis medications and need to assess response to drug therapy.   Last bone density test (DXA Scan): 11/04/2010.  HIV Screening: covered annually if you're between the age of 15-65. Also covered annually if you are younger than 15 and older than 65 with risk factors for HIV infection. For pregnant patients, it is covered up to 3 times per  pregnancy.    Immunizations:  Immunization Recommendations   Influenza Vaccine Annual influenza vaccination during flu season is recommended for all persons aged >= 6 months who do not have contraindications   Pneumococcal Vaccine   * Pneumococcal conjugate vaccine = PCV13 (Prevnar 13), PCV15 (Vaxneuvance), PCV20 (Prevnar 20)  * Pneumococcal polysaccharide vaccine = PPSV23 (Pneumovax) Adults 19-63 yo with certain risk factors or if 65+ yo  If never received any pneumonia vaccine: recommend Prevnar 20 (PCV20)  Give PCV20 if previously received 1 dose of PCV13 or PPSV23   Hepatitis B Vaccine 3 dose series if at intermediate or high risk (ex: diabetes, end stage renal disease, liver disease)   Respiratory syncytial virus (RSV) Vaccine - COVERED BY MEDICARE PART D  * RSVPreF3 (Arexvy) CDC recommends that adults 60 years of age and older may receive a single dose of RSV vaccine using shared clinical decision-making (SCDM)   Tetanus (Td) Vaccine - COST NOT COVERED BY MEDICARE PART B Following completion of primary series, a booster dose should be given every 10 years to maintain immunity against tetanus. Td may also be given as tetanus wound prophylaxis.   Tdap Vaccine - COST NOT COVERED BY MEDICARE PART B Recommended at least once for all adults. For pregnant patients, recommended with each pregnancy.   Shingles Vaccine (Shingrix) - COST NOT COVERED BY MEDICARE PART B  2 shot series recommended in those 19 years and older who have or will have weakened immune systems or those 50 years and older     Health Maintenance Due:      Topic Date Due   • Breast Cancer Screening: Mammogram  06/11/2025   • Hepatitis C Screening  Completed   • Colorectal Cancer Screening  Discontinued     Immunizations Due:      Topic Date Due   • COVID-19 Vaccine (6 - 2024-25 season) 09/01/2024   • Influenza Vaccine (Season Ended) 09/01/2025     Advance Directives   What are advance directives?  Advance directives are legal documents that state  your wishes and plans for medical care. These plans are made ahead of time in case you lose your ability to make decisions for yourself. Advance directives can apply to any medical decision, such as the treatments you want, and if you want to donate organs.   What are the types of advance directives?  There are many types of advance directives, and each state has rules about how to use them. You may choose a combination of any of the following:  Living will:  This is a written record of the treatment you want. You can also choose which treatments you do not want, which to limit, and which to stop at a certain time. This includes surgery, medicine, IV fluid, and tube feedings.   Durable power of  for healthcare (DPAHC):  This is a written record that states who you want to make healthcare choices for you when you are unable to make them for yourself. This person, called a proxy, is usually a family member or a friend. You may choose more than 1 proxy.  Do not resuscitate (DNR) order:  A DNR order is used in case your heart stops beating or you stop breathing. It is a request not to have certain forms of treatment, such as CPR. A DNR order may be included in other types of advance directives.  Medical directive:  This covers the care that you want if you are in a coma, near death, or unable to make decisions for yourself. You can list the treatments you want for each condition. Treatment may include pain medicine, surgery, blood transfusions, dialysis, IV or tube feedings, and a ventilator (breathing machine).  Values history:  This document has questions about your views, beliefs, and how you feel and think about life. This information can help others choose the care that you would choose.  Why are advance directives important?  An advance directive helps you control your care. Although spoken wishes may be used, it is better to have your wishes written down. Spoken wishes can be misunderstood, or not  followed. Treatments may be given even if you do not want them. An advance directive may make it easier for your family to make difficult choices about your care.   Urinary Incontinence   Urinary incontinence (UI)  is when you lose control of your bladder. UI develops because your bladder cannot store or empty urine properly. The 3 most common types of UI are stress incontinence, urge incontinence, or both.  Medicines:   May be given to help strengthen your bladder control. Report any side effects of medication to your healthcare provider.  Do pelvic muscle exercises often:  Your pelvic muscles help you stop urinating. Squeeze these muscles tight for 5 seconds, then relax for 5 seconds. Gradually work up to squeezing for 10 seconds. Do 3 sets of 15 repetitions a day, or as directed. This will help strengthen your pelvic muscles and improve bladder control.  Train your bladder:  Go to the bathroom at set times, such as every 2 hours, even if you do not feel the urge to go. You can also try to hold your urine when you feel the urge to go. For example, hold your urine for 5 minutes when you feel the urge to go. As that becomes easier, hold your urine for 10 minutes.   Self-care:   Keep a UI record.  Write down how often you leak urine and how much you leak. Make a note of what you were doing when you leaked urine.  Drink liquids as directed. You may need to limit the amount of liquid you drink to help control your urine leakage. Do not drink any liquid right before you go to bed. Limit or do not have drinks that contain caffeine or alcohol.   Prevent constipation.  Eat a variety of high-fiber foods. Good examples are high-fiber cereals, beans, vegetables, and whole-grain breads. Walking is the best way to trigger your intestines to have a bowel movement.  Exercise regularly and maintain a healthy weight.  Weight loss and exercise will decrease pressure on your bladder and help you control your leakage.   Use a catheter  as directed  to help empty your bladder. A catheter is a tiny, plastic tube that is put into your bladder to drain your urine.   Go to behavior therapy as directed.  Behavior therapy may be used to help you learn to control your urge to urinate.    Weight Management   Why it is important to manage your weight:  Being overweight increases your risk of health conditions such as heart disease, high blood pressure, type 2 diabetes, and certain types of cancer. It can also increase your risk for osteoarthritis, sleep apnea, and other respiratory problems. Aim for a slow, steady weight loss. Even a small amount of weight loss can lower your risk of health problems.  How to lose weight safely:  A safe and healthy way to lose weight is to eat fewer calories and get regular exercise. You can lose up about 1 pound a week by decreasing the number of calories you eat by 500 calories each day.   Healthy meal plan for weight management:  A healthy meal plan includes a variety of foods, contains fewer calories, and helps you stay healthy. A healthy meal plan includes the following:  Eat whole-grain foods more often.  A healthy meal plan should contain fiber. Fiber is the part of grains, fruits, and vegetables that is not broken down by your body. Whole-grain foods are healthy and provide extra fiber in your diet. Some examples of whole-grain foods are whole-wheat breads and pastas, oatmeal, brown rice, and bulgur.  Eat a variety of vegetables every day.  Include dark, leafy greens such as spinach, kale, damien greens, and mustard greens. Eat yellow and orange vegetables such as carrots, sweet potatoes, and winter squash.   Eat a variety of fruits every day.  Choose fresh or canned fruit (canned in its own juice or light syrup) instead of juice. Fruit juice has very little or no fiber.  Eat low-fat dairy foods.  Drink fat-free (skim) milk or 1% milk. Eat fat-free yogurt and low-fat cottage cheese. Try low-fat cheeses such as  mozzarella and other reduced-fat cheeses.  Choose meat and other protein foods that are low in fat.  Choose beans or other legumes such as split peas or lentils. Choose fish, skinless poultry (chicken or turkey), or lean cuts of red meat (beef or pork). Before you cook meat or poultry, cut off any visible fat.   Use less fat and oil.  Try baking foods instead of frying them. Add less fat, such as margarine, sour cream, regular salad dressing and mayonnaise to foods. Eat fewer high-fat foods. Some examples of high-fat foods include french fries, doughnuts, ice cream, and cakes.  Eat fewer sweets.  Limit foods and drinks that are high in sugar. This includes candy, cookies, regular soda, and sweetened drinks.  Exercise:  Exercise at least 30 minutes per day on most days of the week. Some examples of exercise include walking, biking, dancing, and swimming. You can also fit in more physical activity by taking the stairs instead of the elevator or parking farther away from stores. Ask your healthcare provider about the best exercise plan for you.   Narcotic (Opioid) Safety    Use narcotics safely:  Take prescribed narcotics exactly as directed  Do not give narcotics to others or take narcotics that belong to someone else  Do not mix narcotics without medicines or alcohol  Do not drive or operate heavy machinery after you take the narcotic  Monitor for side effects and notify your healthcare provider if you experienced side effects such as nausea, sleepiness, itching, or trouble thinking clearly.    Manage constipation:    Constipation is the most common side effect of narcotic medicine. Constipation is when you have hard, dry bowel movements, or you go longer than usual between bowel movements. Tell your healthcare provider about all changes in your bowel movements while you are taking narcotics. He or she may recommend laxative medicine to help you have a bowel movement. He or she may also change the kind of narcotic  you are taking, or change when you take it. The following are more ways you can prevent or relieve constipation:    Drink liquids as directed.  You may need to drink extra liquids to help soften and move your bowels. Ask how much liquid to drink each day and which liquids are best for you.  Eat high-fiber foods.  This may help decrease constipation by adding bulk to your bowel movements. High-fiber foods include fruits, vegetables, whole-grain breads and cereals, and beans. Your healthcare provider or dietitian can help you create a high-fiber meal plan. Your provider may also recommend a fiber supplement if you cannot get enough fiber from food.  Exercise regularly.  Regular physical activity can help stimulate your intestines. Walking is a good exercise to prevent or relieve constipation. Ask which exercises are best for you.  Schedule a time each day to have a bowel movement.  This may help train your body to have regular bowel movements. Bend forward while you are on the toilet to help move the bowel movement out. Sit on the toilet for at least 10 minutes, even if you do not have a bowel movement.    Store narcotics safely:   Store narcotics where others cannot easily get them.  Keep them in a locked cabinet or secure area. Do not  keep them in a purse or other bag you carry with you. A person may be looking for something else and find the narcotics.  Make sure narcotics are stored out of the reach of children.  A child can easily overdose on narcotics. Narcotics may look like candy to a small child.    The best way to dispose of narcotics:      The laws vary by country and area. In the [place], the best way is to return the narcotics through a take-back program. This program is offered by the [place] Drug Enforcement Agency ([employer]). The following are options for using the program:  Take the narcotics to a [employer] collection site.  The site is often a law enforcement center. Call your local law enforcement center for  scheduled take-back days in your area. You will be given information on where to go if the collection site is in a different location.  Take the narcotics to an approved pharmacy or hospital.  A pharmacy or hospital may be set up as a collection site. You will need to ask if it is a [employer] collection site if you were not directed there. A pharmacy or doctor's office may not be able to take back narcotics unless it is a [employer] site.  Use a mail-back system.  This means you are given containers to put the narcotics into. You will then mail them in the containers.  Use a take-back drop box.  This is a place to leave the narcotics at any time. People and animals will not be able to get into the box. Your local law enforcement agency can tell you where to find a drop box in your area.    Other ways to manage pain:   Ask your healthcare provider about non-narcotic medicines to control pain.  Nonprescription medicines include NSAIDs (such as ibuprofen) and acetaminophen. Prescription medicines include muscle relaxers, antidepressants, and steroids.  Pain may be managed without any medicines.  Some ways to relieve pain include massage, aromatherapy, or meditation. Physical or occupational therapy may also help.    For more information:   Drug Enforcement Administration  [address] [place] [address]  Phone: 1- 656 - 400-8399  Web Address: https://www.deadiversion[employer].gov/drug_disposal/    [place] Food and Drug Administration  [address] [place] [address]  Phone: 5- 740 - 965-5744  Web Address: http://www.fda.gov   © Copyright [employer] 2018 Information is for End User's use only and may not be sold, redistributed or otherwise used for commercial purposes. All illustrations and images included in CareNotes® are the copyrighted property of [employer] or [employer]